# Patient Record
Sex: MALE | Race: WHITE | NOT HISPANIC OR LATINO
[De-identification: names, ages, dates, MRNs, and addresses within clinical notes are randomized per-mention and may not be internally consistent; named-entity substitution may affect disease eponyms.]

---

## 2017-02-15 ENCOUNTER — RX RENEWAL (OUTPATIENT)
Age: 77
End: 2017-02-15

## 2017-03-08 ENCOUNTER — RESULT REVIEW (OUTPATIENT)
Age: 77
End: 2017-03-08

## 2017-03-09 ENCOUNTER — TRANSCRIPTION ENCOUNTER (OUTPATIENT)
Age: 77
End: 2017-03-09

## 2017-03-09 ENCOUNTER — OUTPATIENT (OUTPATIENT)
Dept: OUTPATIENT SERVICES | Facility: HOSPITAL | Age: 77
LOS: 1 days | Discharge: HOME | End: 2017-03-09
Payer: MEDICARE

## 2017-03-09 DIAGNOSIS — E11.9 TYPE 2 DIABETES MELLITUS WITHOUT COMPLICATIONS: ICD-10-CM

## 2017-03-09 DIAGNOSIS — I10 ESSENTIAL (PRIMARY) HYPERTENSION: ICD-10-CM

## 2017-03-09 DIAGNOSIS — K92.1 MELENA: ICD-10-CM

## 2017-03-09 DIAGNOSIS — B96.81 HELICOBACTER PYLORI [H. PYLORI] AS THE CAUSE OF DISEASES CLASSIFIED ELSEWHERE: ICD-10-CM

## 2017-03-09 DIAGNOSIS — K29.50 UNSPECIFIED CHRONIC GASTRITIS WITHOUT BLEEDING: ICD-10-CM

## 2017-03-09 DIAGNOSIS — R19.5 OTHER FECAL ABNORMALITIES: ICD-10-CM

## 2017-03-09 DIAGNOSIS — Z88.0 ALLERGY STATUS TO PENICILLIN: ICD-10-CM

## 2017-03-09 DIAGNOSIS — I25.2 OLD MYOCARDIAL INFARCTION: ICD-10-CM

## 2017-03-09 PROCEDURE — 88305 TISSUE EXAM BY PATHOLOGIST: CPT | Mod: 26

## 2017-03-09 PROCEDURE — 88312 SPECIAL STAINS GROUP 1: CPT | Mod: 26

## 2017-03-10 ENCOUNTER — APPOINTMENT (OUTPATIENT)
Dept: PHYSICAL MEDICINE AND REHAB | Facility: CLINIC | Age: 77
End: 2017-03-10

## 2017-03-10 PROCEDURE — 88305 TISSUE EXAM BY PATHOLOGIST: CPT

## 2017-03-10 PROCEDURE — 43239 EGD BIOPSY SINGLE/MULTIPLE: CPT

## 2017-03-10 PROCEDURE — 88312 SPECIAL STAINS GROUP 1: CPT

## 2017-06-01 ENCOUNTER — APPOINTMENT (OUTPATIENT)
Dept: INTERNAL MEDICINE | Facility: CLINIC | Age: 77
End: 2017-06-01

## 2017-06-01 VITALS — SYSTOLIC BLOOD PRESSURE: 120 MMHG | RESPIRATION RATE: 16 BRPM | DIASTOLIC BLOOD PRESSURE: 70 MMHG | HEART RATE: 76 BPM

## 2017-06-01 DIAGNOSIS — E88.81 METABOLIC SYNDROME: ICD-10-CM

## 2017-06-01 DIAGNOSIS — J30.2 OTHER SEASONAL ALLERGIC RHINITIS: ICD-10-CM

## 2017-06-01 RX ORDER — LEVOTHYROXINE SODIUM 0.03 MG/1
25 TABLET ORAL DAILY
Qty: 90 | Refills: 3 | Status: DISCONTINUED | COMMUNITY
Start: 2017-05-16 | End: 2017-06-01

## 2017-06-05 ENCOUNTER — EMERGENCY (EMERGENCY)
Facility: HOSPITAL | Age: 77
LOS: 1 days | Discharge: HOME | End: 2017-06-05
Admitting: EMERGENCY MEDICINE
Payer: MEDICARE

## 2017-06-05 DIAGNOSIS — M62.838 OTHER MUSCLE SPASM: ICD-10-CM

## 2017-06-05 DIAGNOSIS — Z88.1 ALLERGY STATUS TO OTHER ANTIBIOTIC AGENTS STATUS: ICD-10-CM

## 2017-06-05 DIAGNOSIS — E11.9 TYPE 2 DIABETES MELLITUS WITHOUT COMPLICATIONS: ICD-10-CM

## 2017-06-05 DIAGNOSIS — Z79.899 OTHER LONG TERM (CURRENT) DRUG THERAPY: ICD-10-CM

## 2017-06-05 DIAGNOSIS — Z88.0 ALLERGY STATUS TO PENICILLIN: ICD-10-CM

## 2017-06-05 DIAGNOSIS — Z88.2 ALLERGY STATUS TO SULFONAMIDES: ICD-10-CM

## 2017-06-05 PROCEDURE — 87086 URINE CULTURE/COLONY COUNT: CPT

## 2017-06-05 PROCEDURE — 36415 COLL VENOUS BLD VENIPUNCTURE: CPT

## 2017-06-05 PROCEDURE — 80048 BASIC METABOLIC PNL TOTAL CA: CPT

## 2017-06-05 PROCEDURE — 99284 EMERGENCY DEPT VISIT MOD MDM: CPT

## 2017-06-05 PROCEDURE — 99283 EMERGENCY DEPT VISIT LOW MDM: CPT | Mod: 25

## 2017-06-05 PROCEDURE — 85027 COMPLETE CBC AUTOMATED: CPT

## 2017-06-05 PROCEDURE — 81003 URINALYSIS AUTO W/O SCOPE: CPT

## 2017-06-05 PROCEDURE — 51702 INSERT TEMP BLADDER CATH: CPT

## 2017-06-05 PROCEDURE — 73522 X-RAY EXAM HIPS BI 3-4 VIEWS: CPT | Mod: 26

## 2017-06-05 PROCEDURE — 73522 X-RAY EXAM HIPS BI 3-4 VIEWS: CPT

## 2017-06-08 LAB
ALBUMIN SERPL ELPH-MCNC: 3.7 G/DL
ALP BLD-CCNC: 87 U/L
ALT SERPL-CCNC: 30 U/L
ANION GAP SERPL CALC-SCNC: 14 MMOL/L
AST SERPL-CCNC: 32 U/L
BASOPHILS # BLD AUTO: 0.02 K/UL
BASOPHILS NFR BLD AUTO: 0.2 %
BILIRUB SERPL-MCNC: 0.3 MG/DL
BUN SERPL-MCNC: 16 MG/DL
CALCIUM SERPL-MCNC: 9.8 MG/DL
CHLORIDE SERPL-SCNC: 103 MMOL/L
CHOLEST SERPL-MCNC: 138 MG/DL
CHOLEST/HDLC SERPL: 2 RATIO
CO2 SERPL-SCNC: 23 MMOL/L
CREAT SERPL-MCNC: 0.67 MG/DL
EOSINOPHIL # BLD AUTO: 0.38 K/UL
EOSINOPHIL NFR BLD AUTO: 3.5 %
FERRITIN SERPL-MCNC: 204 NG/ML
FOLATE SERPL-MCNC: 17 NG/ML
GLUCOSE SERPL-MCNC: 113 MG/DL
HBA1C MFR BLD HPLC: 6 %
HCT VFR BLD CALC: 37.4 %
HDLC SERPL-MCNC: 69 MG/DL
HGB BLD-MCNC: 12.1 G/DL
IMM GRANULOCYTES NFR BLD AUTO: 0.3 %
IRON SATN MFR SERPL: 29 %
IRON SERPL-MCNC: 61 UG/DL
LDLC SERPL CALC-MCNC: 59 MG/DL
LYMPHOCYTES # BLD AUTO: 0.53 K/UL
LYMPHOCYTES NFR BLD AUTO: 4.9 %
MAN DIFF?: NORMAL
MCHC RBC-ENTMCNC: 29.6 PG
MCHC RBC-ENTMCNC: 32.4 GM/DL
MCV RBC AUTO: 91.4 FL
MONOCYTES # BLD AUTO: 0.85 K/UL
MONOCYTES NFR BLD AUTO: 7.9 %
NEUTROPHILS # BLD AUTO: 8.97 K/UL
NEUTROPHILS NFR BLD AUTO: 83.2 %
PLATELET # BLD AUTO: 321 K/UL
POTASSIUM SERPL-SCNC: 4.7 MMOL/L
PROT SERPL-MCNC: 6.9 G/DL
RBC # BLD: 4.09 M/UL
RBC # FLD: 14.2 %
SODIUM SERPL-SCNC: 140 MMOL/L
T4 FREE SERPL-MCNC: 1.2 NG/DL
T4 SERPL-MCNC: 5.9 UG/DL
TIBC SERPL-MCNC: 213 UG/DL
TRIGL SERPL-MCNC: 48 MG/DL
TSH SERPL-ACNC: 3.24 UIU/ML
UIBC SERPL-MCNC: 152 UG/DL
VIT B12 SERPL-MCNC: 1698 PG/ML
WBC # FLD AUTO: 10.78 K/UL

## 2017-06-09 ENCOUNTER — MEDICATION RENEWAL (OUTPATIENT)
Age: 77
End: 2017-06-09

## 2017-06-14 ENCOUNTER — APPOINTMENT (OUTPATIENT)
Dept: PHYSICAL MEDICINE AND REHAB | Facility: CLINIC | Age: 77
End: 2017-06-14

## 2017-06-14 VITALS
DIASTOLIC BLOOD PRESSURE: 61 MMHG | HEIGHT: 72 IN | HEART RATE: 64 BPM | BODY MASS INDEX: 18.42 KG/M2 | OXYGEN SATURATION: 100 % | SYSTOLIC BLOOD PRESSURE: 99 MMHG | TEMPERATURE: 98.5 F | WEIGHT: 136 LBS

## 2017-08-18 ENCOUNTER — APPOINTMENT (OUTPATIENT)
Dept: INTERNAL MEDICINE | Facility: CLINIC | Age: 77
End: 2017-08-18
Payer: MEDICARE

## 2017-08-18 VITALS
HEIGHT: 72 IN | HEART RATE: 64 BPM | DIASTOLIC BLOOD PRESSURE: 62 MMHG | OXYGEN SATURATION: 98 % | RESPIRATION RATE: 16 BRPM | WEIGHT: 136 LBS | TEMPERATURE: 98.2 F | SYSTOLIC BLOOD PRESSURE: 118 MMHG | BODY MASS INDEX: 18.42 KG/M2

## 2017-08-18 DIAGNOSIS — Z83.3 FAMILY HISTORY OF DIABETES MELLITUS: ICD-10-CM

## 2017-08-18 DIAGNOSIS — Z83.1 FAMILY HISTORY OF OTHER INFECTIOUS AND PARASITIC DISEASES: ICD-10-CM

## 2017-08-18 DIAGNOSIS — Z78.9 OTHER SPECIFIED HEALTH STATUS: ICD-10-CM

## 2017-08-18 PROCEDURE — 99214 OFFICE O/P EST MOD 30 MIN: CPT

## 2017-08-18 RX ORDER — CLOTRIMAZOLE AND BETAMETHASONE DIPROPIONATE 10; .5 MG/G; MG/G
1-0.05 CREAM TOPICAL
Qty: 45 | Refills: 0 | Status: DISCONTINUED | COMMUNITY
Start: 2017-02-07 | End: 2017-08-18

## 2017-08-18 RX ORDER — LEVOTHYROXINE SODIUM 0.05 MG/1
50 TABLET ORAL
Qty: 90 | Refills: 0 | Status: DISCONTINUED | COMMUNITY
Start: 2017-04-12

## 2017-08-18 RX ORDER — LEVOFLOXACIN 250 MG/1
250 TABLET, FILM COATED ORAL
Qty: 7 | Refills: 0 | Status: DISCONTINUED | COMMUNITY
Start: 2017-08-10

## 2017-08-18 RX ORDER — FLUTICASONE PROPIONATE 50 UG/1
50 SPRAY, METERED NASAL TWICE DAILY
Qty: 3 | Refills: 3 | Status: DISCONTINUED | COMMUNITY
Start: 2017-06-09 | End: 2017-08-18

## 2017-08-18 RX ORDER — MUPIROCIN 20 MG/G
2 OINTMENT TOPICAL
Qty: 22 | Refills: 0 | Status: DISCONTINUED | COMMUNITY
Start: 2017-02-23

## 2017-08-18 RX ORDER — MUPIROCIN 2 G/100G
2 CREAM TOPICAL
Qty: 30 | Refills: 0 | Status: DISCONTINUED | COMMUNITY
Start: 2017-03-31 | End: 2017-08-18

## 2017-10-12 ENCOUNTER — APPOINTMENT (OUTPATIENT)
Dept: INTERNAL MEDICINE | Facility: CLINIC | Age: 77
End: 2017-10-12
Payer: MEDICARE

## 2017-10-12 DIAGNOSIS — Z23 ENCOUNTER FOR IMMUNIZATION: ICD-10-CM

## 2017-10-12 PROCEDURE — 90686 IIV4 VACC NO PRSV 0.5 ML IM: CPT

## 2017-10-12 PROCEDURE — G0009: CPT

## 2017-10-12 PROCEDURE — 90670 PCV13 VACCINE IM: CPT

## 2017-10-12 PROCEDURE — G0008: CPT

## 2017-10-26 LAB
25(OH)D3 SERPL-MCNC: 47.5 NG/ML
ALBUMIN SERPL ELPH-MCNC: 3.9 G/DL
ALP BLD-CCNC: 77 U/L
ALT SERPL-CCNC: 16 U/L
ANION GAP SERPL CALC-SCNC: 12 MMOL/L
AST SERPL-CCNC: 35 U/L
BASOPHILS # BLD AUTO: 0.01 K/UL
BASOPHILS NFR BLD AUTO: 0.2 %
BILIRUB SERPL-MCNC: 0.4 MG/DL
BUN SERPL-MCNC: 18 MG/DL
CALCIUM SERPL-MCNC: 9.3 MG/DL
CHLORIDE SERPL-SCNC: 100 MMOL/L
CHOLEST SERPL-MCNC: 157 MG/DL
CHOLEST/HDLC SERPL: 2 RATIO
CO2 SERPL-SCNC: 27 MMOL/L
CREAT SERPL-MCNC: 0.55 MG/DL
EOSINOPHIL # BLD AUTO: 0.26 K/UL
EOSINOPHIL NFR BLD AUTO: 3.9 %
GLUCOSE SERPL-MCNC: 109 MG/DL
HBA1C MFR BLD HPLC: 5.9 %
HCT VFR BLD CALC: 38.1 %
HDLC SERPL-MCNC: 78 MG/DL
HGB BLD-MCNC: 12.6 G/DL
IMM GRANULOCYTES NFR BLD AUTO: 0.3 %
LDLC SERPL CALC-MCNC: 70 MG/DL
LYMPHOCYTES # BLD AUTO: 0.64 K/UL
LYMPHOCYTES NFR BLD AUTO: 9.6 %
MAN DIFF?: NORMAL
MCHC RBC-ENTMCNC: 30.7 PG
MCHC RBC-ENTMCNC: 33.1 GM/DL
MCV RBC AUTO: 92.9 FL
MONOCYTES # BLD AUTO: 0.59 K/UL
MONOCYTES NFR BLD AUTO: 8.9 %
NEUTROPHILS # BLD AUTO: 5.13 K/UL
NEUTROPHILS NFR BLD AUTO: 77.1 %
PLATELET # BLD AUTO: 239 K/UL
POTASSIUM SERPL-SCNC: 5 MMOL/L
PROT SERPL-MCNC: 7.3 G/DL
RBC # BLD: 4.1 M/UL
RBC # FLD: 14.5 %
SODIUM SERPL-SCNC: 139 MMOL/L
TRIGL SERPL-MCNC: 44 MG/DL
TSH SERPL-ACNC: 2.77 UIU/ML
WBC # FLD AUTO: 6.65 K/UL

## 2017-11-08 LAB
PSA FREE FLD-MCNC: 26
PSA FREE SERPL-MCNC: 0.47 NG/ML
PSA SERPL-MCNC: 1.81 NG/ML

## 2017-11-27 ENCOUNTER — OTHER (OUTPATIENT)
Age: 77
End: 2017-11-27

## 2017-11-27 ENCOUNTER — MEDICATION RENEWAL (OUTPATIENT)
Age: 77
End: 2017-11-27

## 2017-11-27 DIAGNOSIS — E55.9 VITAMIN D DEFICIENCY, UNSPECIFIED: ICD-10-CM

## 2017-11-28 LAB — 25(OH)D3 SERPL-MCNC: 56.1 NG/ML

## 2018-01-26 ENCOUNTER — EMERGENCY (EMERGENCY)
Facility: HOSPITAL | Age: 78
LOS: 1 days | Discharge: HOME | End: 2018-01-26
Admitting: EMERGENCY MEDICINE
Payer: MEDICARE

## 2018-01-26 DIAGNOSIS — Z88.0 ALLERGY STATUS TO PENICILLIN: ICD-10-CM

## 2018-01-26 DIAGNOSIS — R05 COUGH: ICD-10-CM

## 2018-01-26 DIAGNOSIS — Z86.69 PERSONAL HISTORY OF OTHER DISEASES OF THE NERVOUS SYSTEM AND SENSE ORGANS: ICD-10-CM

## 2018-01-26 DIAGNOSIS — L89.114 PRESSURE ULCER OF RIGHT UPPER BACK, STAGE 4: ICD-10-CM

## 2018-01-26 DIAGNOSIS — Z88.2 ALLERGY STATUS TO SULFONAMIDES: ICD-10-CM

## 2018-01-26 DIAGNOSIS — Z88.1 ALLERGY STATUS TO OTHER ANTIBIOTIC AGENTS STATUS: ICD-10-CM

## 2018-01-26 DIAGNOSIS — L08.9 LOCAL INFECTION OF THE SKIN AND SUBCUTANEOUS TISSUE, UNSPECIFIED: ICD-10-CM

## 2018-01-26 DIAGNOSIS — Z79.899 OTHER LONG TERM (CURRENT) DRUG THERAPY: ICD-10-CM

## 2018-01-26 DIAGNOSIS — E11.9 TYPE 2 DIABETES MELLITUS WITHOUT COMPLICATIONS: ICD-10-CM

## 2018-01-26 DIAGNOSIS — Z86.19 PERSONAL HISTORY OF OTHER INFECTIOUS AND PARASITIC DISEASES: ICD-10-CM

## 2018-01-26 DIAGNOSIS — I10 ESSENTIAL (PRIMARY) HYPERTENSION: ICD-10-CM

## 2018-01-26 PROCEDURE — 71045 X-RAY EXAM CHEST 1 VIEW: CPT

## 2018-01-26 PROCEDURE — 83605 ASSAY OF LACTIC ACID: CPT

## 2018-01-26 PROCEDURE — 99284 EMERGENCY DEPT VISIT MOD MDM: CPT

## 2018-01-26 PROCEDURE — 96365 THER/PROPH/DIAG IV INF INIT: CPT

## 2018-01-26 PROCEDURE — 85610 PROTHROMBIN TIME: CPT

## 2018-01-26 PROCEDURE — 93005 ELECTROCARDIOGRAM TRACING: CPT

## 2018-01-26 PROCEDURE — 87633 RESP VIRUS 12-25 TARGETS: CPT

## 2018-01-26 PROCEDURE — 85027 COMPLETE CBC AUTOMATED: CPT

## 2018-01-26 PROCEDURE — 96368 THER/DIAG CONCURRENT INF: CPT

## 2018-01-26 PROCEDURE — 87070 CULTURE OTHR SPECIMN AEROBIC: CPT

## 2018-01-26 PROCEDURE — 87400 INFLUENZA A/B EACH AG IA: CPT

## 2018-01-26 PROCEDURE — 71045 X-RAY EXAM CHEST 1 VIEW: CPT | Mod: 26

## 2018-01-26 PROCEDURE — 87040 BLOOD CULTURE FOR BACTERIA: CPT

## 2018-01-26 PROCEDURE — 87581 M.PNEUMON DNA AMP PROBE: CPT

## 2018-01-26 PROCEDURE — 80048 BASIC METABOLIC PNL TOTAL CA: CPT

## 2018-01-26 PROCEDURE — 87486 CHLMYD PNEUM DNA AMP PROBE: CPT

## 2018-01-26 PROCEDURE — 99285 EMERGENCY DEPT VISIT HI MDM: CPT | Mod: 25

## 2018-01-26 PROCEDURE — 85730 THROMBOPLASTIN TIME PARTIAL: CPT

## 2018-01-26 PROCEDURE — 93010 ELECTROCARDIOGRAM REPORT: CPT

## 2018-01-29 ENCOUNTER — APPOINTMENT (OUTPATIENT)
Dept: INTERNAL MEDICINE | Facility: CLINIC | Age: 78
End: 2018-01-29
Payer: MEDICARE

## 2018-01-29 VITALS
HEIGHT: 72 IN | TEMPERATURE: 97.9 F | OXYGEN SATURATION: 100 % | SYSTOLIC BLOOD PRESSURE: 96 MMHG | HEART RATE: 74 BPM | DIASTOLIC BLOOD PRESSURE: 50 MMHG | RESPIRATION RATE: 18 BRPM

## 2018-01-29 DIAGNOSIS — M46.24 OSTEOMYELITIS OF VERTEBRA, THORACIC REGION: ICD-10-CM

## 2018-01-29 PROCEDURE — 99214 OFFICE O/P EST MOD 30 MIN: CPT

## 2018-01-29 RX ORDER — MUPIROCIN CALCIUM 20 MG/G
2 CREAM TOPICAL TWICE DAILY
Refills: 0 | Status: DISCONTINUED | COMMUNITY
Start: 2017-08-18 | End: 2018-01-29

## 2018-01-29 RX ORDER — HYDROCORTISONE VALERATE 2 MG/G
0.2 CREAM TOPICAL
Qty: 60 | Refills: 0 | Status: DISCONTINUED | COMMUNITY
Start: 2017-10-26

## 2018-02-01 ENCOUNTER — APPOINTMENT (OUTPATIENT)
Dept: RADIOLOGY | Facility: CLINIC | Age: 78
End: 2018-02-01

## 2018-02-07 ENCOUNTER — APPOINTMENT (OUTPATIENT)
Dept: INTERNAL MEDICINE | Facility: CLINIC | Age: 78
End: 2018-02-07
Payer: MEDICARE

## 2018-02-07 VITALS
BODY MASS INDEX: 18.42 KG/M2 | DIASTOLIC BLOOD PRESSURE: 66 MMHG | HEIGHT: 72 IN | WEIGHT: 136 LBS | SYSTOLIC BLOOD PRESSURE: 98 MMHG

## 2018-02-07 PROCEDURE — 99214 OFFICE O/P EST MOD 30 MIN: CPT

## 2018-02-22 ENCOUNTER — MEDICATION RENEWAL (OUTPATIENT)
Age: 78
End: 2018-02-22

## 2018-02-22 DIAGNOSIS — K59.00 CONSTIPATION, UNSPECIFIED: ICD-10-CM

## 2018-03-27 ENCOUNTER — MEDICATION RENEWAL (OUTPATIENT)
Age: 78
End: 2018-03-27

## 2018-03-27 DIAGNOSIS — R19.7 DIARRHEA, UNSPECIFIED: ICD-10-CM

## 2018-04-02 PROBLEM — R19.7 DIARRHEA: Status: ACTIVE | Noted: 2018-04-02

## 2018-04-24 DIAGNOSIS — I25.10 ATHEROSCLEROTIC HEART DISEASE OF NATIVE CORONARY ARTERY W/OUT ANGINA PECTORIS: ICD-10-CM

## 2018-04-24 DIAGNOSIS — Z01.818 ENCOUNTER FOR OTHER PREPROCEDURAL EXAMINATION: ICD-10-CM

## 2018-05-02 VITALS
SYSTOLIC BLOOD PRESSURE: 100 MMHG | HEIGHT: 72 IN | TEMPERATURE: 98 F | HEART RATE: 76 BPM | RESPIRATION RATE: 16 BRPM | DIASTOLIC BLOOD PRESSURE: 60 MMHG

## 2018-05-02 PROBLEM — Z01.818 PREOPERATIVE EXAMINATION: Status: ACTIVE | Noted: 2018-02-07

## 2018-06-11 ENCOUNTER — APPOINTMENT (OUTPATIENT)
Dept: PHYSICAL MEDICINE AND REHAB | Facility: CLINIC | Age: 78
End: 2018-06-11
Payer: MEDICARE

## 2018-06-11 PROCEDURE — G0372 MD SERVICE REQUIRED FOR PMD: CPT

## 2018-06-11 PROCEDURE — 99213 OFFICE O/P EST LOW 20 MIN: CPT

## 2018-06-12 VITALS
TEMPERATURE: 97.4 F | OXYGEN SATURATION: 99 % | HEART RATE: 71 BPM | DIASTOLIC BLOOD PRESSURE: 69 MMHG | SYSTOLIC BLOOD PRESSURE: 106 MMHG

## 2018-06-12 RX ORDER — LACTULOSE 10 G/15ML
10 SOLUTION ORAL
Qty: 2 | Refills: 5 | Status: ACTIVE | COMMUNITY
Start: 2018-02-22

## 2018-06-12 RX ORDER — CHOLESTYRAMINE 4 G/9G
4 POWDER, FOR SUSPENSION ORAL DAILY
Qty: 30 | Refills: 5 | Status: DISCONTINUED | COMMUNITY
Start: 2018-04-02 | End: 2018-06-12

## 2018-06-25 ENCOUNTER — RX RENEWAL (OUTPATIENT)
Age: 78
End: 2018-06-25

## 2018-07-23 PROBLEM — E88.81 INSULIN RESISTANCE: Status: ACTIVE | Noted: 2017-06-01

## 2018-08-02 ENCOUNTER — APPOINTMENT (OUTPATIENT)
Dept: OTOLARYNGOLOGY | Facility: CLINIC | Age: 78
End: 2018-08-02

## 2018-10-04 ENCOUNTER — CHART COPY (OUTPATIENT)
Age: 78
End: 2018-10-04

## 2018-10-09 ENCOUNTER — APPOINTMENT (OUTPATIENT)
Dept: INTERNAL MEDICINE | Facility: CLINIC | Age: 78
End: 2018-10-09
Payer: MEDICARE

## 2018-10-09 VITALS
HEART RATE: 65 BPM | HEIGHT: 72 IN | OXYGEN SATURATION: 98 % | DIASTOLIC BLOOD PRESSURE: 60 MMHG | SYSTOLIC BLOOD PRESSURE: 90 MMHG

## 2018-10-09 DIAGNOSIS — Z23 ENCOUNTER FOR IMMUNIZATION: ICD-10-CM

## 2018-10-09 PROCEDURE — 99214 OFFICE O/P EST MOD 30 MIN: CPT | Mod: 25

## 2018-10-09 PROCEDURE — 90662 IIV NO PRSV INCREASED AG IM: CPT

## 2018-10-09 PROCEDURE — G0008: CPT

## 2018-10-09 NOTE — HISTORY OF PRESENT ILLNESS
[FreeTextEntry1] : Follow up wounds [de-identified] : Here for follow up \par -wounds healing slowly\par -appetite OK\par -taking supplements including 100 mg of protein, whey protein 40 grams, eggs ham

## 2018-10-18 LAB
25(OH)D3 SERPL-MCNC: 117 NG/ML
ALBUMIN SERPL ELPH-MCNC: 3.7 G/DL
ALP BLD-CCNC: 100 U/L
ALT SERPL-CCNC: 22 U/L
ANION GAP SERPL CALC-SCNC: 10 MMOL/L
AST SERPL-CCNC: 19 U/L
BASOPHILS # BLD AUTO: 0.01 K/UL
BASOPHILS NFR BLD AUTO: 0.1 %
BILIRUB SERPL-MCNC: 0.3 MG/DL
BUN SERPL-MCNC: 23 MG/DL
CALCIUM SERPL-MCNC: 9.1 MG/DL
CHLORIDE SERPL-SCNC: 104 MMOL/L
CHOLEST SERPL-MCNC: 126 MG/DL
CHOLEST/HDLC SERPL: 2.1 RATIO
CO2 SERPL-SCNC: 26 MMOL/L
CREAT SERPL-MCNC: 0.47 MG/DL
EOSINOPHIL # BLD AUTO: 0.28 K/UL
EOSINOPHIL NFR BLD AUTO: 4.1 %
GLUCOSE SERPL-MCNC: 130 MG/DL
HBA1C MFR BLD HPLC: 6.2 %
HCT VFR BLD CALC: 34 %
HDLC SERPL-MCNC: 60 MG/DL
HGB BLD-MCNC: 11.3 G/DL
IMM GRANULOCYTES NFR BLD AUTO: 0.1 %
LDLC SERPL CALC-MCNC: 58 MG/DL
LYMPHOCYTES # BLD AUTO: 0.79 K/UL
LYMPHOCYTES NFR BLD AUTO: 11.4 %
MAN DIFF?: NORMAL
MCHC RBC-ENTMCNC: 29.2 PG
MCHC RBC-ENTMCNC: 33.2 GM/DL
MCV RBC AUTO: 87.9 FL
MONOCYTES # BLD AUTO: 0.61 K/UL
MONOCYTES NFR BLD AUTO: 8.8 %
NEUTROPHILS # BLD AUTO: 5.2 K/UL
NEUTROPHILS NFR BLD AUTO: 75.5 %
PLATELET # BLD AUTO: 275 K/UL
POTASSIUM SERPL-SCNC: 5.4 MMOL/L
PROT SERPL-MCNC: 6.9 G/DL
RBC # BLD: 3.87 M/UL
RBC # FLD: 15.5 %
SODIUM SERPL-SCNC: 141 MMOL/L
TRIGL SERPL-MCNC: 38 MG/DL
TSH SERPL-ACNC: 3.54 UIU/ML
WBC # FLD AUTO: 6.9 K/UL

## 2018-11-01 ENCOUNTER — EMERGENCY (EMERGENCY)
Facility: HOSPITAL | Age: 78
LOS: 1 days | Discharge: HOME | End: 2018-11-01
Attending: EMERGENCY MEDICINE | Admitting: EMERGENCY MEDICINE
Payer: MEDICARE

## 2018-11-01 VITALS
HEIGHT: 72 IN | WEIGHT: 139.99 LBS | DIASTOLIC BLOOD PRESSURE: 82 MMHG | SYSTOLIC BLOOD PRESSURE: 136 MMHG | HEART RATE: 61 BPM | RESPIRATION RATE: 18 BRPM | TEMPERATURE: 98 F | OXYGEN SATURATION: 98 %

## 2018-11-01 DIAGNOSIS — R89.9 UNSPECIFIED ABNORMAL FINDING IN SPECIMENS FROM OTHER ORGANS, SYSTEMS AND TISSUES: ICD-10-CM

## 2018-11-01 PROCEDURE — 99283 EMERGENCY DEPT VISIT LOW MDM: CPT

## 2018-11-01 NOTE — ED PROVIDER NOTE - MEDICAL DECISION MAKING DETAILS
77 y/o M with h/o DM, paraplegia with chronic left foot ulceration sent in for "antibiotic script"- touch base with wound center to clarify reason for ED visit. Physical exam does not warrant IV ABX- consider PO ABX pending conversation. Will touch base with PMD Reyes as well.

## 2018-11-01 NOTE — ED PROVIDER NOTE - CARE PLAN
Assessment and plan of treatment:	Follow up with your PMD within 48-72 hrs.  Take all of your medications as previously prescribed. We will call you today. Worsening, continued or ANY new concerning symptoms return to the emergency department. Principal Discharge DX:	Skin ulcer of left foot, limited to breakdown of skin  Assessment and plan of treatment:	Follow up with your PMD within 48-72 hrs for a wound check.  Take all of your medications as previously prescribed. We will call you today for further medical management recommendations once we speak to the wound center. Worsening, continued or ANY new concerning symptoms return to the emergency department.

## 2018-11-01 NOTE — ED PROVIDER NOTE - ATTENDING CONTRIBUTION TO CARE
I personally evaluated the patient. I reviewed the Resident’s or Physician Assistant’s note (as assigned above), and agree with the findings and plan except as documented in my note.  Patient undergoing wound care at [facility]. WBC reported to be 13 and nurse sent patient for rx for antibiotics. Office called multiple times for more info . Did not call back and patient insisted on leaving.  PE: 2.5 cm circular ulcer with packing in place  5th metatarsal . No pus /streaking/swelling  Office called after patient left- ? admit based on white count  PO antibiotics given . Discussed with dr reyes who will follow up

## 2018-11-01 NOTE — ED PROVIDER NOTE - PLAN OF CARE
Follow up with your PMD within 48-72 hrs.  Take all of your medications as previously prescribed. We will call you today. Worsening, continued or ANY new concerning symptoms return to the emergency department. Follow up with your PMD within 48-72 hrs for a wound check.  Take all of your medications as previously prescribed. We will call you today for further medical management recommendations once we speak to the wound center. Worsening, continued or ANY new concerning symptoms return to the emergency department.

## 2018-11-01 NOTE — ED ADULT NURSE NOTE - NSIMPLEMENTINTERV_GEN_ALL_ED
Implemented All Fall with Harm Risk Interventions:  [place] to call system. Call bell, personal items and telephone within reach. Instruct patient to call for assistance. Room bathroom lighting operational. Non-slip footwear when patient is off stretcher. Physically safe environment: no spills, clutter or unnecessary equipment. Stretcher in lowest position, wheels locked, appropriate side rails in place. Provide visual cue, wrist band, yellow gown, etc. Monitor gait and stability. Monitor for mental status changes and reorient to person, place, and time. Review medications for side effects contributing to fall risk. Reinforce activity limits and safety measures with patient and family. Provide visual clues: red socks.

## 2018-11-01 NOTE — ED PROVIDER NOTE - OBJECTIVE STATEMENT
77 y/o M followed by [facility] wound center Dr. Clifton received a call from nurse today stating as per pt "go to the ER for a rx for oral antibiotic because your WBC count in 13". States Dr. Clifton is not in the office. Refusing to answer questions.   PMD: Reyes 77 y/o M with h/o DM, paraplegia followed by [facility] wound center Dr. Clifton received a call from nurse today stating as per pt "go to the ER for a rx for oral antibiotic because your WBC count in 13". States Dr. Clifton is not in the office. Refusing to answer questions or undress the wound . States he had an xray foot done yesterday- was not told the results. "just give me the abx so I can get out.   PMD: Reyes 77 y/o M with h/o DM, paraplegia followed by [facility] wound center Dr. Clifton received a call from nurse today stating as per pt "go to the ER for a rx for oral antibiotic because your WBC count in 13". States Dr. Clifton is not in the office. Refusing to answer questions or undress the wound . Denies fever, chills, increased pain. States he had an xray foot done yesterday- was not told the results. "just give me the abx so I can get out.   PMD: Reyes 77 y/o M with h/o DM, paraplegia, with chronic left foot ulceration followed by [facility] wound center Dr. Clifton received a call from nurse today stating as per pt "go to the ER for a rx for oral antibiotic because your WBC count in 13". States Dr. Clifton is not in the office. Refusing to undress the wound. Denies fever, chills, increased pain. States he had an xray foot done yesterday- was not told the results. "just give me the abx so I can get out".   PMD: Reyes

## 2018-11-01 NOTE — ED ADULT NURSE NOTE - OBJECTIVE STATEMENT
Pt stated he was told to come by his wound care MD from [facility] because he needs oral antibiotic for his WBC

## 2018-11-01 NOTE — ED PROVIDER NOTE - PROGRESS NOTE DETAILS
Call put into [facility] wound care awaiting call back. Pt requestign to leave. Another call put into wound center. Will DC pt and send in ABX once we contact center for xray results, lab results, and speak to the nurse. We will call pt [phone] Pt requesting to leave. Another call put into wound center. Will DC pt and send in ABX once we contact center for xray results, lab results, and speak to the nurse. We will call pt [phone]. Call put into Reyes and case dsicussed. Another call put into wound care center Spoke with wound center. Wanted pt sent in for IV abx due to elevated WBC of 13 as per the nurse's decision- doctor was on vacation. Xray results revealed no evidence of osteo. Will send in PO ABX Doxy 100mg 1 tab 2x/day for 7 days to [facility] . Discussed with Dr. Reyes who will see him in his office this week for follow up. I spoke to pt and pt wife who agrees with the plan. Spoke with wound center. Wanted pt sent in for IV abx due to "elevated WBC of 13" as per the nurse's decision (pt was last examined 5 days ago)- doctor then went on vacation. Nurse had xray results in chart- no evidence of osteo. Will send in PO ABX Doxy 100mg 1 tab 2x/day for 7 days to [facility] . Discussed with Dr. Reyes who will see him in his office this week for follow up. I spoke to pt and pt wife who agrees with the plan. Strict return precautions discussed such as increased redness, streaking red lines, drainage, fever, chills or ANY NEW concerning symptoms.

## 2018-11-05 ENCOUNTER — APPOINTMENT (OUTPATIENT)
Dept: INTERNAL MEDICINE | Facility: CLINIC | Age: 78
End: 2018-11-05
Payer: MEDICARE

## 2018-11-05 VITALS
DIASTOLIC BLOOD PRESSURE: 60 MMHG | SYSTOLIC BLOOD PRESSURE: 85 MMHG | HEART RATE: 126 BPM | TEMPERATURE: 97.5 F | HEIGHT: 72 IN | OXYGEN SATURATION: 86 %

## 2018-11-05 DIAGNOSIS — D64.9 ANEMIA, UNSPECIFIED: ICD-10-CM

## 2018-11-05 LAB
BASOPHILS # BLD AUTO: 0.02 K/UL
BASOPHILS NFR BLD AUTO: 0.2 %
EOSINOPHIL # BLD AUTO: 0.28 K/UL
EOSINOPHIL NFR BLD AUTO: 2.5 %
HCT VFR BLD CALC: 32.3 %
HGB BLD-MCNC: 10.9 G/DL
IMM GRANULOCYTES NFR BLD AUTO: 0.5 %
LYMPHOCYTES # BLD AUTO: 0.99 K/UL
LYMPHOCYTES NFR BLD AUTO: 8.9 %
MAN DIFF?: NORMAL
MCHC RBC-ENTMCNC: 29.4 PG
MCHC RBC-ENTMCNC: 33.7 GM/DL
MCV RBC AUTO: 87.1 FL
MONOCYTES # BLD AUTO: 1 K/UL
MONOCYTES NFR BLD AUTO: 9 %
NEUTROPHILS # BLD AUTO: 8.75 K/UL
NEUTROPHILS NFR BLD AUTO: 78.9 %
PLATELET # BLD AUTO: 264 K/UL
RBC # BLD: 3.71 M/UL
RBC # FLD: 15.7 %
WBC # FLD AUTO: 11.09 K/UL

## 2018-11-05 PROCEDURE — 99496 TRANSJ CARE MGMT HIGH F2F 7D: CPT

## 2018-11-05 NOTE — PHYSICAL EXAM
[Chronically Ill] : chronically ill [Hoarse] : was hoarse [Normal Verbal Skills] : the patient had normal verbal communication skills [Normal Sclera/Conjunctiva] : normal sclera/conjunctiva [Normal Outer Ear/Nose] : the outer ears and nose were normal in appearance [No JVD] : no jugular venous distention [No Respiratory Distress] : no respiratory distress  [Normal Rate] : normal rate  [Regular Rhythm] : with a regular rhythm [No Edema] : there was no peripheral edema [Soft] : abdomen soft [Normal Supraclavicular Nodes] : no supraclavicular lymphadenopathy [Normal Axillary Nodes] : no axillary lymphadenopathy [No CVA Tenderness] : no CVA  tenderness [No Spinal Tenderness] : no spinal tenderness [No Joint Swelling] : no joint swelling [Grossly Normal Strength/Tone] : grossly normal strength/tone [No Rash] : no rash [Normal Affect] : the affect was normal [Normal Insight/Judgement] : insight and judgment were intact [de-identified] : ostomy intact [de-identified] : chronic geronimo catheter [de-identified] : wounds are dressed

## 2018-11-05 NOTE — HISTORY OF PRESENT ILLNESS
[Post-hospitalization from ___ Hospital] : Post-hospitalization from [unfilled] Hospital [Discharged on ___] : discharged on [unfilled] [FreeTextEntry2] : foot wound and sacral tunnelling that  [place] doctor said do nothing to.  Homecare RN kept things good; foot was infected with a abcess for 9 months that eventually opened up pus drained, blood as well\par \par Pt went to ED due to high WBC count 13K\par Taking doxycycline with no improvement\par \par Also had new wound on hip\par

## 2018-11-06 LAB
ALBUMIN SERPL ELPH-MCNC: 3.8 G/DL
ALP BLD-CCNC: 87 U/L
ALT SERPL-CCNC: 15 U/L
ANION GAP SERPL CALC-SCNC: 14 MMOL/L
AST SERPL-CCNC: 17 U/L
BILIRUB SERPL-MCNC: 0.2 MG/DL
BUN SERPL-MCNC: 28 MG/DL
CALCIUM SERPL-MCNC: 8.9 MG/DL
CHLORIDE SERPL-SCNC: 103 MMOL/L
CO2 SERPL-SCNC: 23 MMOL/L
CREAT SERPL-MCNC: 0.56 MG/DL
GLUCOSE SERPL-MCNC: 192 MG/DL
POTASSIUM SERPL-SCNC: 5.1 MMOL/L
PROT SERPL-MCNC: 6.3 G/DL
SODIUM SERPL-SCNC: 140 MMOL/L

## 2018-11-15 ENCOUNTER — APPOINTMENT (OUTPATIENT)
Dept: ORTHOPEDIC SURGERY | Facility: CLINIC | Age: 78
End: 2018-11-15
Payer: MEDICARE

## 2018-11-15 VITALS — HEIGHT: 72 IN | WEIGHT: 136 LBS | BODY MASS INDEX: 18.42 KG/M2

## 2018-11-15 DIAGNOSIS — S72.21XD DISPLACED SUBTROCHANTERIC FRACTURE OF RIGHT FEMUR, SUBSEQUENT ENCOUNTER FOR CLOSED FRACTURE WITH ROUTINE HEALING: ICD-10-CM

## 2018-11-15 PROCEDURE — 99214 OFFICE O/P EST MOD 30 MIN: CPT

## 2018-11-15 PROCEDURE — 73552 X-RAY EXAM OF FEMUR 2/>: CPT | Mod: RT

## 2018-12-18 ENCOUNTER — APPOINTMENT (OUTPATIENT)
Dept: INTERNAL MEDICINE | Facility: CLINIC | Age: 78
End: 2018-12-18
Payer: MEDICARE

## 2018-12-18 VITALS
SYSTOLIC BLOOD PRESSURE: 124 MMHG | DIASTOLIC BLOOD PRESSURE: 65 MMHG | HEIGHT: 72 IN | OXYGEN SATURATION: 99 % | BODY MASS INDEX: 18.96 KG/M2 | RESPIRATION RATE: 16 BRPM | HEART RATE: 55 BPM | TEMPERATURE: 97.4 F | WEIGHT: 140 LBS

## 2018-12-18 DIAGNOSIS — G82.50 QUADRIPLEGIA, UNSPECIFIED: ICD-10-CM

## 2018-12-18 DIAGNOSIS — Z88.0 ALLERGY STATUS TO PENICILLIN: ICD-10-CM

## 2018-12-18 DIAGNOSIS — S81.809A UNSPECIFIED OPEN WOUND, UNSPECIFIED LOWER LEG, INITIAL ENCOUNTER: ICD-10-CM

## 2018-12-18 DIAGNOSIS — N31.9 NEUROMUSCULAR DYSFUNCTION OF BLADDER, UNSPECIFIED: ICD-10-CM

## 2018-12-18 PROCEDURE — 99214 OFFICE O/P EST MOD 30 MIN: CPT

## 2018-12-18 NOTE — HISTORY OF PRESENT ILLNESS
[FreeTextEntry1] : Decubiti, paraplegia, hip pain [de-identified] : Has ongoing wound issues, since hospitalization in Oct.\par Hip pain saw Dr Stern, states not recommending surgery\par -cannot use his usual wheelchair due to wounds and cannot self ambulate\par \par Wants to be PCN allergy tested\par \par Went to Ortho as head of right femoral reynaldo causing a bedsore but went to Ortho as above and is on chronic suppressive minocycline\par -per pt right hip wound is healing, \par -due for wound care MD follow up January 2 \par -new wound care SEAN Herrera\par \par Takies a lot of supplements, probiotics and yogurt\par No diarrhea

## 2019-02-11 ENCOUNTER — RX RENEWAL (OUTPATIENT)
Age: 79
End: 2019-02-11

## 2019-04-01 ENCOUNTER — RX RENEWAL (OUTPATIENT)
Age: 79
End: 2019-04-01

## 2019-04-01 DIAGNOSIS — Z00.00 ENCOUNTER FOR GENERAL ADULT MEDICAL EXAMINATION W/OUT ABNORMAL FINDINGS: ICD-10-CM

## 2019-04-02 ENCOUNTER — LABORATORY RESULT (OUTPATIENT)
Age: 79
End: 2019-04-02

## 2019-04-04 ENCOUNTER — LABORATORY RESULT (OUTPATIENT)
Age: 79
End: 2019-04-04

## 2019-06-28 ENCOUNTER — APPOINTMENT (OUTPATIENT)
Dept: INTERNAL MEDICINE | Facility: CLINIC | Age: 79
End: 2019-06-28
Payer: MEDICARE

## 2019-06-28 VITALS
SYSTOLIC BLOOD PRESSURE: 120 MMHG | TEMPERATURE: 98.2 F | BODY MASS INDEX: 18.96 KG/M2 | HEART RATE: 69 BPM | OXYGEN SATURATION: 98 % | WEIGHT: 140 LBS | DIASTOLIC BLOOD PRESSURE: 80 MMHG | RESPIRATION RATE: 17 BRPM | HEIGHT: 72 IN

## 2019-06-28 DIAGNOSIS — T14.8XXA OTHER INJURY OF UNSPECIFIED BODY REGION, INITIAL ENCOUNTER: ICD-10-CM

## 2019-06-28 DIAGNOSIS — E03.9 HYPOTHYROIDISM, UNSPECIFIED: ICD-10-CM

## 2019-06-28 DIAGNOSIS — L08.9 OTHER INJURY OF UNSPECIFIED BODY REGION, INITIAL ENCOUNTER: ICD-10-CM

## 2019-06-28 PROCEDURE — 99214 OFFICE O/P EST MOD 30 MIN: CPT

## 2019-06-28 NOTE — HISTORY OF PRESENT ILLNESS
[FreeTextEntry1] : Folllow up paraplegia, pressure ulcers [de-identified] : Both hips, right thorax and sacrum wounds over one year old ; donor sites not healing only slowly\par >Eating a lot of protein\par \par \par Has new visiting nurse ; \par \par Using special wheelchair tilts back frquently \par \par Old right femur fracture where he has rods has pain; Ortho states that he would need amputation so on a chronic suppressive antibiotic and patient is on probiotics\par \par \par Has rx from Dr Cano at wound care center\par \par

## 2019-06-28 NOTE — REVIEW OF SYSTEMS
[Negative] : Heme/Lymph [Fever] : no fever [FreeTextEntry9] : per HPI; paraplegic and right femur chronic

## 2019-06-28 NOTE — PHYSICAL EXAM
[No Acute Distress] : no acute distress [Well Nourished] : well nourished [Well-Appearing] : well-appearing [Normal Sclera/Conjunctiva] : normal sclera/conjunctiva [Well Developed] : well developed [EOMI] : extraocular movements intact [PERRL] : pupils equal round and reactive to light [Normal Oropharynx] : the oropharynx was normal [Normal Outer Ear/Nose] : the outer ears and nose were normal in appearance [No Lymphadenopathy] : no lymphadenopathy [No JVD] : no jugular venous distention [Supple] : supple [No Respiratory Distress] : no respiratory distress  [Thyroid Normal, No Nodules] : the thyroid was normal and there were no nodules present [Clear to Auscultation] : lungs were clear to auscultation bilaterally [Normal Rate] : normal rate  [No Accessory Muscle Use] : no accessory muscle use [Regular Rhythm] : with a regular rhythm [No Murmur] : no murmur heard [Normal S1, S2] : normal S1 and S2 [No Carotid Bruits] : no carotid bruits [No Abdominal Bruit] : a ~M bruit was not heard ~T in the abdomen [No Varicosities] : no varicosities [Pedal Pulses Present] : the pedal pulses are present [No Edema] : there was no peripheral edema [No Palpable Aorta] : no palpable aorta [No Extremity Clubbing/Cyanosis] : no extremity clubbing/cyanosis [Non Tender] : non-tender [Soft] : abdomen soft [Non-distended] : non-distended [Normal Bowel Sounds] : normal bowel sounds [No HSM] : no HSM [No Masses] : no abdominal mass palpated [Normal Anterior Cervical Nodes] : no anterior cervical lymphadenopathy [Normal Posterior Cervical Nodes] : no posterior cervical lymphadenopathy [No CVA Tenderness] : no CVA  tenderness [No Spinal Tenderness] : no spinal tenderness [No Joint Swelling] : no joint swelling [No Rash] : no rash [Grossly Normal Strength/Tone] : grossly normal strength/tone [Normal Gait] : normal gait [Coordination Grossly Intact] : coordination grossly intact [No Focal Deficits] : no focal deficits [Deep Tendon Reflexes (DTR)] : deep tendon reflexes were 2+ and symmetric [Normal Affect] : the affect was normal [Normal Insight/Judgement] : insight and judgment were intact

## 2019-08-16 ENCOUNTER — OUTPATIENT (OUTPATIENT)
Dept: OUTPATIENT SERVICES | Facility: HOSPITAL | Age: 79
LOS: 1 days | End: 2019-08-16
Payer: MEDICARE

## 2019-08-16 DIAGNOSIS — Z01.818 ENCOUNTER FOR OTHER PREPROCEDURAL EXAMINATION: ICD-10-CM

## 2019-08-16 PROCEDURE — 71045 X-RAY EXAM CHEST 1 VIEW: CPT

## 2019-08-16 PROCEDURE — 71045 X-RAY EXAM CHEST 1 VIEW: CPT | Mod: 26

## 2019-08-20 ENCOUNTER — RX RENEWAL (OUTPATIENT)
Age: 79
End: 2019-08-20

## 2019-08-20 DIAGNOSIS — D72.829 ELEVATED WHITE BLOOD CELL COUNT, UNSPECIFIED: ICD-10-CM

## 2019-08-26 PROBLEM — D72.829 LEUKOCYTOSIS: Status: ACTIVE | Noted: 2018-01-29

## 2019-08-27 LAB
ANION GAP SERPL CALC-SCNC: 12 MMOL/L
BASOPHILS # BLD AUTO: 0.02 K/UL
BASOPHILS NFR BLD AUTO: 0.2 %
BUN SERPL-MCNC: 19 MG/DL
CALCIUM SERPL-MCNC: 8.9 MG/DL
CHLORIDE SERPL-SCNC: 98 MMOL/L
CO2 SERPL-SCNC: 27 MMOL/L
CREAT SERPL-MCNC: 0.37 MG/DL
EOSINOPHIL # BLD AUTO: 0.23 K/UL
EOSINOPHIL NFR BLD AUTO: 1.9 %
GLUCOSE SERPL-MCNC: 155 MG/DL
HCT VFR BLD CALC: 34.4 %
HGB BLD-MCNC: 11.2 G/DL
IMM GRANULOCYTES NFR BLD AUTO: 0.6 %
LYMPHOCYTES # BLD AUTO: 0.58 K/UL
LYMPHOCYTES NFR BLD AUTO: 4.9 %
MAN DIFF?: NORMAL
MCHC RBC-ENTMCNC: 28.2 PG
MCHC RBC-ENTMCNC: 32.6 GM/DL
MCV RBC AUTO: 86.6 FL
MONOCYTES # BLD AUTO: 1.1 K/UL
MONOCYTES NFR BLD AUTO: 9.2 %
NEUTROPHILS # BLD AUTO: 9.91 K/UL
NEUTROPHILS NFR BLD AUTO: 83.2 %
PLATELET # BLD AUTO: 383 K/UL
POTASSIUM SERPL-SCNC: 3.9 MMOL/L
RBC # BLD: 3.97 M/UL
RBC # FLD: 14.9 %
SODIUM SERPL-SCNC: 137 MMOL/L
WBC # FLD AUTO: 11.91 K/UL

## 2019-08-29 ENCOUNTER — INPATIENT (INPATIENT)
Facility: HOSPITAL | Age: 79
LOS: 2 days | Discharge: HOME | DRG: 291 | End: 2019-09-01
Attending: INTERNAL MEDICINE | Admitting: INTERNAL MEDICINE
Payer: MEDICARE

## 2019-08-29 VITALS
WEIGHT: 139.99 LBS | SYSTOLIC BLOOD PRESSURE: 139 MMHG | TEMPERATURE: 97 F | HEIGHT: 72 IN | DIASTOLIC BLOOD PRESSURE: 104 MMHG | OXYGEN SATURATION: 97 % | HEART RATE: 79 BPM | RESPIRATION RATE: 24 BRPM

## 2019-08-29 DIAGNOSIS — R06.02 SHORTNESS OF BREATH: ICD-10-CM

## 2019-08-29 LAB
ALBUMIN SERPL ELPH-MCNC: 2.6 G/DL — LOW (ref 3.3–5)
ALP SERPL-CCNC: 89 U/L — SIGNIFICANT CHANGE UP (ref 40–120)
ALT FLD-CCNC: 15 U/L DA — SIGNIFICANT CHANGE UP (ref 10–45)
ANION GAP SERPL CALC-SCNC: 8 MMOL/L — SIGNIFICANT CHANGE UP (ref 5–17)
APPEARANCE UR: CLEAR — SIGNIFICANT CHANGE UP
APTT BLD: 31.6 SEC — SIGNIFICANT CHANGE UP (ref 27.5–36.3)
AST SERPL-CCNC: 32 U/L — SIGNIFICANT CHANGE UP (ref 10–40)
BASOPHILS # BLD AUTO: 0.04 K/UL — SIGNIFICANT CHANGE UP (ref 0–0.2)
BASOPHILS NFR BLD AUTO: 0.3 % — SIGNIFICANT CHANGE UP (ref 0–2)
BILIRUB SERPL-MCNC: 0.4 MG/DL — SIGNIFICANT CHANGE UP (ref 0.2–1.2)
BILIRUB UR-MCNC: NEGATIVE — SIGNIFICANT CHANGE UP
BUN SERPL-MCNC: 27 MG/DL — HIGH (ref 7–23)
CALCIUM SERPL-MCNC: 8.8 MG/DL — SIGNIFICANT CHANGE UP (ref 8.4–10.5)
CHLORIDE SERPL-SCNC: 97 MMOL/L — SIGNIFICANT CHANGE UP (ref 96–108)
CO2 SERPL-SCNC: 26 MMOL/L — SIGNIFICANT CHANGE UP (ref 22–31)
COLOR SPEC: YELLOW — SIGNIFICANT CHANGE UP
COMMENT - URINE: SIGNIFICANT CHANGE UP
COMMENT - URINE: SIGNIFICANT CHANGE UP
CREAT SERPL-MCNC: 0.51 MG/DL — SIGNIFICANT CHANGE UP (ref 0.5–1.3)
DIFF PNL FLD: ABNORMAL
EOSINOPHIL # BLD AUTO: 0.19 K/UL — SIGNIFICANT CHANGE UP (ref 0–0.5)
EOSINOPHIL NFR BLD AUTO: 1.4 % — SIGNIFICANT CHANGE UP (ref 0–6)
EPI CELLS # UR: SIGNIFICANT CHANGE UP
GLUCOSE SERPL-MCNC: 170 MG/DL — HIGH (ref 70–99)
GLUCOSE UR QL: NEGATIVE — SIGNIFICANT CHANGE UP
HCT VFR BLD CALC: 32.4 % — LOW (ref 39–50)
HGB BLD-MCNC: 10.8 G/DL — LOW (ref 13–17)
IMM GRANULOCYTES NFR BLD AUTO: 0.6 % — SIGNIFICANT CHANGE UP (ref 0–1.5)
INR BLD: 1.17 RATIO — HIGH (ref 0.88–1.16)
KETONES UR-MCNC: NEGATIVE — SIGNIFICANT CHANGE UP
LACTATE SERPL-SCNC: 1.8 MMOL/L — SIGNIFICANT CHANGE UP (ref 0.7–2)
LEUKOCYTE ESTERASE UR-ACNC: NEGATIVE — SIGNIFICANT CHANGE UP
LYMPHOCYTES # BLD AUTO: 0.66 K/UL — LOW (ref 1–3.3)
LYMPHOCYTES # BLD AUTO: 5 % — LOW (ref 13–44)
MCHC RBC-ENTMCNC: 28.9 PG — SIGNIFICANT CHANGE UP (ref 27–34)
MCHC RBC-ENTMCNC: 33.3 GM/DL — SIGNIFICANT CHANGE UP (ref 32–36)
MCV RBC AUTO: 86.6 FL — SIGNIFICANT CHANGE UP (ref 80–100)
MONOCYTES # BLD AUTO: 0.88 K/UL — SIGNIFICANT CHANGE UP (ref 0–0.9)
MONOCYTES NFR BLD AUTO: 6.6 % — SIGNIFICANT CHANGE UP (ref 2–14)
NEUTROPHILS # BLD AUTO: 11.43 K/UL — HIGH (ref 1.8–7.4)
NEUTROPHILS NFR BLD AUTO: 86.1 % — HIGH (ref 43–77)
NITRITE UR-MCNC: NEGATIVE — SIGNIFICANT CHANGE UP
NRBC # BLD: 0 /100 WBCS — SIGNIFICANT CHANGE UP (ref 0–0)
NT-PROBNP SERPL-SCNC: 3524 PG/ML — HIGH (ref 0–300)
PH UR: 5 — SIGNIFICANT CHANGE UP (ref 5–8)
PLATELET # BLD AUTO: 404 K/UL — HIGH (ref 150–400)
POTASSIUM SERPL-MCNC: 4.4 MMOL/L — SIGNIFICANT CHANGE UP (ref 3.5–5.3)
POTASSIUM SERPL-SCNC: 4.4 MMOL/L — SIGNIFICANT CHANGE UP (ref 3.5–5.3)
PROCALCITONIN SERPL-MCNC: 0.18 NG/ML — HIGH
PROT SERPL-MCNC: 6.8 G/DL — SIGNIFICANT CHANGE UP (ref 6–8.3)
PROT UR-MCNC: 30 MG/DL
PROTHROM AB SERPL-ACNC: 13.2 SEC — HIGH (ref 10–12.9)
RBC # BLD: 3.74 M/UL — LOW (ref 4.2–5.8)
RBC # FLD: 14.8 % — HIGH (ref 10.3–14.5)
RBC CASTS # UR COMP ASSIST: SIGNIFICANT CHANGE UP /HPF (ref 0–4)
SODIUM SERPL-SCNC: 131 MMOL/L — LOW (ref 135–145)
SP GR SPEC: 1.01 — SIGNIFICANT CHANGE UP (ref 1.01–1.02)
TROPONIN I SERPL-MCNC: 0.03 NG/ML — SIGNIFICANT CHANGE UP (ref 0.02–0.06)
UROBILINOGEN FLD QL: NEGATIVE — SIGNIFICANT CHANGE UP
WBC # BLD: 13.28 K/UL — HIGH (ref 3.8–10.5)
WBC # FLD AUTO: 13.28 K/UL — HIGH (ref 3.8–10.5)
WBC UR QL: SIGNIFICANT CHANGE UP /HPF (ref 0–5)

## 2019-08-29 PROCEDURE — 99223 1ST HOSP IP/OBS HIGH 75: CPT

## 2019-08-29 PROCEDURE — 74176 CT ABD & PELVIS W/O CONTRAST: CPT | Mod: 26

## 2019-08-29 PROCEDURE — 93010 ELECTROCARDIOGRAM REPORT: CPT

## 2019-08-29 PROCEDURE — 71045 X-RAY EXAM CHEST 1 VIEW: CPT | Mod: 26

## 2019-08-29 PROCEDURE — 99285 EMERGENCY DEPT VISIT HI MDM: CPT

## 2019-08-29 RX ORDER — MINOCYCLINE HYDROCHLORIDE 45 MG/1
100 TABLET, EXTENDED RELEASE ORAL DAILY
Refills: 0 | Status: DISCONTINUED | OUTPATIENT
Start: 2019-08-29 | End: 2019-09-01

## 2019-08-29 RX ORDER — SODIUM CHLORIDE 9 MG/ML
1950 INJECTION INTRAMUSCULAR; INTRAVENOUS; SUBCUTANEOUS ONCE
Refills: 0 | Status: COMPLETED | OUTPATIENT
Start: 2019-08-29 | End: 2019-08-29

## 2019-08-29 RX ORDER — BACLOFEN 100 %
20 POWDER (GRAM) MISCELLANEOUS THREE TIMES A DAY
Refills: 0 | Status: DISCONTINUED | OUTPATIENT
Start: 2019-08-29 | End: 2019-08-30

## 2019-08-29 RX ORDER — CEFTRIAXONE 500 MG/1
1000 INJECTION, POWDER, FOR SOLUTION INTRAMUSCULAR; INTRAVENOUS ONCE
Refills: 0 | Status: COMPLETED | OUTPATIENT
Start: 2019-08-29 | End: 2019-08-29

## 2019-08-29 RX ORDER — ACETAMINOPHEN 500 MG
650 TABLET ORAL EVERY 6 HOURS
Refills: 0 | Status: DISCONTINUED | OUTPATIENT
Start: 2019-08-29 | End: 2019-09-01

## 2019-08-29 RX ORDER — DIAZEPAM 5 MG
2 TABLET ORAL
Refills: 0 | Status: DISCONTINUED | OUTPATIENT
Start: 2019-08-29 | End: 2019-09-01

## 2019-08-29 RX ADMIN — SODIUM CHLORIDE 1950 MILLILITER(S): 9 INJECTION INTRAMUSCULAR; INTRAVENOUS; SUBCUTANEOUS at 23:35

## 2019-08-29 RX ADMIN — CEFTRIAXONE 1000 MILLIGRAM(S): 500 INJECTION, POWDER, FOR SOLUTION INTRAMUSCULAR; INTRAVENOUS at 23:50

## 2019-08-29 RX ADMIN — SODIUM CHLORIDE 1950 MILLILITER(S): 9 INJECTION INTRAMUSCULAR; INTRAVENOUS; SUBCUTANEOUS at 23:17

## 2019-08-29 RX ADMIN — CEFTRIAXONE 100 MILLIGRAM(S): 500 INJECTION, POWDER, FOR SOLUTION INTRAMUSCULAR; INTRAVENOUS at 23:17

## 2019-08-29 NOTE — H&P ADULT - NSHPPHYSICALEXAM_GEN_ALL_CORE
Vital Signs (24 Hrs):  T(C): 36.7 (08-29-19 @ 21:30), Max: 36.7 (08-29-19 @ 21:30)  HR: 92 (08-29-19 @ 21:45) (79 - 92)  BP: 129/85 (08-29-19 @ 21:45) (129/75 - 139/104)  RR: 21 (08-29-19 @ 21:45) (21 - 27)  SpO2: 97% (08-29-19 @ 21:45) (96% - 99%)  Daily Height in cm: 182.88 (29 Aug 2019 21:13)
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
## 2019-08-29 NOTE — ED PROVIDER NOTE - SKIN, MLM
Skin normal color for race, warm, dry and intact. No evidence of rash. multiple stage 4 decubitus ,B/L sacrum, left thorax

## 2019-08-29 NOTE — ED ADULT TRIAGE NOTE - CHIEF COMPLAINT QUOTE
Pt BIB EMS with tachypnea, shortness of breath and rhonchi on 100% NRB with duoneb nebulizer. Pt BIB EMS with tachypnea, shortness of breath and rhonchi on 100% NRB with duoneb nebulizer.  Pt c/o abd pain, abd is distended with colostomy bag in place.
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
## 2019-08-29 NOTE — H&P ADULT - NSICDXPASTSURGICALHX_GEN_ALL_CORE_FT
PAST SURGICAL HISTORY:  H/O colostomy     H/O rectal sphincterotomy     History of bilateral cataract extraction

## 2019-08-29 NOTE — ED ADULT NURSE NOTE - OBJECTIVE STATEMENT
Pt presents to ED w/ c/o SOB & productive coughing x 2days along with right lower quadrant abdominal pain for 2-3 weeks. Pt is unable to rate pain due to quadriplegia from chest down. Pt has right colostomy bag with adequate bowel movements, no diarrhea. Pt denies fevers, nausea and vomiting. Pt has ulcers on right buttock, right back/shoulder, left flank, buttock & foot, & in between 3rd & 4th toes on right foot, currently being treated & seen by wound care doctor & visiting nurse.
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
## 2019-08-29 NOTE — ED PROVIDER NOTE - CONSTITUTIONAL, MLM
normal... Ill appearing, well nourished, awake, alert, oriented to person, place, time/situation and in mild distress.

## 2019-08-29 NOTE — H&P ADULT - NSICDXFAMILYHX_GEN_ALL_CORE_FT
FAMILY HISTORY:  Father  Still living? Unknown  Family history of tuberculosis, Age at diagnosis: Age Unknown    Mother  Still living? Unknown  Family history of leukemia, Age at diagnosis: Age Unknown

## 2019-08-29 NOTE — ED ADULT NURSE NOTE - CHIEF COMPLAINT QUOTE
Pt BIB EMS with tachypnea, shortness of breath and rhonchi on 100% NRB with duoneb nebulizer.  Pt c/o abd pain, abd is distended with colostomy bag in place.

## 2019-08-29 NOTE — H&P ADULT - RS GEN PE MLT RESP DETAILS PC
respirations non-labored/decreased breath sounds at bases/good air movement/airway patent/breath sounds equal
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
## 2019-08-30 DIAGNOSIS — I50.21 ACUTE SYSTOLIC (CONGESTIVE) HEART FAILURE: ICD-10-CM

## 2019-08-30 LAB
ANION GAP SERPL CALC-SCNC: 6 MMOL/L — SIGNIFICANT CHANGE UP (ref 5–17)
BASOPHILS # BLD AUTO: 0.02 K/UL — SIGNIFICANT CHANGE UP (ref 0–0.2)
BASOPHILS NFR BLD AUTO: 0.2 % — SIGNIFICANT CHANGE UP (ref 0–2)
BUN SERPL-MCNC: 18 MG/DL — SIGNIFICANT CHANGE UP (ref 7–23)
CALCIUM SERPL-MCNC: 9 MG/DL — SIGNIFICANT CHANGE UP (ref 8.4–10.5)
CHLORIDE SERPL-SCNC: 104 MMOL/L — SIGNIFICANT CHANGE UP (ref 96–108)
CO2 SERPL-SCNC: 33 MMOL/L — HIGH (ref 22–31)
CREAT SERPL-MCNC: 0.5 MG/DL — SIGNIFICANT CHANGE UP (ref 0.5–1.3)
EOSINOPHIL # BLD AUTO: 0.33 K/UL — SIGNIFICANT CHANGE UP (ref 0–0.5)
EOSINOPHIL NFR BLD AUTO: 3.6 % — SIGNIFICANT CHANGE UP (ref 0–6)
GLUCOSE SERPL-MCNC: 144 MG/DL — HIGH (ref 70–99)
HCT VFR BLD CALC: 32.8 % — LOW (ref 39–50)
HGB BLD-MCNC: 10.7 G/DL — LOW (ref 13–17)
IMM GRANULOCYTES NFR BLD AUTO: 0.5 % — SIGNIFICANT CHANGE UP (ref 0–1.5)
LYMPHOCYTES # BLD AUTO: 0.51 K/UL — LOW (ref 1–3.3)
LYMPHOCYTES # BLD AUTO: 5.5 % — LOW (ref 13–44)
MAGNESIUM SERPL-MCNC: 2.1 MG/DL — SIGNIFICANT CHANGE UP (ref 1.6–2.6)
MCHC RBC-ENTMCNC: 28.5 PG — SIGNIFICANT CHANGE UP (ref 27–34)
MCHC RBC-ENTMCNC: 32.6 GM/DL — SIGNIFICANT CHANGE UP (ref 32–36)
MCV RBC AUTO: 87.5 FL — SIGNIFICANT CHANGE UP (ref 80–100)
MONOCYTES # BLD AUTO: 0.69 K/UL — SIGNIFICANT CHANGE UP (ref 0–0.9)
MONOCYTES NFR BLD AUTO: 7.4 % — SIGNIFICANT CHANGE UP (ref 2–14)
NEUTROPHILS # BLD AUTO: 7.67 K/UL — HIGH (ref 1.8–7.4)
NEUTROPHILS NFR BLD AUTO: 82.8 % — HIGH (ref 43–77)
NRBC # BLD: 0 /100 WBCS — SIGNIFICANT CHANGE UP (ref 0–0)
PLATELET # BLD AUTO: 374 K/UL — SIGNIFICANT CHANGE UP (ref 150–400)
POTASSIUM SERPL-MCNC: 3.9 MMOL/L — SIGNIFICANT CHANGE UP (ref 3.5–5.3)
POTASSIUM SERPL-SCNC: 3.9 MMOL/L — SIGNIFICANT CHANGE UP (ref 3.5–5.3)
RBC # BLD: 3.75 M/UL — LOW (ref 4.2–5.8)
RBC # FLD: 14.9 % — HIGH (ref 10.3–14.5)
SODIUM SERPL-SCNC: 143 MMOL/L — SIGNIFICANT CHANGE UP (ref 135–145)
TROPONIN I SERPL-MCNC: 0.04 NG/ML — SIGNIFICANT CHANGE UP (ref 0.02–0.06)
TROPONIN I SERPL-MCNC: 0.04 NG/ML — SIGNIFICANT CHANGE UP (ref 0.02–0.06)
TSH SERPL-MCNC: 5.32 UIU/ML — HIGH (ref 0.27–4.2)
WBC # BLD: 9.27 K/UL — SIGNIFICANT CHANGE UP (ref 3.8–10.5)
WBC # FLD AUTO: 9.27 K/UL — SIGNIFICANT CHANGE UP (ref 3.8–10.5)

## 2019-08-30 PROCEDURE — 99223 1ST HOSP IP/OBS HIGH 75: CPT

## 2019-08-30 PROCEDURE — 93308 TTE F-UP OR LMTD: CPT | Mod: 26,59

## 2019-08-30 PROCEDURE — 93306 TTE W/DOPPLER COMPLETE: CPT | Mod: 26

## 2019-08-30 PROCEDURE — 99233 SBSQ HOSP IP/OBS HIGH 50: CPT

## 2019-08-30 PROCEDURE — 71275 CT ANGIOGRAPHY CHEST: CPT | Mod: 26

## 2019-08-30 RX ORDER — FUROSEMIDE 40 MG
40 TABLET ORAL ONCE
Refills: 0 | Status: COMPLETED | OUTPATIENT
Start: 2019-08-30 | End: 2019-08-30

## 2019-08-30 RX ORDER — BACLOFEN 100 %
20 POWDER (GRAM) MISCELLANEOUS
Refills: 0 | Status: DISCONTINUED | OUTPATIENT
Start: 2019-08-30 | End: 2019-09-01

## 2019-08-30 RX ORDER — ENOXAPARIN SODIUM 100 MG/ML
40 INJECTION SUBCUTANEOUS DAILY
Refills: 0 | Status: DISCONTINUED | OUTPATIENT
Start: 2019-08-30 | End: 2019-09-01

## 2019-08-30 RX ORDER — FUROSEMIDE 40 MG
20 TABLET ORAL ONCE
Refills: 0 | Status: COMPLETED | OUTPATIENT
Start: 2019-08-30 | End: 2019-08-30

## 2019-08-30 RX ADMIN — Medication 40 MILLIGRAM(S): at 23:45

## 2019-08-30 RX ADMIN — Medication 2 MILLIGRAM(S): at 05:53

## 2019-08-30 RX ADMIN — Medication 20 MILLIGRAM(S): at 05:53

## 2019-08-30 RX ADMIN — ENOXAPARIN SODIUM 40 MILLIGRAM(S): 100 INJECTION SUBCUTANEOUS at 11:41

## 2019-08-30 RX ADMIN — MINOCYCLINE HYDROCHLORIDE 100 MILLIGRAM(S): 45 TABLET, EXTENDED RELEASE ORAL at 12:48

## 2019-08-30 RX ADMIN — Medication 2 MILLIGRAM(S): at 17:50

## 2019-08-30 RX ADMIN — Medication 20 MILLIGRAM(S): at 18:11

## 2019-08-30 RX ADMIN — Medication 20 MILLIGRAM(S): at 13:16

## 2019-08-30 RX ADMIN — Medication 20 MILLIGRAM(S): at 02:13

## 2019-08-30 NOTE — DIETITIAN INITIAL EVALUATION ADULT. - SIGNS/SYMPTOMS
multiple noted pressures injuries (stage 4 & stage 2 noted) as evidence by noted muscle wasting & loss of body fat
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
## 2019-08-30 NOTE — CONSULT NOTE ADULT - SUBJECTIVE AND OBJECTIVE BOX
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 Structurally normal tricuspid valve, with normal leaflet   excursion. Moderate-severe tricuspid regurgitation is visualized.   Estimated pulmonary artery systolic pressure is 95.0 mmHg assuming a   right atrial pressure of 15 mmHg, which is consistent with severe   pulmonary hypertension.  Aortic Valve: Peak transaortic gradient equals 4.1 mmHg, mean transaortic   gradient equals 2.3 mmHg, the calculated aortic valve area equals 2.63   cm² by the continuity equation consistent with normally opening aortic   valve. Mild aortic valve regurgitation is seen.  Pulmonic Valve: Structurally normal pulmonic valve, with normal leaflet   excursion. Moderate pulmonic valve regurgitation.  Aorta: There is dilatation of the aortic root.  Pulmonary Artery: The main pulmonary artery is normal in size. Pulmonary   hypertension is present.       Summary:   1. Left ventricular ejection fraction, by visual estimation, is 48%.   2. Mildly decreased global left ventricular systolic function.   3. Spectral Doppler shows restrictive pattern of left ventricular   myocardial filling (Grade III diastolic dysfunction).   4.Mild thickening and calcification of the anterior and posterior   mitral valve leaflets.   5. Moderate-severe tricuspid regurgitation.   6. Mild aortic regurgitation.   7. Moderate pulmonic valve regurgitation.   8. Dilatation of the aortic root.   9. Estimated pulmonary artery systolic pressure is 95.0 mmHg assuming a   right atrial pressure of 15 mmHg, which is consistent with severe   pulmonary hypertension.  10. Pulmonary hypertension is present.  11. LA volume Index is 51.5 ml/m² ml/m2.    [id number] Sherif Narayanan MD,FACC , Electronically signed on 2019 at   12:11:36 PM       < end of copied text >

## 2019-08-30 NOTE — CHART NOTE - NSCHARTNOTEFT_GEN_A_CORE
79M admitted with CHR exacerbation now with c/o of "having difficulty breathing."  -on review of orders he did NOT receive his lasix today.    -BP currently with   -on exam crackles noted bilaterally  -currently O2 sat maintaned >90%    -renal function normal    -will give dose of lasix now, monitor urine output, and f/u BMP in AM to ensure K+ WNL

## 2019-08-30 NOTE — PATIENT PROFILE ADULT - NSPROIMPLANTSMEDDEV_GEN_A_NUR
as per pt he has Rods in left arm, 2 rods on right femur as per pt he has Rods in left arm, 2 rods on right femur, colostomy

## 2019-08-30 NOTE — PROGRESS NOTE ADULT - SUBJECTIVE AND OBJECTIVE BOX
Patient is a 79y old  Male who presents with a chief complaint of dyspnea (30 Aug 2019 13:59)      Patient seen and examined at bedside. No overnight events reported. Pt reports SOB improved. No CP. No fever, chills    ALLERGIES:  Bactrim (Rash)  Cipro (Unknown)  Levaquin (Unknown)  penicillin (Rash)  sulfa drugs (Unknown)    MEDICATIONS  (STANDING):  baclofen 20 milliGRAM(s) Oral three times a day  diazepam    Tablet 2 milliGRAM(s) Oral two times a day  enoxaparin Injectable 40 milliGRAM(s) SubCutaneous daily  minocycline 100 milliGRAM(s) Oral daily    MEDICATIONS  (PRN):  acetaminophen   Tablet .. 650 milliGRAM(s) Oral every 6 hours PRN Temp greater or equal to 38C (100.4F), Mild Pain (1 - 3)    Vital Signs Last 24 Hrs  T(F): 96 (30 Aug 2019 11:00), Max: 98.2 (29 Aug 2019 23:10)  HR: 72 (30 Aug 2019 11:00) (62 - 92)  BP: 160/72 (30 Aug 2019 11:00) (120/60 - 165/77)  RR: 18 (30 Aug 2019 11:00) (18 - 28)  SpO2: 100% (30 Aug 2019 11:00) (96% - 100%)  I&O's Summary    29 Aug 2019 07:  -  30 Aug 2019 07:00  --------------------------------------------------------  IN: 0 mL / OUT: 2550 mL / NET: -2550 mL    30 Aug 2019 07:  -  30 Aug 2019 15:30  --------------------------------------------------------  IN: 480 mL / OUT: 700 mL / NET: -220 mL          GENERAL: NAD, well-developed  HEAD:  Atraumatic, Normocephalic  EYES: EOMI, PERRLA, conjunctiva and sclera clear  NECK: Supple, No JVD  CHEST/LUNG: Clear to auscultation bilaterally; No wheeze  HEART: Regular rate and rhythm; No murmurs, rubs, or gallops  ABDOMEN: Soft, Nontender, Nondistended; Bowel sounds present  EXTREMITIES:  2+ Peripheral Pulses, No clubbing, cyanosis, or edema  PSYCH: AAOx3  NEUROLOGY: non-focal  SKIN: No rashes or lesions    LABS:                        10.7   9.27  )-----------( 374      ( 30 Aug 2019 08:38 )             32.8         143  |  104  |  18  ----------------------------<  144  3.9   |  33  |  0.50    Ca    9.0      30 Aug 2019 08:38  Mg     2.1         TPro  6.8  /  Alb  2.6  /  TBili  0.4  /  DBili  x   /  AST  32  /  ALT  15  /  AlkPhos  89        eGFR if Non African American: 101 mL/min/1.73M2 (19 @ 08:38)  eGFR if : 117 mL/min/1.73M2 (19 @ 08:38)    PT/INR - ( 29 Aug 2019 21:20 )   PT: 13.2 sec;   INR: 1.17 ratio         PTT - ( 29 Aug 2019 21:20 )  PTT:31.6 sec  Lactate, Blood: 1.8 mmol/L ( @ 21:20)    Procalcitonin, Serum: 0.18 ng/mL (19 @ 21:20)    CARDIAC MARKERS ( 30 Aug 2019 08:38 )  .035 ng/mL / x     / x     / x     / x      CARDIAC MARKERS ( 30 Aug 2019 01:20 )  .035 ng/mL / x     / x     / x     / x      CARDIAC MARKERS ( 29 Aug 2019 21:20 )  .027 ng/mL / x     / x     / x     / x          TSH 5.32   TSH with FT4 reflex --  Total T3 --      Urinalysis Basic - ( 29 Aug 2019 23:00 )    Color: Yellow / Appearance: Clear / S.015 / pH: x  Gluc: x / Ketone: Negative  / Bili: Negative / Urobili: Negative   Blood: x / Protein: 30 mg/dL / Nitrite: Negative   Leuk Esterase: Negative / RBC: 0-4 /HPF / WBC 0-2 /HPF   Sq Epi: x / Non Sq Epi: Neg.-Few / Bacteria: x Patient is a 79y old  Male who presents with a chief complaint of dyspnea (30 Aug 2019 13:59)      Patient seen and examined at bedside. No overnight events reported. Pt reports SOB improved. No CP. No fever, chills    ALLERGIES:  Bactrim (Rash)  Cipro (Unknown)  Levaquin (Unknown)  penicillin (Rash)  sulfa drugs (Unknown)    MEDICATIONS  (STANDING):  baclofen 20 milliGRAM(s) Oral three times a day  diazepam    Tablet 2 milliGRAM(s) Oral two times a day  enoxaparin Injectable 40 milliGRAM(s) SubCutaneous daily  minocycline 100 milliGRAM(s) Oral daily    MEDICATIONS  (PRN):  acetaminophen   Tablet .. 650 milliGRAM(s) Oral every 6 hours PRN Temp greater or equal to 38C (100.4F), Mild Pain (1 - 3)    Vital Signs Last 24 Hrs  T(F): 96 (30 Aug 2019 11:00), Max: 98.2 (29 Aug 2019 23:10)  HR: 72 (30 Aug 2019 11:00) (62 - 92)  BP: 160/72 (30 Aug 2019 11:00) (120/60 - 165/77)  RR: 18 (30 Aug 2019 11:00) (18 - 28)  SpO2: 100% (30 Aug 2019 11:00) (96% - 100%)  I&O's Summary    29 Aug 2019 07:  -  30 Aug 2019 07:00  --------------------------------------------------------  IN: 0 mL / OUT: 2550 mL / NET: -2550 mL    30 Aug 2019 07:  -  30 Aug 2019 15:30  --------------------------------------------------------  IN: 480 mL / OUT: 700 mL / NET: -220 mL          GENERAL: NAD  HEAD:  Atraumatic, Normocephalic  EYES: EOMI, PERRLA, conjunctiva and sclera clear  NECK: Supple  CHEST/LUNG: Clear to auscultation bilaterally; No wheeze  HEART: Regular rate and rhythm; No murmurs, rubs, or gallops  ABDOMEN: Soft, Nontender, Nondistended; Bowel sounds present  EXTREMITIES:  no edema  NEUROLOGY: non-focal  SKIN: No rashes or lesions    LABS:                        10.7   9.27  )-----------( 374      ( 30 Aug 2019 08:38 )             32.8         143  |  104  |  18  ----------------------------<  144  3.9   |  33  |  0.50    Ca    9.0      30 Aug 2019 08:38  Mg     2.1         TPro  6.8  /  Alb  2.6  /  TBili  0.4  /  DBili  x   /  AST  32  /  ALT  15  /  AlkPhos  89        eGFR if Non African American: 101 mL/min/1.73M2 (19 @ 08:38)  eGFR if : 117 mL/min/1.73M2 (19 @ 08:38)    PT/INR - ( 29 Aug 2019 21:20 )   PT: 13.2 sec;   INR: 1.17 ratio        PTT - ( 29 Aug 2019 21:20 )  PTT:31.6 sec  Lactate, Blood: 1.8 mmol/L ( @ 21:20)    Procalcitonin, Serum: 0.18 ng/mL (19 @ 21:20)    CARDIAC MARKERS ( 30 Aug 2019 08:38 )  .035 ng/mL / x     / x     / x     / x      CARDIAC MARKERS ( 30 Aug 2019 01:20 )  .035 ng/mL / x     / x     / x     / x      CARDIAC MARKERS ( 29 Aug 2019 21:20 )  .027 ng/mL / x     / x     / x     / x          TSH 5.32   TSH with FT4 reflex --  Total T3 --      Urinalysis Basic - ( 29 Aug 2019 23:00 )    Color: Yellow / Appearance: Clear / S.015 / pH: x  Gluc: x / Ketone: Negative  / Bili: Negative / Urobili: Negative   Blood: x / Protein: 30 mg/dL / Nitrite: Negative   Leuk Esterase: Negative / RBC: 0-4 /HPF / WBC 0-2 /HPF   Sq Epi: x / Non Sq Epi: Neg.-Few / Bacteria: x
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
## 2019-08-30 NOTE — CHART NOTE - NSCHARTNOTEFT_GEN_A_CORE
Upon Nutritional Assessment by the Registered Dietitian your patient was determined to meet criteria / has evidence of the following diagnosis/diagnoses:          [X]  Moderate Protein Calorie Malnutrition         Findings as based on:  [X] Comprehensive Nutrition Assessment   [X] Nutrition Focused Physical Exam - Muscle wasting  &loss of body fat noted ( Temporal, Orbital, Clavicle, Scapular, Wasting Observed)  [X] Other: Poor PO Intake 7+ Days & Significant Weight Decrease Over Last Months     Nutrition Plan/Recommendations:    1) Prosource BID & Segun BID  2) Increase protein needs (add extra protein to breakfast meal/eggs)    PROVIDER Section:   By signing this assessment you are acknowledging and agree with the diagnosis/diagnoses assigned by the Registered Dietitian    Sada De La Torre RDN

## 2019-08-31 LAB
-  COAGULASE NEGATIVE STAPHYLOCOCCUS: SIGNIFICANT CHANGE UP
ANION GAP SERPL CALC-SCNC: 7 MMOL/L — SIGNIFICANT CHANGE UP (ref 5–17)
BASOPHILS # BLD AUTO: 0.04 K/UL — SIGNIFICANT CHANGE UP (ref 0–0.2)
BASOPHILS NFR BLD AUTO: 0.4 % — SIGNIFICANT CHANGE UP (ref 0–2)
BUN SERPL-MCNC: 17 MG/DL — SIGNIFICANT CHANGE UP (ref 7–23)
CALCIUM SERPL-MCNC: 8.8 MG/DL — SIGNIFICANT CHANGE UP (ref 8.4–10.5)
CHLORIDE SERPL-SCNC: 102 MMOL/L — SIGNIFICANT CHANGE UP (ref 96–108)
CO2 BLDA-SCNC: 35 MMOL/L — HIGH (ref 22–30)
CO2 SERPL-SCNC: 34 MMOL/L — HIGH (ref 22–31)
CREAT SERPL-MCNC: 0.59 MG/DL — SIGNIFICANT CHANGE UP (ref 0.5–1.3)
CULTURE RESULTS: SIGNIFICANT CHANGE UP
EOSINOPHIL # BLD AUTO: 0.16 K/UL — SIGNIFICANT CHANGE UP (ref 0–0.5)
EOSINOPHIL NFR BLD AUTO: 1.5 % — SIGNIFICANT CHANGE UP (ref 0–6)
GLUCOSE BLDC GLUCOMTR-MCNC: 141 MG/DL — HIGH (ref 70–99)
GLUCOSE BLDC GLUCOMTR-MCNC: 154 MG/DL — HIGH (ref 70–99)
GLUCOSE BLDC GLUCOMTR-MCNC: 158 MG/DL — HIGH (ref 70–99)
GLUCOSE BLDC GLUCOMTR-MCNC: 199 MG/DL — HIGH (ref 70–99)
GLUCOSE SERPL-MCNC: 169 MG/DL — HIGH (ref 70–99)
GRAM STN FLD: SIGNIFICANT CHANGE UP
HBA1C BLD-MCNC: 6.2 % — HIGH (ref 4–5.6)
HCT VFR BLD CALC: 31.6 % — LOW (ref 39–50)
HGB BLD-MCNC: 10.4 G/DL — LOW (ref 13–17)
HOROWITZ INDEX BLDA+IHG-RTO: SIGNIFICANT CHANGE UP
IMM GRANULOCYTES NFR BLD AUTO: 0.5 % — SIGNIFICANT CHANGE UP (ref 0–1.5)
LYMPHOCYTES # BLD AUTO: 0.43 K/UL — LOW (ref 1–3.3)
LYMPHOCYTES # BLD AUTO: 4.1 % — LOW (ref 13–44)
MAGNESIUM SERPL-MCNC: 2 MG/DL — SIGNIFICANT CHANGE UP (ref 1.6–2.6)
MCHC RBC-ENTMCNC: 27.8 PG — SIGNIFICANT CHANGE UP (ref 27–34)
MCHC RBC-ENTMCNC: 32.9 GM/DL — SIGNIFICANT CHANGE UP (ref 32–36)
MCV RBC AUTO: 84.5 FL — SIGNIFICANT CHANGE UP (ref 80–100)
METHOD TYPE: SIGNIFICANT CHANGE UP
MONOCYTES # BLD AUTO: 0.77 K/UL — SIGNIFICANT CHANGE UP (ref 0–0.9)
MONOCYTES NFR BLD AUTO: 7.3 % — SIGNIFICANT CHANGE UP (ref 2–14)
NEUTROPHILS # BLD AUTO: 9.09 K/UL — HIGH (ref 1.8–7.4)
NEUTROPHILS NFR BLD AUTO: 86.2 % — HIGH (ref 43–77)
NRBC # BLD: 0 /100 WBCS — SIGNIFICANT CHANGE UP (ref 0–0)
PCO2 BLDA: 49 MMHG — HIGH (ref 32–46)
PH BLDA: 7.45 — SIGNIFICANT CHANGE UP (ref 7.35–7.45)
PLATELET # BLD AUTO: 426 K/UL — HIGH (ref 150–400)
PO2 BLDA: 76 MMHG — SIGNIFICANT CHANGE UP (ref 74–108)
POTASSIUM SERPL-MCNC: 3.7 MMOL/L — SIGNIFICANT CHANGE UP (ref 3.5–5.3)
POTASSIUM SERPL-SCNC: 3.7 MMOL/L — SIGNIFICANT CHANGE UP (ref 3.5–5.3)
RBC # BLD: 3.74 M/UL — LOW (ref 4.2–5.8)
RBC # FLD: 14.8 % — HIGH (ref 10.3–14.5)
SAO2 % BLDA: 95 % — SIGNIFICANT CHANGE UP (ref 92–96)
SODIUM SERPL-SCNC: 143 MMOL/L — SIGNIFICANT CHANGE UP (ref 135–145)
SPECIMEN SOURCE: SIGNIFICANT CHANGE UP
T4 AB SER-ACNC: 5.8 UG/DL — SIGNIFICANT CHANGE UP (ref 4.6–12)
TROPONIN I SERPL-MCNC: 0.03 NG/ML — SIGNIFICANT CHANGE UP (ref 0.02–0.06)
WBC # BLD: 10.54 K/UL — HIGH (ref 3.8–10.5)
WBC # FLD AUTO: 10.54 K/UL — HIGH (ref 3.8–10.5)

## 2019-08-31 PROCEDURE — 99233 SBSQ HOSP IP/OBS HIGH 50: CPT

## 2019-08-31 PROCEDURE — 70450 CT HEAD/BRAIN W/O DYE: CPT | Mod: 26

## 2019-08-31 RX ORDER — LISINOPRIL 2.5 MG/1
5 TABLET ORAL DAILY
Refills: 0 | Status: DISCONTINUED | OUTPATIENT
Start: 2019-08-31 | End: 2019-09-01

## 2019-08-31 RX ORDER — VANCOMYCIN HCL 1 G
VIAL (EA) INTRAVENOUS
Refills: 0 | Status: DISCONTINUED | OUTPATIENT
Start: 2019-08-31 | End: 2019-08-31

## 2019-08-31 RX ORDER — VANCOMYCIN HCL 1 G
1000 VIAL (EA) INTRAVENOUS ONCE
Refills: 0 | Status: COMPLETED | OUTPATIENT
Start: 2019-08-31 | End: 2019-08-31

## 2019-08-31 RX ORDER — COLLAGENASE CLOSTRIDIUM HIST. 250 UNIT/G
1 OINTMENT (GRAM) TOPICAL DAILY
Refills: 0 | Status: DISCONTINUED | OUTPATIENT
Start: 2019-08-31 | End: 2019-09-01

## 2019-08-31 RX ORDER — METOPROLOL TARTRATE 50 MG
25 TABLET ORAL
Refills: 0 | Status: DISCONTINUED | OUTPATIENT
Start: 2019-08-31 | End: 2019-08-31

## 2019-08-31 RX ORDER — METOPROLOL TARTRATE 50 MG
25 TABLET ORAL DAILY
Refills: 0 | Status: DISCONTINUED | OUTPATIENT
Start: 2019-08-31 | End: 2019-09-01

## 2019-08-31 RX ORDER — LISINOPRIL 2.5 MG/1
2.5 TABLET ORAL DAILY
Refills: 0 | Status: DISCONTINUED | OUTPATIENT
Start: 2019-08-31 | End: 2019-08-31

## 2019-08-31 RX ADMIN — Medication 1 APPLICATION(S): at 17:28

## 2019-08-31 RX ADMIN — Medication 20 MILLIGRAM(S): at 08:45

## 2019-08-31 RX ADMIN — ENOXAPARIN SODIUM 40 MILLIGRAM(S): 100 INJECTION SUBCUTANEOUS at 13:53

## 2019-08-31 RX ADMIN — MINOCYCLINE HYDROCHLORIDE 100 MILLIGRAM(S): 45 TABLET, EXTENDED RELEASE ORAL at 13:54

## 2019-08-31 RX ADMIN — Medication 250 MILLIGRAM(S): at 14:52

## 2019-08-31 RX ADMIN — Medication 20 MILLIGRAM(S): at 17:28
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
## 2019-08-31 NOTE — CONSULT NOTE ADULT - SUBJECTIVE AND OBJECTIVE BOX
HPI:   Patient is a 79y male with remote spinal cord injury (), paraplegia, bedbound past 3 yrs, full assist, fulltime aides, decub's, wound infections, followed at [facility] wound care, suppressive minocycline (?infected R thigh hardware- prior ORIF with sinus tract), DM, colostomy, here since  b/c of dyspnea, concern for CHF.  Since arrival, progressive lethargy, now nonverbal, not eating.   Afebrile, but mild leukocytosis on admission prompting Bld Cxs- now reported as GPC clusters single bottle- Vanco started.  Pt provides no info- d/w wife and aide at bedside.     REVIEW OF SYSTEMS:  All other review of systems negative (Comprehensive ROS)    PAST MEDICAL & SURGICAL HISTORY:  Macular degeneration  Hard of hearing  Diabetes  Paraplegia  H/O rectal sphincterotomy  History of bilateral cataract extraction  H/O colostomy      Allergies  Bactrim (Rash)  Cipro (Unknown)  Levaquin (Unknown)  penicillin (Rash)  sulfa drugs (Unknown)    Antimicrobials Day # 1  minocycline 100 milliGRAM(s) Oral daily  vancomycin  IVPB        Other Medications:  acetaminophen   Tablet .. 650 milliGRAM(s) Oral every 6 hours PRN  baclofen 20 milliGRAM(s) Oral <User Schedule>  collagenase Ointment 1 Application(s) Topical daily  diazepam    Tablet 2 milliGRAM(s) Oral two times a day  enoxaparin Injectable 40 milliGRAM(s) SubCutaneous daily  lisinopril 5 milliGRAM(s) Oral daily  metoprolol succinate ER 25 milliGRAM(s) Oral daily      FAMILY HISTORY:  Family history of leukemia  Family history of tuberculosis      SOCIAL HISTORY:  Smoking: no    ETOH: no       T(F): 97.7 (19 @ 16:34), Max: 99.2 (19 @ 05:35)  HR: 68 (19 @ 16:34)  BP: 133/58 (19 @ 16:34)  RR: 16 (19 @ 16:34)  SpO2: 96% (19 @ 16:34)  Wt(kg): --    PHYSICAL EXAM:  General: no acute distress  Eyes: anicteric, no conjunctival injection, no discharge  Oropharynx: no lesions or injection 	  Neck: without adenopathy  Lungs: clear to auscultation  Heart: regular rate and rhythm; no murmur, rubs or gallops  Abdomen: soft, nondistended, nontender, colostomy functional  Condom cath, urine output  Skin: R lat thigh sinus tract, localized faint erythema only; R ischial deep, open wound, no odor; L buttock wound  Extremities: no edema  Neurologic: lethargic, poorly interactive    LAB RESULTS:                        10.4   10.54 )-----------( 426      ( 31 Aug 2019 05:40 )             31.6         143  |  102  |  17  ----------------------------<  169<H>  3.7   |  34<H>  |  0.59    Ca    8.8      31 Aug 2019 05:40  Mg     2.0         TPro  6.8  /  Alb  2.6<L>  /  TBili  0.4  /  DBili  x   /  AST  32  /  ALT  15  /  AlkPhos  89      Urinalysis Basic - ( 29 Aug 2019 23:00 )    Color: Yellow / Appearance: Clear / S.015 / pH: x  Gluc: x / Ketone: Negative  / Bili: Negative / Urobili: Negative   Blood: x / Protein: 30 mg/dL / Nitrite: Negative   Leuk Esterase: Negative / RBC: 0-4 /HPF / WBC 0-2 /HPF   Sq Epi: x / Non Sq Epi: Neg.-Few / Bacteria: x    MICROBIOLOGY:  RECENT CULTURES:   @ 23:00 .Urine Clean Catch (Midstream)     >=3 organisms. Probable collection contamination.     @ 21:20 .Blood Blood-Peripheral Blood Culture PCR    Growth in aerobic bottle: Gram Positive Cocci in Clusters      RADIOLOGY REVIEWED:  CT Angio Chest w/ IV Cont (19 @ 01:03) >  No CT evidence of pulmonary embolism.    Cardiomegaly. Small to moderate pericardial effusion, unchanged.    Small bilateral pleural effusions with minimal underlying compressive   atelectasis of lower lobes..    Moderate L3 compression fracture, new since .

## 2019-08-31 NOTE — PROGRESS NOTE ADULT - SUBJECTIVE AND OBJECTIVE BOX
SUBJ:  Patient is a 79y old  Male who presents with a chief complaint of dyspnea (31 Aug 2019 10:08)  chart reviewed and patient examined  case discussed with Dr. Narayanan   patient lethargic, sleeping, arousable       PAST MEDICAL & SURGICAL HISTORY:  Macular degeneration  Hard of hearing  Diabetes  Paraplegia  H/O rectal sphincterotomy  History of bilateral cataract extraction  H/O colostomy      MEDICATIONS  (STANDING):  baclofen 20 milliGRAM(s) Oral <User Schedule>  collagenase Ointment 1 Application(s) Topical daily  diazepam    Tablet 2 milliGRAM(s) Oral two times a day  enoxaparin Injectable 40 milliGRAM(s) SubCutaneous daily  minocycline 100 milliGRAM(s) Oral daily    MEDICATIONS  (PRN):  acetaminophen   Tablet .. 650 milliGRAM(s) Oral every 6 hours PRN Temp greater or equal to 38C (100.4F), Mild Pain (1 - 3)          Vital Signs Last 24 Hrs  T(C): 36.6 (31 Aug 2019 10:40), Max: 37.3 (31 Aug 2019 05:35)  T(F): 97.9 (31 Aug 2019 10:40), Max: 99.2 (31 Aug 2019 05:35)  HR: 70 (31 Aug 2019 10:40) (67 - 84)  BP: 151/85 (31 Aug 2019 10:40) (151/85 - 170/80)  BP(mean): --  RR: 16 (31 Aug 2019 10:40) (15 - 18)  SpO2: 98% (31 Aug 2019 10:40) (94% - 99%)    REVIEW OF SYSTEMS:  CONSTITUTIONAL: fatigue, weak  RESPIRATORY: cough, short of breath   CARDIOVASCULAR: No chest pain or chest pressure.  No palpitations, dizziness, light headedness, syncope or near syncope.  No edema, no orthopnea.         PHYSICAL EXAM  Constitutional:  elderly man, lethargic   HEENT: normocephalic, atraumatic.  PERRLA. EOMI  Neck : No JVD. no carotid bruits  Lungs:  decreased breath sounds bilateral   Heart:  S1 and S2. No S3, S4. II/VI systolic murmur.  Abdomen:  soft, non tender.  Extremities: No clubbing, cyanoisis or edema  Nuerologic:  A+O x 3. No focal deficits  Skin:  no rashes        LABS:                        10.4   10.54 )-----------( 426      ( 31 Aug 2019 05:40 )             31.6     08-31    143  |  102  |  17  ----------------------------<  169<H>  3.7   |  34<H>  |  0.59    Ca    8.8      31 Aug 2019 05:40  Mg     2.0     08-31    TPro  6.8  /  Alb  2.6<L>  /  TBili  0.4  /  DBili  x   /  AST  32  /  ALT  15  /  AlkPhos  89  08-29    CARDIAC MARKERS ( 30 Aug 2019 08:38 )  .035 ng/mL / x     / x     / x     / x      CARDIAC MARKERS ( 30 Aug 2019 01:20 )  .035 ng/mL / x     / x     / x     / x      CARDIAC MARKERS ( 29 Aug 2019 21:20 )  .027 ng/mL / x     / x     / x     / x          CARDIAC MARKERS ( 30 Aug 2019 08:38 )  .035 ng/mL / x     / x     / x     / x      CARDIAC MARKERS ( 30 Aug 2019 01:20 )  .035 ng/mL / x     / x     / x     / x      CARDIAC MARKERS ( 29 Aug 2019 21:20 )  .027 ng/mL / x     / x     / x     / x          acetaminophen   Tablet .. 650 milliGRAM(s) Oral every 6 hours PRN  baclofen 20 milliGRAM(s) Oral <User Schedule>  collagenase Ointment 1 Application(s) Topical daily  diazepam    Tablet 2 milliGRAM(s) Oral two times a day  enoxaparin Injectable 40 milliGRAM(s) SubCutaneous daily  minocycline 100 milliGRAM(s) Oral daily    I&O's Summary    30 Aug 2019 07:01  -  31 Aug 2019 07:00  --------------------------------------------------------  IN: 680 mL / OUT: 1200 mL / NET: -520 mL    31 Aug 2019 07:01  -  31 Aug 2019 12:18  --------------------------------------------------------  IN: 240 mL / OUT: 0 mL / NET: 240 mL    < from: 12 Lead ECG (08.29.19 @ 21:24) >  Sinus rhythm with frequent premature ventricular complexes  Left anterior fascicular block    Abnormal ECG  When compared with ECG of 26-JAN-2018 10:38,  premature ventricular complexes are now present  Left anterior fascicular block is now present    < end of copied text >    < from: TTE Echo Complete w/Doppler (08.30.19 @ 07:53) >   1. Left ventricular ejection fraction, by visual estimation, is 48%.   2. Mildly decreased global left ventricular systolic function.   3. Spectral Doppler shows restrictive pattern of left ventricular   myocardial filling (Grade III diastolic dysfunction).   4.Mild thickening and calcification of the anterior and posterior   mitral valve leaflets.   5. Moderate-severe tricuspid regurgitation.   6. Mild aortic regurgitation.   7. Moderate pulmonic valve regurgitation.   8. Dilatation of the aortic root.   9. Estimated pulmonary artery systolic pressure is 95.0 mmHg assuming a   right atrial pressure of 15 mmHg, which is consistent with severe   pulmonary hypertension.  10. Pulmonary hypertension is present.  11. LA volume Index is 51.5 ml/m² ml/m2.    < end of copied text >

## 2019-08-31 NOTE — PROGRESS NOTE ADULT - ASSESSMENT
79 year old man with paraplegia admitted with dyspnea.   Initially hypoxic.  CT chest does not demonstrate PEs... there are small pleural effusions, mild pulmonary edema and no parenchymal disease.  Echo demonstrates mildly decreased LV function, diastolic dysfunction, valvular heart disease with moderate-severe TR and severe pulmonary HTN.  Troponin not elevated.   Lethargic.. possibly due to valium   Multiple sacral decubitus ulcers    Plan  - diuresis  - consider ACE-I and long acting beta blocker  - antibiotics  - minimize anxiolytics  - overall guarded prognosis    discussed with patient's wife at bedside and with Medicine team

## 2019-08-31 NOTE — PROGRESS NOTE ADULT - SUBJECTIVE AND OBJECTIVE BOX
Patient is a 79y old  Male who presents with a chief complaint of dyspnea (31 Aug 2019 12:18)      Patient seen and examined at bedside.     Interval event: Pt c/o SOB last night and IV lasix 40mg was given. Pt was found to have AMS and lethargy today. Pt was awake, alert and able to have a conversation yesterday and today, pt is very lethargic and requires sternal rub to wake him up. Pt only nod and whisper to simple questions today.     ALLERGIES:  Bactrim (Rash)  Cipro (Unknown)  Levaquin (Unknown)  penicillin (Rash)  sulfa drugs (Unknown)    MEDICATIONS  (STANDING):  baclofen 20 milliGRAM(s) Oral <User Schedule>  collagenase Ointment 1 Application(s) Topical daily  diazepam    Tablet 2 milliGRAM(s) Oral two times a day  enoxaparin Injectable 40 milliGRAM(s) SubCutaneous daily  lisinopril 5 milliGRAM(s) Oral daily  metoprolol succinate ER 25 milliGRAM(s) Oral daily  minocycline 100 milliGRAM(s) Oral daily  vancomycin  IVPB 1000 milliGRAM(s) IV Intermittent once  vancomycin  IVPB        MEDICATIONS  (PRN):  acetaminophen   Tablet .. 650 milliGRAM(s) Oral every 6 hours PRN Temp greater or equal to 38C (100.4F), Mild Pain (1 - 3)    Vital Signs Last 24 Hrs  T(F): 97.9 (31 Aug 2019 10:40), Max: 99.2 (31 Aug 2019 05:35)  HR: 70 (31 Aug 2019 10:40) (67 - 84)  BP: 151/85 (31 Aug 2019 10:40) (151/85 - 170/80)  RR: 16 (31 Aug 2019 10:40) (15 - 18)  SpO2: 98% (31 Aug 2019 10:40) (94% - 99%)  I&O's Summary    30 Aug 2019 07:  -  31 Aug 2019 07:00  --------------------------------------------------------  IN: 680 mL / OUT: 1200 mL / NET: -520 mL    31 Aug 2019 07:01  -  31 Aug 2019 13:56  --------------------------------------------------------  IN: 240 mL / OUT: 300 mL / NET: -60 mL      GENERAL: frail, lethargic. opens eyes to sternal rubs. able to whisper yes or no to simple questions.   Oral: mucous membrane dry  HEAD:  Atraumatic, Normocephalic  NECK: Supple  CHEST/LUNG: Clear to auscultation bilaterally; No wheeze  HEART: Regular rate and rhythm; No murmurs  ABDOMEN: Soft, Nontender, Nondistended; Bowel sounds present  EXTREMITIES: no edema  NEUROLOGY: pupil equal and reactive to light. moving b/l upper extremities. chronic paraplegic  SKIN: No rashes or lesions    LABS:                        10.4   10.54 )-----------( 426      ( 31 Aug 2019 05:40 )             31.6         143  |  102  |  17  ----------------------------<  169  3.7   |  34  |  0.59    Ca    8.8      31 Aug 2019 05:40  Mg     2.0         TPro  6.8  /  Alb  2.6  /  TBili  0.4  /  DBili  x   /  AST  32  /  ALT  15  /  AlkPhos  89        eGFR if Non African American: 95 mL/min/1.73M2 (19 @ 05:40)  eGFR if African American: 110 mL/min/1.73M2 (19 @ 05:40)    PT/INR - ( 29 Aug 2019 21:20 )   PT: 13.2 sec;   INR: 1.17 ratio        PTT - ( 29 Aug 2019 21:20 )  PTT:31.6 sec  Lactate, Blood: 1.8 mmol/L ( @ 21:20)    Procalcitonin, Serum: 0.18 ng/mL (19 @ 21:20)    CARDIAC MARKERS ( 30 Aug 2019 08:38 )  .035 ng/mL / x     / x     / x     / x      CARDIAC MARKERS ( 30 Aug 2019 01:20 )  .035 ng/mL / x     / x     / x     / x      CARDIAC MARKERS ( 29 Aug 2019 21:20 )  .027 ng/mL / x     / x     / x     / x          TSH 5.32   TSH with FT4 reflex --  Total T3 --    ABG - ( 31 Aug 2019 10:35 )  pH, Arterial: 7.45  pH, Blood: x     /  pCO2: 49    /  pO2: 76    / HCO3: x     / Base Excess: x     /  SaO2: 95        POCT Blood Glucose.: 141 mg/dL (31 Aug 2019 12:15)  POCT Blood Glucose.: 154 mg/dL (31 Aug 2019 05:19)    Urinalysis Basic - ( 29 Aug 2019 23:00 )    Color: Yellow / Appearance: Clear / S.015 / pH: x  Gluc: x / Ketone: Negative  / Bili: Negative / Urobili: Negative   Blood: x / Protein: 30 mg/dL / Nitrite: Negative   Leuk Esterase: Negative / RBC: 0-4 /HPF / WBC 0-2 /HPF   Sq Epi: x / Non Sq Epi: Neg.-Few / Bacteria: x      Culture - Urine (collected 29 Aug 2019 23:00)  Source: .Urine Clean Catch (Midstream)  Final Report (31 Aug 2019 09:11):    >=3 organisms. Probable collection contamination.    Culture - Blood (collected 29 Aug 2019 21:20)  Source: .Blood Blood-Peripheral  Gram Stain (31 Aug 2019 12:41):    Growth in aerobic bottle: Gram Positive Cocci in Clusters  Preliminary Report (31 Aug 2019 12:41):    Growth in aerobic bottle: Gram Positive Cocci in Clusters    "Due to technical problems, Proteus sp. will Not be reported as part of    the BCID panel until further notice"    ***Blood Panel PCR results on this specimen are available    approximately 3 hours after the Gram stain result.***    Gram stain, PCR, and/or culture results may not always    correspond due to difference in methodologies.    ************************************************************    This PCR assay was performed using [employer].    The following targets are tested for: Enterococcus,    vancomycin resistant enterococci, Listeria monocytogenes,    coagulase negative staphylococci, S. aureus,    methicillin resistant S. aureus, Streptococcus agalactiae    (Group B), S. pneumoniae, S.pyogenes (Group A),    Acinetobacter baumannii, Enterobacter cloacae, E. coli,    Klebsiella oxytoca, K. pneumoniae, Proteus sp.,    Serratia marcescens, Haemophilus influenzae,    Neisseria meningitidis, Pseudomonas aeruginosa, Candida    albicans, C. glabrata, C krusei, C parapsilosis,    C. tropicalis and the KPC resistance gene.    Culture - Blood (collected 29 Aug 2019 21:20)  Source: .Blood Blood-Peripheral  Preliminary Report (31 Aug 2019 09:00):    No growth to date.      RADIOLOGY & ADDITIONAL TESTS:    Care Discussed with Consultants/Other Providers: Dr. Proctor and Dr. Barlow
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
## 2019-08-31 NOTE — CONSULT NOTE ADULT - ASSESSMENT
78 yo M, paraplegic, bedbound, colostomy, decubiti, R thigh sinus tract (?to hardware, on suppressive Jhoan), here with dyspnea, progressive lethargy.  Afebrile, WBC minimally elevated, hemody stable.  Mult pressure wounds, but no cellulitis; Plastics input noted.  No pyuria, no pneumonia.  Difficult to implicate sepsis, despite pt's high risk.  +Bld Cx, single bottle reflects contamination- lab now confirms CoNS via PCR.  No support for S aureus.    Plan:  Continue Minocycline  D/c Vanco  Wound care as outlined  Prognosis guarded, pt quite debilitated as carmelita HENRY/w wife
78 y/o female with history of paraplegia due to spinal cord injury, + colostomy, multiple sacral/hip/back pressure wounds, Grade 3 diastolic heart dysfunction admitted to the hospital for dyspnea. Noted to be hypoxic and is now on oxygen. CT scan of the chest showing bilateral small pleural effusions, some pulmonary edema. No Pulmonary emboli reported on CT scan. Noted on have elevated serum bicarbonate level, unclear etiology ?chornic CO2 retention with renal compensation. Echocardiogram showing diastolic heart dysfunction, with valvular heart disease with aortic/mitral regurgitation. Also with elevated right sided pressures. Likely pulmonary hypertension related to left heart disease as no underlying parenchymal lung disease noted, no pulmonary emboli noted.     Assessment:  1. Acute hypoxia   2. Pulmonary edema  3. 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 Grade 3 diastolic heart dysfunction   5. Paraplegia   6. Sacral decubiti     - Cont. diuresis to aim for negative balance   - Check ABG   - Oxygen support to maintain saturation >90%  - ?Hypoventilation given elevated serum bicarb   - May need PFTs and possible NIV support  - Should get sleep study as outpatient   - Cont. antibiotics for pressure injury, plastic surgery consult  - DVT prophylaxis
Multiple pressure ulcers as outlined on admission Ulcer Form.  All can be managed with appropriate size hydrocellular dressing (mepilex or allyvin) adding packing to right trochanter wound and santyl to right back wound change every other day and as needed.
Pulmonary hypertension. cough  Small effusions.  Possible diastolic chf      Suggest diuretic prn  pulmonary evaluation.  Consider w/u for restrictive cardiomyopathy ( amyloid)

## 2019-08-31 NOTE — PROVIDER CONTACT NOTE (CRITICAL VALUE NOTIFICATION) - SITUATION
Patient lethargic, received lab report that is growth in aerobic bottle: gram positive cocci in clusters
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
## 2019-09-01 ENCOUNTER — TRANSCRIPTION ENCOUNTER (OUTPATIENT)
Age: 79
End: 2019-09-01

## 2019-09-01 VITALS
DIASTOLIC BLOOD PRESSURE: 57 MMHG | HEART RATE: 59 BPM | OXYGEN SATURATION: 97 % | SYSTOLIC BLOOD PRESSURE: 141 MMHG | RESPIRATION RATE: 16 BRPM | TEMPERATURE: 98 F

## 2019-09-01 LAB
ANION GAP SERPL CALC-SCNC: 2 MMOL/L — LOW (ref 5–17)
BASOPHILS # BLD AUTO: 0.02 K/UL — SIGNIFICANT CHANGE UP (ref 0–0.2)
BASOPHILS NFR BLD AUTO: 0.3 % — SIGNIFICANT CHANGE UP (ref 0–2)
BUN SERPL-MCNC: 24 MG/DL — HIGH (ref 7–23)
CALCIUM SERPL-MCNC: 9 MG/DL — SIGNIFICANT CHANGE UP (ref 8.4–10.5)
CHLORIDE SERPL-SCNC: 101 MMOL/L — SIGNIFICANT CHANGE UP (ref 96–108)
CO2 SERPL-SCNC: 38 MMOL/L — HIGH (ref 22–31)
CREAT SERPL-MCNC: 0.48 MG/DL — LOW (ref 0.5–1.3)
CULTURE RESULTS: SIGNIFICANT CHANGE UP
EOSINOPHIL # BLD AUTO: 0.49 K/UL — SIGNIFICANT CHANGE UP (ref 0–0.5)
EOSINOPHIL NFR BLD AUTO: 6.6 % — HIGH (ref 0–6)
GLUCOSE BLDC GLUCOMTR-MCNC: 123 MG/DL — HIGH (ref 70–99)
GLUCOSE BLDC GLUCOMTR-MCNC: 133 MG/DL — HIGH (ref 70–99)
GLUCOSE SERPL-MCNC: 140 MG/DL — HIGH (ref 70–99)
HBA1C BLD-MCNC: 6.4 % — HIGH (ref 4–5.6)
HCT VFR BLD CALC: 33 % — LOW (ref 39–50)
HGB BLD-MCNC: 10.4 G/DL — LOW (ref 13–17)
IMM GRANULOCYTES NFR BLD AUTO: 0.5 % — SIGNIFICANT CHANGE UP (ref 0–1.5)
LYMPHOCYTES # BLD AUTO: 0.58 K/UL — LOW (ref 1–3.3)
LYMPHOCYTES # BLD AUTO: 7.8 % — LOW (ref 13–44)
MAGNESIUM SERPL-MCNC: 2 MG/DL — SIGNIFICANT CHANGE UP (ref 1.6–2.6)
MCHC RBC-ENTMCNC: 27.7 PG — SIGNIFICANT CHANGE UP (ref 27–34)
MCHC RBC-ENTMCNC: 31.5 GM/DL — LOW (ref 32–36)
MCV RBC AUTO: 88 FL — SIGNIFICANT CHANGE UP (ref 80–100)
MONOCYTES # BLD AUTO: 0.66 K/UL — SIGNIFICANT CHANGE UP (ref 0–0.9)
MONOCYTES NFR BLD AUTO: 8.9 % — SIGNIFICANT CHANGE UP (ref 2–14)
NEUTROPHILS # BLD AUTO: 5.62 K/UL — SIGNIFICANT CHANGE UP (ref 1.8–7.4)
NEUTROPHILS NFR BLD AUTO: 75.9 % — SIGNIFICANT CHANGE UP (ref 43–77)
NRBC # BLD: 0 /100 WBCS — SIGNIFICANT CHANGE UP (ref 0–0)
ORGANISM # SPEC MICROSCOPIC CNT: SIGNIFICANT CHANGE UP
ORGANISM # SPEC MICROSCOPIC CNT: SIGNIFICANT CHANGE UP
PLATELET # BLD AUTO: 399 K/UL — SIGNIFICANT CHANGE UP (ref 150–400)
POTASSIUM SERPL-MCNC: 3.3 MMOL/L — LOW (ref 3.5–5.3)
POTASSIUM SERPL-SCNC: 3.3 MMOL/L — LOW (ref 3.5–5.3)
RBC # BLD: 3.75 M/UL — LOW (ref 4.2–5.8)
RBC # FLD: 14.7 % — HIGH (ref 10.3–14.5)
SODIUM SERPL-SCNC: 141 MMOL/L — SIGNIFICANT CHANGE UP (ref 135–145)
SPECIMEN SOURCE: SIGNIFICANT CHANGE UP
WBC # BLD: 7.41 K/UL — SIGNIFICANT CHANGE UP (ref 3.8–10.5)
WBC # FLD AUTO: 7.41 K/UL — SIGNIFICANT CHANGE UP (ref 3.8–10.5)

## 2019-09-01 PROCEDURE — 85610 PROTHROMBIN TIME: CPT

## 2019-09-01 PROCEDURE — 83880 ASSAY OF NATRIURETIC PEPTIDE: CPT

## 2019-09-01 PROCEDURE — 99232 SBSQ HOSP IP/OBS MODERATE 35: CPT

## 2019-09-01 PROCEDURE — 71275 CT ANGIOGRAPHY CHEST: CPT

## 2019-09-01 PROCEDURE — 36415 COLL VENOUS BLD VENIPUNCTURE: CPT

## 2019-09-01 PROCEDURE — 99285 EMERGENCY DEPT VISIT HI MDM: CPT | Mod: 25

## 2019-09-01 PROCEDURE — 83036 HEMOGLOBIN GLYCOSYLATED A1C: CPT

## 2019-09-01 PROCEDURE — 87040 BLOOD CULTURE FOR BACTERIA: CPT

## 2019-09-01 PROCEDURE — 81001 URINALYSIS AUTO W/SCOPE: CPT

## 2019-09-01 PROCEDURE — 70450 CT HEAD/BRAIN W/O DYE: CPT

## 2019-09-01 PROCEDURE — 87086 URINE CULTURE/COLONY COUNT: CPT

## 2019-09-01 PROCEDURE — 99239 HOSP IP/OBS DSCHRG MGMT >30: CPT

## 2019-09-01 PROCEDURE — 84436 ASSAY OF TOTAL THYROXINE: CPT

## 2019-09-01 PROCEDURE — 71045 X-RAY EXAM CHEST 1 VIEW: CPT | Mod: 26

## 2019-09-01 PROCEDURE — 82962 GLUCOSE BLOOD TEST: CPT

## 2019-09-01 PROCEDURE — 96365 THER/PROPH/DIAG IV INF INIT: CPT

## 2019-09-01 PROCEDURE — 93306 TTE W/DOPPLER COMPLETE: CPT

## 2019-09-01 PROCEDURE — 84484 ASSAY OF TROPONIN QUANT: CPT

## 2019-09-01 PROCEDURE — 84145 PROCALCITONIN (PCT): CPT

## 2019-09-01 PROCEDURE — 84443 ASSAY THYROID STIM HORMONE: CPT

## 2019-09-01 PROCEDURE — 80048 BASIC METABOLIC PNL TOTAL CA: CPT

## 2019-09-01 PROCEDURE — 83605 ASSAY OF LACTIC ACID: CPT

## 2019-09-01 PROCEDURE — 71045 X-RAY EXAM CHEST 1 VIEW: CPT

## 2019-09-01 PROCEDURE — 80053 COMPREHEN METABOLIC PANEL: CPT

## 2019-09-01 PROCEDURE — 85027 COMPLETE CBC AUTOMATED: CPT

## 2019-09-01 PROCEDURE — 83735 ASSAY OF MAGNESIUM: CPT

## 2019-09-01 PROCEDURE — 74176 CT ABD & PELVIS W/O CONTRAST: CPT

## 2019-09-01 PROCEDURE — 93005 ELECTROCARDIOGRAM TRACING: CPT

## 2019-09-01 PROCEDURE — 93308 TTE F-UP OR LMTD: CPT

## 2019-09-01 PROCEDURE — 87150 DNA/RNA AMPLIFIED PROBE: CPT

## 2019-09-01 PROCEDURE — 85730 THROMBOPLASTIN TIME PARTIAL: CPT

## 2019-09-01 PROCEDURE — 82803 BLOOD GASES ANY COMBINATION: CPT

## 2019-09-01 RX ORDER — POTASSIUM CHLORIDE 20 MEQ
40 PACKET (EA) ORAL ONCE
Refills: 0 | Status: COMPLETED | OUTPATIENT
Start: 2019-09-01 | End: 2019-09-01

## 2019-09-01 RX ORDER — FUROSEMIDE 40 MG
1 TABLET ORAL
Qty: 30 | Refills: 0
Start: 2019-09-01 | End: 2019-09-30

## 2019-09-01 RX ORDER — METOPROLOL TARTRATE 50 MG
1 TABLET ORAL
Qty: 30 | Refills: 0
Start: 2019-09-01 | End: 2019-09-30

## 2019-09-01 RX ORDER — LISINOPRIL 2.5 MG/1
1 TABLET ORAL
Qty: 30 | Refills: 0
Start: 2019-09-01 | End: 2019-09-30

## 2019-09-01 RX ADMIN — Medication 2 MILLIGRAM(S): at 08:51

## 2019-09-01 RX ADMIN — LISINOPRIL 5 MILLIGRAM(S): 2.5 TABLET ORAL at 05:51

## 2019-09-01 RX ADMIN — Medication 40 MILLIEQUIVALENT(S): at 14:32

## 2019-09-01 RX ADMIN — MINOCYCLINE HYDROCHLORIDE 100 MILLIGRAM(S): 45 TABLET, EXTENDED RELEASE ORAL at 11:47

## 2019-09-01 RX ADMIN — Medication 1 APPLICATION(S): at 11:48

## 2019-09-01 RX ADMIN — ENOXAPARIN SODIUM 40 MILLIGRAM(S): 100 INJECTION SUBCUTANEOUS at 11:47

## 2019-09-01 RX ADMIN — Medication 20 MILLIGRAM(S): at 05:56

## 2019-09-01 NOTE — DISCHARGE NOTE PROVIDER - REASON FOR ADMISSION
dyspnea regular rate and rhythm regular rate and rhythm regular rate and rhythm regular rate and rhythm regular rate and rhythm regular rate and rhythm regular rate and rhythm regular rate and rhythm regular rate and rhythm regular rate and rhythm regular rate and rhythm - - -

## 2019-09-01 NOTE — PROGRESS NOTE ADULT - SUBJECTIVE AND OBJECTIVE BOX
Patient is a 79y old  Male who presents with a chief complaint of dyspnea (01 Sep 2019 11:32)      Patient seen and examined at bedside. No overnight events reported. Lethargy resolved. Pt is back to his chronic debilitated state and pt's mental status is back to baseline. Pt's wife and aide agrees that mental state is back to baseline. Pt denies any SOB and CP.    ALLERGIES:  Bactrim (Rash)  Cipro (Unknown)  Levaquin (Unknown)  penicillin (Rash)  sulfa drugs (Unknown)    MEDICATIONS  (STANDING):  baclofen 20 milliGRAM(s) Oral <User Schedule>  collagenase Ointment 1 Application(s) Topical daily  diazepam    Tablet 2 milliGRAM(s) Oral two times a day  enoxaparin Injectable 40 milliGRAM(s) SubCutaneous daily  lisinopril 5 milliGRAM(s) Oral daily  metoprolol succinate ER 25 milliGRAM(s) Oral daily  minocycline 100 milliGRAM(s) Oral daily    MEDICATIONS  (PRN):  acetaminophen   Tablet .. 650 milliGRAM(s) Oral every 6 hours PRN Temp greater or equal to 38C (100.4F), Mild Pain (1 - 3)    Vital Signs Last 24 Hrs  T(F): 97.7 (01 Sep 2019 08:27), Max: 98.9 (31 Aug 2019 14:00)  HR: 59 (01 Sep 2019 08:27) (57 - 72)  BP: 141/57 (01 Sep 2019 08:27) (104/60 - 158/84)  RR: 16 (01 Sep 2019 08:27) (15 - 20)  SpO2: 97% (01 Sep 2019 08:27) (95% - 97%)  I&O's Summary    31 Aug 2019 07:01  -  01 Sep 2019 07:00  --------------------------------------------------------  IN: 240 mL / OUT: 400 mL / NET: -160 mL          GENERAL: NAD, frail. awake alert and answering questions appropriately  HEAD:  Atraumatic, Normocephalic  EYES: EOMI, PERRLA, conjunctiva and sclera clear  NECK: Supple  CHEST/LUNG: Clear to auscultation bilaterally; No wheeze  HEART: Regular rate and rhythm; No murmurs, rubs, or gallops  ABDOMEN: Soft, Nontender, Nondistended; Bowel sounds present  EXTREMITIES:  No edema  NEUROLOGY: non-focal      LABS:                        10.4   7.41  )-----------( 399      ( 01 Sep 2019 05:45 )             33.0         141  |  101  |  24  ----------------------------<  140  3.3   |  38  |  0.48    Ca    9.0      01 Sep 2019 05:45  Mg     2.0         TPro  6.8  /  Alb  2.6  /  TBili  0.4  /  DBili  x   /  AST  32  /  ALT  15  /  AlkPhos  89          eGFR if Non African American: 103 mL/min/1.73M2 (19 @ 05:45)  eGFR if African American: 120 mL/min/1.73M2 (19 @ 05:45)    PT/INR - ( 29 Aug 2019 21:20 )   PT: 13.2 sec;   INR: 1.17 ratio         PTT - ( 29 Aug 2019 21:20 )  PTT:31.6 sec  Lactate, Blood: 1.8 mmol/L ( @ 21:20)    Procalcitonin, Serum: 0.18 ng/mL (19 @ 21:20)    CARDIAC MARKERS ( 31 Aug 2019 05:40 )  .034 ng/mL / x     / x     / x     / x      CARDIAC MARKERS ( 30 Aug 2019 08:38 )  .035 ng/mL / x     / x     / x     / x      CARDIAC MARKERS ( 30 Aug 2019 01:20 )  .035 ng/mL / x     / x     / x     / x      CARDIAC MARKERS ( 29 Aug 2019 21:20 )  .027 ng/mL / x     / x     / x     / x            TSH 5.32   TSH with FT4 reflex --  Total T3 --      ABG - ( 31 Aug 2019 10:35 )  pH, Arterial: 7.45  pH, Blood: x     /  pCO2: 49    /  pO2: 76    / HCO3: x     / Base Excess: x     /  SaO2: 95        POCT Blood Glucose.: 133 mg/dL (01 Sep 2019 11:42)  POCT Blood Glucose.: 123 mg/dL (01 Sep 2019 07:59)  POCT Blood Glucose.: 158 mg/dL (31 Aug 2019 22:02)  POCT Blood Glucose.: 199 mg/dL (31 Aug 2019 17:08)     ZwfeznpneeR3N 6.4   TknucrreytL4B 6.2    Urinalysis Basic - ( 29 Aug 2019 23:00 )    Color: Yellow / Appearance: Clear / S.015 / pH: x  Gluc: x / Ketone: Negative  / Bili: Negative / Urobili: Negative   Blood: x / Protein: 30 mg/dL / Nitrite: Negative   Leuk Esterase: Negative / RBC: 0-4 /HPF / WBC 0-2 /HPF   Sq Epi: x / Non Sq Epi: Neg.-Few / Bacteria: x        Culture - Urine (collected 29 Aug 2019 23:00)  Source: .Urine Clean Catch (Midstream)  Final Report (31 Aug 2019 09:11):    >=3 organisms. Probable collection contamination.    Culture - Blood (collected 29 Aug 2019 21:20)  Source: .Blood Blood-Peripheral  Gram Stain (31 Aug 2019 12:41):    Growth in aerobic bottle: Gram Positive Cocci in Clusters  Final Report (01 Sep 2019 12:39):    Growth in aerobic bottle: Coag Negative Staphylococcus    Single set isolate, possible contaminant. Contact    Microbiology if susceptibility testing clinically    indicated.    "Due to technical problems, Proteus sp. will Not be reported as part of    the BCID panel until further notice"    ***Blood Panel PCR results on this specimen are available    approximately 3 hours after the Gram stain result.***    Gram stain, PCR, and/or culture results may not always    correspond due to difference in methodologies.    ************************************************************    This PCR assay was performed using [employer].    The following targets are tested for: Enterococcus,    vancomycin resistant enterococci, Listeria monocytogenes,    coagulase negative staphylococci, S. aureus,    methicillin resistant S. aureus, Streptococcus agalactiae    (Group B), S. pneumoniae, S. pyogenes (Group A),    Acinetobacter baumannii, Enterobacter cloacae, E. coli,    Klebsiella oxytoca, K. pneumoniae, Proteus sp.,    Serratia marcescens, Haemophilus influenzae,    Neisseria meningitidis, Pseudomonas aeruginosa, Candida    albicans, C. glabrata, C krusei, C parapsilosis,    C. tropicalis and the KPC resistance gene.  Organism: Blood Culture PCR (01 Sep 2019 12:39)  Organism: Blood Culture PCR (01 Sep 2019 12:39)      -  Coagulase negative Staphylococcus: Detec      Method Type: PCR    Culture - Blood (collected 29 Aug 2019 21:20)  Source: .Blood Blood-Peripheral  Preliminary Report (31 Aug 2019 09:00):    No growth to date.      RADIOLOGY & ADDITIONAL TESTS:    Care Discussed with Consultants/Other Providers: Patient is a 79y old  Male who presents with a chief complaint of dyspnea (01 Sep 2019 11:32)      Patient seen and examined at bedside. No overnight events reported. Lethargy resolved. Pt is back to his chronic debilitated state and pt's mental status is back to baseline. Pt's wife and aide agrees that mental state is back to baseline. Pt denies any SOB and CP.    ALLERGIES:  Bactrim (Rash)  Cipro (Unknown)  Levaquin (Unknown)  penicillin (Rash)  sulfa drugs (Unknown)    MEDICATIONS  (STANDING):  baclofen 20 milliGRAM(s) Oral <User Schedule>  collagenase Ointment 1 Application(s) Topical daily  diazepam    Tablet 2 milliGRAM(s) Oral two times a day  enoxaparin Injectable 40 milliGRAM(s) SubCutaneous daily  lisinopril 5 milliGRAM(s) Oral daily  metoprolol succinate ER 25 milliGRAM(s) Oral daily  minocycline 100 milliGRAM(s) Oral daily    MEDICATIONS  (PRN):  acetaminophen   Tablet .. 650 milliGRAM(s) Oral every 6 hours PRN Temp greater or equal to 38C (100.4F), Mild Pain (1 - 3)    Vital Signs Last 24 Hrs  T(F): 97.7 (01 Sep 2019 08:27), Max: 98.9 (31 Aug 2019 14:00)  HR: 59 (01 Sep 2019 08:27) (57 - 72)  BP: 141/57 (01 Sep 2019 08:27) (104/60 - 158/84)  RR: 16 (01 Sep 2019 08:27) (15 - 20)  SpO2: 97% (01 Sep 2019 08:27) (95% - 97%)  I&O's Summary    31 Aug 2019 07:01  -  01 Sep 2019 07:00  --------------------------------------------------------  IN: 240 mL / OUT: 400 mL / NET: -160 mL          GENERAL: NAD, frail. awake alert and answering questions appropriately  HEAD:  Atraumatic, Normocephalic  EYES: EOMI, PERRLA, conjunctiva and sclera clear  NECK: Supple  CHEST/LUNG: Clear to auscultation bilaterally; No wheeze  HEART: Regular rate and rhythm; No murmurs, rubs, or gallops  ABDOMEN: Soft, Nontender, Nondistended; Bowel sounds present  EXTREMITIES:  No edema  NEUROLOGY: non-focal  SKIN: mild facial erythema (however, pt reports that his face always gets red in the hospital and there is no other rash)      LABS:                        10.4   7.41  )-----------( 399      ( 01 Sep 2019 05:45 )             33.0         141  |  101  |  24  ----------------------------<  140  3.3   |  38  |  0.48    Ca    9.0      01 Sep 2019 05:45  Mg     2.0         TPro  6.8  /  Alb  2.6  /  TBili  0.4  /  DBili  x   /  AST  32  /  ALT  15  /  AlkPhos  89          eGFR if Non African American: 103 mL/min/1.73M2 (19 @ 05:45)  eGFR if African American: 120 mL/min/1.73M2 (19 @ 05:45)    PT/INR - ( 29 Aug 2019 21:20 )   PT: 13.2 sec;   INR: 1.17 ratio         PTT - ( 29 Aug 2019 21:20 )  PTT:31.6 sec  Lactate, Blood: 1.8 mmol/L ( @ 21:20)    Procalcitonin, Serum: 0.18 ng/mL (19 @ 21:20)    CARDIAC MARKERS ( 31 Aug 2019 05:40 )  .034 ng/mL / x     / x     / x     / x      CARDIAC MARKERS ( 30 Aug 2019 08:38 )  .035 ng/mL / x     / x     / x     / x      CARDIAC MARKERS ( 30 Aug 2019 01:20 )  .035 ng/mL / x     / x     / x     / x      CARDIAC MARKERS ( 29 Aug 2019 21:20 )  .027 ng/mL / x     / x     / x     / x            TSH 5.32   TSH with FT4 reflex --  Total T3 --      ABG - ( 31 Aug 2019 10:35 )  pH, Arterial: 7.45  pH, Blood: x     /  pCO2: 49    /  pO2: 76    / HCO3: x     / Base Excess: x     /  SaO2: 95        POCT Blood Glucose.: 133 mg/dL (01 Sep 2019 11:42)  POCT Blood Glucose.: 123 mg/dL (01 Sep 2019 07:59)  POCT Blood Glucose.: 158 mg/dL (31 Aug 2019 22:02)  POCT Blood Glucose.: 199 mg/dL (31 Aug 2019 17:08)     DyegmruuudN7L 6.4   ThvbmfoebtW1W 6.2    Urinalysis Basic - ( 29 Aug 2019 23:00 )    Color: Yellow / Appearance: Clear / S.015 / pH: x  Gluc: x / Ketone: Negative  / Bili: Negative / Urobili: Negative   Blood: x / Protein: 30 mg/dL / Nitrite: Negative   Leuk Esterase: Negative / RBC: 0-4 /HPF / WBC 0-2 /HPF   Sq Epi: x / Non Sq Epi: Neg.-Few / Bacteria: x        Culture - Urine (collected 29 Aug 2019 23:00)  Source: .Urine Clean Catch (Midstream)  Final Report (31 Aug 2019 09:11):    >=3 organisms. Probable collection contamination.    Culture - Blood (collected 29 Aug 2019 21:20)  Source: .Blood Blood-Peripheral  Gram Stain (31 Aug 2019 12:41):    Growth in aerobic bottle: Gram Positive Cocci in Clusters  Final Report (01 Sep 2019 12:39):    Growth in aerobic bottle: Coag Negative Staphylococcus    Single set isolate, possible contaminant. Contact    Microbiology if susceptibility testing clinically    indicated.    "Due to technical problems, Proteus sp. will Not be reported as part of    the BCID panel until further notice"    ***Blood Panel PCR results on this specimen are available    approximately 3 hours after the Gram stain result.***    Gram stain, PCR, and/or culture results may not always    correspond due to difference in methodologies.    ************************************************************    This PCR assay was performed using [employer].    The following targets are tested for: Enterococcus,    vancomycin resistant enterococci, Listeria monocytogenes,    coagulase negative staphylococci, S. aureus,    methicillin resistant S. aureus, Streptococcus agalactiae    (Group B), S. pneumoniae, S. pyogenes (Group A),    Acinetobacter baumannii, Enterobacter cloacae, E. coli,    Klebsiella oxytoca, K. pneumoniae, Proteus sp.,    Serratia marcescens, Haemophilus influenzae,    Neisseria meningitidis, Pseudomonas aeruginosa, Candida    albicans, C. glabrata, C krusei, C parapsilosis,    C. tropicalis and the KPC resistance gene.  Organism: Blood Culture PCR (01 Sep 2019 12:39)  Organism: Blood Culture PCR (01 Sep 2019 12:39)      -  Coagulase negative Staphylococcus: Detec      Method Type: PCR    Culture - Blood (collected 29 Aug 2019 21:20)  Source: .Blood Blood-Peripheral  Preliminary Report (31 Aug 2019 09:00):    No growth to date.      RADIOLOGY & ADDITIONAL TESTS:    Care Discussed with Consultants/Other Providers:

## 2019-09-01 NOTE — PROGRESS NOTE ADULT - ASSESSMENT
Paraplegia, multiple pressure ulcers various stages  -per wife patient has followup at [facility] this coming Wednesday

## 2019-09-01 NOTE — DISCHARGE NOTE NURSING/CASE MANAGEMENT/SOCIAL WORK - PATIENT PORTAL LINK FT
You can access the FollowMyHealth Patient Portal offered by [facility] [place] by registering at the following website: [email or web address]. By joining [employer]’s FollowMyHealth portal, you will also be able to view your health information using other applications (apps) compatible with our system.

## 2019-09-01 NOTE — PROGRESS NOTE ADULT - ASSESSMENT
79 year old man with paraplegia admitted with dyspnea.   Initially hypoxic.  CT chest does not demonstrate PEs... there are small pleural effusions, mild pulmonary edema and no parenchymal disease.  Echo demonstrates mildly decreased LV function, diastolic dysfunction, valvular heart disease with moderate-severe TR and severe pulmonary HTN.  Lethargy has resolved  Multiple sacral decubitus ulcers  He appears to have returned to his baseline debilitated state    Plan  - continue diuresis  - continue ACE-I and long acting beta blocker  - antibiotics  - minimize anxiolytics  - overall guarded prognosis  - discharge planning in progress     discussed with patient's wife at bedside and with Medicine team             Electronic Signatures:  Jose Bojorquez)  (Signed 31-Aug-2019 12:32)  	Authored: Progress Note, Reason for Admission, Subjective and Objective, Assessment and Plan      Last Updated: 31-Aug-2019 12:32 by Jose Bojorquez)

## 2019-09-01 NOTE — DISCHARGE NOTE PROVIDER - NSDCCPCAREPLAN_GEN_ALL_CORE_FT
PRINCIPAL DISCHARGE DIAGNOSIS  Diagnosis: Acute on chronic diastolic heart failure  Assessment and Plan of Treatment:       SECONDARY DISCHARGE DIAGNOSES  Diagnosis: Pulmonary hypertension  Assessment and Plan of Treatment:
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
## 2019-09-01 NOTE — DISCHARGE NOTE PROVIDER - CARE PROVIDERS DIRECT ADDRESSES
[email or web address]
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
## 2019-09-01 NOTE — PROGRESS NOTE ADULT - SUBJECTIVE AND OBJECTIVE BOX
SUBJ:  Patient is a 79y old  Male who presents with a chief complaint of dyspnea (31 Aug 2019 19:27)  patient seen and examined  chart reviewed  he is more awake and alert... wife reports he is back to his baseline  no complaints of shortness of breath or cough       PAST MEDICAL & SURGICAL HISTORY:  Macular degeneration  Hard of hearing  Diabetes  Paraplegia  H/O rectal sphincterotomy  History of bilateral cataract extraction  H/O colostomy    Home Medications:  baclofen 20 mg oral tablet: 1 tab(s) orally 3 times a day (29 Aug 2019 22:36)  minocycline 100 mg oral tablet: orally once a day (30 Aug 2019 00:20)  Valium 2 mg oral tablet: 1 tab(s) orally 2 times a day (29 Aug 2019 22:36)    MEDICATIONS  (STANDING):  baclofen 20 milliGRAM(s) Oral <User Schedule>  collagenase Ointment 1 Application(s) Topical daily  diazepam    Tablet 2 milliGRAM(s) Oral two times a day  enoxaparin Injectable 40 milliGRAM(s) SubCutaneous daily  lisinopril 5 milliGRAM(s) Oral daily  metoprolol succinate ER 25 milliGRAM(s) Oral daily  minocycline 100 milliGRAM(s) Oral daily    MEDICATIONS  (PRN):  acetaminophen   Tablet .. 650 milliGRAM(s) Oral every 6 hours PRN Temp greater or equal to 38C (100.4F), Mild Pain (1 - 3)          Vital Signs Last 24 Hrs  T(C): 36.5 (01 Sep 2019 08:27), Max: 37.2 (31 Aug 2019 14:00)  T(F): 97.7 (01 Sep 2019 08:27), Max: 98.9 (31 Aug 2019 14:00)  HR: 59 (01 Sep 2019 08:27) (57 - 72)  BP: 141/57 (01 Sep 2019 08:27) (104/60 - 158/84)  BP(mean): --  RR: 16 (01 Sep 2019 08:27) (15 - 20)  SpO2: 97% (01 Sep 2019 08:27) (95% - 97%)    REVIEW OF SYSTEMS:  CONSTITUTIONAL: fatigue   RESPIRATORY: cough and shortness of breath   CARDIOVASCULAR: No chest pain or chest pressure.    No palpitations, dizziness, light headedness, syncope or near syncope.  No edema, no orthopnea.       PHYSICAL EXAM  Constitutional:  frail elderly man in NAD   HEENT: normocephalic, atraumatic.  PERRLA. EOMI  Neck : No JVD. no carotid bruits  Lungs: decreased breath sounds bilateral   Heart:  S1 and S2. No S3, S4. II/VI systolic murmur.  Abdomen:  soft, non tender.  Extremities: No clubbing, cyanoisis or edema  Nuerologic:  A+O x 3. No focal deficits          LABS:                        10.4   7.41  )-----------( 399      ( 01 Sep 2019 05:45 )             33.0     09-01    141  |  101  |  24<H>  ----------------------------<  140<H>  3.3<L>   |  38<H>  |  0.48<L>    Ca    9.0      01 Sep 2019 05:45  Mg     2.0     09-01      CARDIAC MARKERS ( 31 Aug 2019 05:40 )  .034 ng/mL / x     / x     / x     / x          CARDIAC MARKERS ( 31 Aug 2019 05:40 )  .034 ng/mL / x     / x     / x     / x          acetaminophen   Tablet .. 650 milliGRAM(s) Oral every 6 hours PRN  baclofen 20 milliGRAM(s) Oral <User Schedule>  collagenase Ointment 1 Application(s) Topical daily  diazepam    Tablet 2 milliGRAM(s) Oral two times a day  enoxaparin Injectable 40 milliGRAM(s) SubCutaneous daily  lisinopril 5 milliGRAM(s) Oral daily  metoprolol succinate ER 25 milliGRAM(s) Oral daily  minocycline 100 milliGRAM(s) Oral daily    I&O's Summary    31 Aug 2019 07:01  -  01 Sep 2019 07:00  --------------------------------------------------------  IN: 240 mL / OUT: 400 mL / NET: -160 mL    < from: TTE Echo Complete w/Doppler (08.30.19 @ 07:53) >  Summary:   1. Left ventricular ejection fraction, by visual estimation, is 48%.   2. Mildly decreased global left ventricular systolic function.   3. Spectral Doppler shows restrictive pattern of left ventricular   myocardial filling (Grade III diastolic dysfunction).   4.Mild thickening and calcification of the anterior and posterior   mitral valve leaflets.   5. Moderate-severe tricuspid regurgitation.   6. Mild aortic regurgitation.   7. Moderate pulmonic valve regurgitation.   8. Dilatation of the aortic root.   9. Estimated pulmonary artery systolic pressure is 95.0 mmHg assuming a   right atrial pressure of 15 mmHg, which is consistent with severe   pulmonary hypertension.  10. Pulmonary hypertension is present.  11. LA volume Index is 51.5 ml/m² ml/m2.    < end of copied text >  < from: TTE Echo Complete w/Doppler (08.30.19 @ 07:53) >      < end of copied text >

## 2019-09-01 NOTE — PROGRESS NOTE ADULT - SUBJECTIVE AND OBJECTIVE BOX
(x  ) No complaints (  ) Complains of:   Wife and aide at bedside, patient about to be picked for discharge  T(F): 97.7 (09-01-19 @ 08:27), Max: 98.4 (08-31-19 @ 20:58)  HR: 59 (09-01-19 @ 08:27) (57 - 68)  BP: 141/57 (09-01-19 @ 08:27) (104/60 - 141/57)  RR: 16 (09-01-19 @ 08:27) (15 - 16)  SpO2: 97% (09-01-19 @ 08:27) (95% - 97%)        Wound Location 1:  multiple (see consult), all wound with clean dressings per aide                         10.4   7.41  )-----------( 399      ( 01 Sep 2019 05:45 )             33.0     CBC Full  -  ( 01 Sep 2019 05:45 )  WBC Count : 7.41 K/uL  RBC Count : 3.75 M/uL  Hemoglobin : 10.4 g/dL  Hematocrit : 33.0 %  Platelet Count - Automated : 399 K/uL  Mean Cell Volume : 88.0 fl  Mean Cell Hemoglobin : 27.7 pg  Mean Cell Hemoglobin Concentration : 31.5 gm/dL  Auto Neutrophil # : 5.62 K/uL  Auto Lymphocyte # : 0.58 K/uL  Auto Monocyte # : 0.66 K/uL  Auto Eosinophil # : 0.49 K/uL  Auto Basophil # : 0.02 K/uL  Auto Neutrophil % : 75.9 %  Auto Lymphocyte % : 7.8 %  Auto Monocyte % : 8.9 %  Auto Eosinophil % : 6.6 %  Auto Basophil % : 0.3 %    141|101|24<140  3.3|38|0.48  9.0,2.0,--  09-01 @ 05:45        09-01 IjeeuzmaycV5R 6.4  08-31 LgdwrlotbyN1T 6.2            Procedure Performed:  (  )Yes     (  )No  Name of Procedure:  (  )debridement    (  )I&D    (  )Other:  (  )partial thickness     (  )full thickness     (  )subcutaneous     (  )muscle/tendon     (  )bone  (  )sharp     (  )surgical

## 2019-09-01 NOTE — DISCHARGE NOTE PROVIDER - NSDCFUSCHEDAPPT_GEN_ALL_CORE_FT
JERICA MAC ; 09/03/2019 ; NPP [facility] [place] JERICA MAC ; 09/03/2019 ; NPP Intmed [facility] [place]

## 2019-09-01 NOTE — PROGRESS NOTE ADULT - ASSESSMENT
80 y/o male with history of paraplegia due to spinal cord injury, + colostomy, multiple sacral/hip/back pressure wounds, Grade 3 diastolic heart dysfunction admitted to the hospital for dyspnea. Noted to be hypoxic and is now on oxygen. CT scan of the chest showing bilateral small pleural effusions, some pulmonary edema. No Pulmonary emboli reported on CT scan. Noted on have elevated serum bicarbonate level, unclear etiology ?chornic CO2 retention with renal compensation. Echocardiogram showing diastolic heart dysfunction, with valvular heart disease with aortic/mitral regurgitation. Also with elevated right sided pressures. Likely pulmonary hypertension related to left heart disease as no underlying parenchymal lung disease noted, no pulmonary emboli noted.     Assessment:  1. Acute hypoxia Resolved  2. Pulmonary edema Resolving  3. 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 Grade 3 diastolic heart dysfunction   5. Chronic CO2 retention on ABG  5. Paraplegia   6. Sacral decubiti     Plan  - Cont. diuresis to aim for negative balance   - Oxygen support to maintain saturation >90%  - PFT/PSG as out patient, based on ABG patient will not qualify for NIV Support      -D/w Wife bedside      -D/w Dr. Reyes  - Cont. antibiotics for pressure injury, as per primary team  - DVT prophylaxis   - D/w Dr. Jackson

## 2019-09-01 NOTE — PROGRESS NOTE ADULT - SUBJECTIVE AND OBJECTIVE BOX
Follow-up Pulmonary Progress Note  Chief Complaint : Acute systolic heart failure    pt feels well  no cp, sob, palp, n/v  no complaints    d/w wife bedside state back to base line.         Allergies :Bactrim (Rash)  Cipro (Unknown)  Levaquin (Unknown)  penicillin (Rash)  sulfa drugs (Unknown)      PAST MEDICAL & SURGICAL HISTORY:  Macular degeneration  Hard of hearing  Diabetes  Paraplegia  H/O rectal sphincterotomy  History of bilateral cataract extraction  H/O colostomy      Medications:  MEDICATIONS  (STANDING):  baclofen 20 milliGRAM(s) Oral <User Schedule>  collagenase Ointment 1 Application(s) Topical daily  diazepam    Tablet 2 milliGRAM(s) Oral two times a day  enoxaparin Injectable 40 milliGRAM(s) SubCutaneous daily  lisinopril 5 milliGRAM(s) Oral daily  metoprolol succinate ER 25 milliGRAM(s) Oral daily  minocycline 100 milliGRAM(s) Oral daily    MEDICATIONS  (PRN):  acetaminophen   Tablet .. 650 milliGRAM(s) Oral every 6 hours PRN Temp greater or equal to 38C (100.4F), Mild Pain (1 - 3)      LABS:                        10.4   7.41  )-----------( 399      ( 01 Sep 2019 05:45 )             33.0     09-01    141  |  101  |  24<H>  ----------------------------<  140<H>  3.3<L>   |  38<H>  |  0.48<L>    Ca    9.0      01 Sep 2019 05:45  Mg     2.0     09-01        CARDIAC MARKERS ( 31 Aug 2019 05:40 )  .034 ng/mL / x     / x     / x     / x                Procalcitonin, Serum: 0.18 ng/mL (08-29-19 @ 21:20)    Serum Pro-Brain Natriuretic Peptide: 3524 pg/mL (08-29-19 @ 21:20)        CULTURES: (if applicable)    Culture - Urine (collected 08-29-19 @ 23:00)  Source: .Urine Clean Catch (Midstream)  Final Report (08-31-19 @ 09:11):    >=3 organisms. Probable collection contamination.    Culture - Blood (collected 08-29-19 @ 21:20)  Source: .Blood Blood-Peripheral  Gram Stain (08-31-19 @ 12:41):    Growth in aerobic bottle: Gram Positive Cocci in Clusters  Preliminary Report (08-31-19 @ 12:41):    Growth in aerobic bottle: Gram Positive Cocci in Clusters    "Due to technical problems, Proteus sp. will Not be reported as part of    the BCID panel until further notice"    ***Blood Panel PCR results on this specimen are available    approximately 3 hours after the Gram stain result.***    Gram stain, PCR, and/or culture results may not always    correspond due to difference in methodologies.    ************************************************************    This PCR assay was performed using [employer].    The following targets are tested for: Enterococcus,    vancomycin resistant enterococci, Listeria monocytogenes,    coagulase negative staphylococci, S. aureus,    methicillin resistant S. aureus, Streptococcus agalactiae    (Group B), S. pneumoniae, S.pyogenes (Group A),    Acinetobacter baumannii, Enterobacter cloacae, E. coli,    Klebsiella oxytoca, K. pneumoniae, Proteus sp.,    Serratia marcescens, Haemophilus influenzae,    Neisseria meningitidis, Pseudomonas aeruginosa, Candida    albicans, C. glabrata, C krusei, C parapsilosis,    C. tropicalis and the KPC resistance gene.  Organism: Blood Culture PCR (08-31-19 @ 14:34)  Organism: Blood Culture PCR (08-31-19 @ 14:34)      -  Coagulase negative Staphylococcus: Detec      Method Type: PCR    Culture - Blood (collected 08-29-19 @ 21:20)  Source: .Blood Blood-Peripheral  Preliminary Report (08-31-19 @ 09:00):    No growth to date.          ABG - ( 31 Aug 2019 10:35 )  pH, Arterial: 7.45  pH, Blood: x     /  pCO2: 49    /  pO2: 76    / HCO3: x     / Base Excess: x     /  SaO2: 95                CAPILLARY BLOOD GLUCOSE      POCT Blood Glucose.: 123 mg/dL (01 Sep 2019 07:59)      RADIOLOGY  CXR:  CXR improving pulm edema      CT:  < from: CT Angio Chest w/ IV Cont (08.30.19 @ 01:03) >  IMPRESSION:   1. Bilateral small pleural effusions.   2. Small pericardial effusion.    < end of copied text >    ECHO:  < from: TTE Echo Complete w/Doppler (08.30.19 @ 07:53) >    Summary:   1. Left ventricular ejection fraction, by visual estimation, is 48%.   2. Mildly decreased global left ventricular systolic function.   3. Spectral Doppler shows restrictive pattern of left ventricular myocardial filling (Grade III diastolic dysfunction).   4.Mild thickening and calcification of the anterior and posterior mitral valve leaflets.   5. Moderate-severe tricuspid regurgitation.   6. Mild aortic regurgitation.   7. Moderate pulmonic valve regurgitation.   8. Dilatation of the aortic root.   9. Estimated pulmonary artery systolic pressure is 95.0 mmHg assuming a right atrial pressure of 15 mmHg, which is consistent with severe pulmonary hypertension.  10. Pulmonary hypertension is present.  11. LA volume Index is 51.5 ml/m² ml/m2.    < end of copied text >  	    VITALS:  T(C): 36.5 (09-01-19 @ 08:27), Max: 37.2 (08-31-19 @ 14:00)  T(F): 97.7 (09-01-19 @ 08:27), Max: 98.9 (08-31-19 @ 14:00)  HR: 59 (09-01-19 @ 08:27) (57 - 72)  BP: 141/57 (09-01-19 @ 08:27) (104/60 - 158/84)  BP(mean): --  ABP: --  ABP(mean): --  RR: 16 (09-01-19 @ 08:27) (15 - 20)  SpO2: 97% (09-01-19 @ 08:27) (95% - 97%)  CVP(mm Hg): --  CVP(cm H2O): --    Ins and Outs     08-31-19 @ 07:01  -  09-01-19 @ 07:00  --------------------------------------------------------  IN: 240 mL / OUT: 400 mL / NET: -160 mL        Height (cm): 182.9 (08-30-19 @ 00:50)  Weight (kg): 76.4 (08-30-19 @ 00:50)  BMI (kg/m2): 22.8 (08-30-19 @ 00:50)        I&O's Detail    31 Aug 2019 07:01  -  01 Sep 2019 07:00  --------------------------------------------------------  IN:    Oral Fluid: 240 mL  Total IN: 240 mL    OUT:    Incontinent per Condom Catheter: 100 mL    Voided: 300 mL  Total OUT: 400 mL    Total NET: -160 mL          Physical Examination:  GENERAL:               Alert, Oriented, No acute distress.    PULM:                     Bilateral air entry, Clear to auscultation bilaterally, no significant sputum production, No Rales, No Rhonchi, No Wheezing  CVS:                         S1, S2,  + Murmur  ABD:                        Soft, distended, nontender, normoactive bowel sounds,   EXT:                         + edema, nontender  NEURO:                  Alert, oriented, interactive  PSYC:                      Calm, + Insight.

## 2019-09-01 NOTE — DISCHARGE NOTE PROVIDER - CARE PROVIDER_API CALL
Reyes, John A (MD)  Internal Medicine  [address], [address]  [place] [address]  Phone: [phone]  Fax: [phone]  Follow Up Time:     Jose Bojorquez)  Cardiology  [address], [address]  [place] [address]  Phone: [phone]  Fax: [phone]  Follow Up Time:

## 2019-09-01 NOTE — PROGRESS NOTE ADULT - PROVIDER SPECIALTY LIST ADULT
Cardiology
Internal Medicine
Internal Medicine
Plastic Surgery
Pulmonology
Cardiology
Internal Medicine

## 2019-09-03 ENCOUNTER — NON-APPOINTMENT (OUTPATIENT)
Age: 79
End: 2019-09-03

## 2019-09-03 ENCOUNTER — APPOINTMENT (OUTPATIENT)
Dept: INTERNAL MEDICINE | Facility: CLINIC | Age: 79
End: 2019-09-03
Payer: MEDICARE

## 2019-09-03 VITALS
OXYGEN SATURATION: 94 % | TEMPERATURE: 98.2 F | HEIGHT: 72 IN | RESPIRATION RATE: 16 BRPM | DIASTOLIC BLOOD PRESSURE: 58 MMHG | HEART RATE: 66 BPM | SYSTOLIC BLOOD PRESSURE: 110 MMHG

## 2019-09-03 DIAGNOSIS — Z09 ENCOUNTER FOR FOLLOW-UP EXAMINATION AFTER COMPLETED TREATMENT FOR CONDITIONS OTHER THAN MALIGNANT NEOPLASM: ICD-10-CM

## 2019-09-03 DIAGNOSIS — Z01.818 ENCOUNTER FOR OTHER PREPROCEDURAL EXAMINATION: ICD-10-CM

## 2019-09-03 PROCEDURE — 99496 TRANSJ CARE MGMT HIGH F2F 7D: CPT | Mod: 25

## 2019-09-03 PROCEDURE — 93000 ELECTROCARDIOGRAM COMPLETE: CPT | Mod: 59

## 2019-09-03 NOTE — PHYSICAL EXAM
[Ill-Appearing] : ill-appearing [Normal Sclera/Conjunctiva] : normal sclera/conjunctiva [Cachectic] : cachexia was observed [PERRL] : pupils equal round and reactive to light [EOMI] : extraocular movements intact [Normal Outer Ear/Nose] : the outer ears and nose were normal in appearance [Normal Oropharynx] : the oropharynx was normal [No Respiratory Distress] : no respiratory distress  [No JVD] : no jugular venous distention [Regular Rhythm] : with a regular rhythm [No Accessory Muscle Use] : no accessory muscle use [No Edema] : there was no peripheral edema [No Extremity Clubbing/Cyanosis] : no extremity clubbing/cyanosis [Soft] : abdomen soft [Non Tender] : non-tender [Non-distended] : non-distended [No Masses] : no abdominal mass palpated [Normal Bowel Sounds] : normal bowel sounds [Normal Anterior Cervical Nodes] : no anterior cervical lymphadenopathy [No CVA Tenderness] : no CVA  tenderness [No Spinal Tenderness] : no spinal tenderness [No Joint Swelling] : no joint swelling [No Rash] : no rash [Grossly Normal Strength/Tone] : grossly normal strength/tone [Normal Affect] : the affect was normal [Normal Insight/Judgement] : insight and judgment were intact [de-identified] : Chronically ill, wheelchair bound elderly paraplegic in no acute distress,  [de-identified] : chronic geronimo catheter [de-identified] : paraplegic

## 2019-09-03 NOTE — HISTORY OF PRESENT ILLNESS
[Coronary Artery Disease] : no coronary artery disease [Recent Myocardial Infarction] : no recent myocardial infarction [Implantable Device/Pacemaker] : no implantable device/pacemaker [COPD] : no COPD [Asthma] : no asthma [Smoker] : not a smoker [Sleep Apnea] : no sleep apnea [(Patient denies any chest pain, claudication, dyspnea on exertion, orthopnea, palpitations or syncope)] : Patient denies any chest pain, claudication, dyspnea on exertion, orthopnea, palpitations or syncope [FreeTextEntry1] : Debridement of wounds [FreeTextEntry2] : 9/10/2019 [FreeTextEntry4] : Pt scheduled for above but was recently hospitalized for acute diastolic CHF, diuresed with improvement. [FreeTextEntry6] : Pt did have shortness of breath was in hospital for a few days [FreeTextEntry3] : Dr Lopez wound care team at [facility] [FreeTextEntry7] : Patient had recent hospital dsicharge for acute diastolic CHF and severe pulmonary hypertension

## 2019-09-03 NOTE — ASSESSMENT
[High Risk Surgery - Intraperitoneal, Intrathoracic or Supringuinal Vascular Procedures] : High Risk Surgery - Intraperitoneal, Intrathoracic or Supringuinal Vascular Procedures - No (0) [Congestive Heart Failure] : Congestive Heart Failure - Yes (1) [Prior Cerebrovascular Accident or TIA] : Prior Cerebrovascular Accident or TIA - No (0) [Creatinine >= 2mg/dL (1 Point)] : Creatinine >= 2mg/dL - No (0) [1] : 1 , RCRI Class: II, Risk of Post-Op Cardiac Complications: 0.9%, Procedure Risk: Low-Risk [Patient Optimized for Surgery] : Patient optimized for surgery [Insulin-dependent Diabetic (1 Point)] : Insulin-dependent Diabetic - No (0) [No Further Testing Recommended] : no further testing recommended

## 2019-09-04 LAB
CULTURE RESULTS: SIGNIFICANT CHANGE UP
SPECIMEN SOURCE: SIGNIFICANT CHANGE UP

## 2019-09-06 ENCOUNTER — NON-APPOINTMENT (OUTPATIENT)
Age: 79
End: 2019-09-06

## 2019-09-06 ENCOUNTER — APPOINTMENT (OUTPATIENT)
Dept: CARDIOLOGY | Facility: CLINIC | Age: 79
End: 2019-09-06
Payer: MEDICARE

## 2019-09-06 VITALS
HEART RATE: 60 BPM | DIASTOLIC BLOOD PRESSURE: 76 MMHG | SYSTOLIC BLOOD PRESSURE: 146 MMHG | OXYGEN SATURATION: 93 % | HEIGHT: 72 IN | RESPIRATION RATE: 17 BRPM

## 2019-09-06 DIAGNOSIS — Z99.3 DEPENDENCE ON WHEELCHAIR: ICD-10-CM

## 2019-09-06 LAB
CULTURE RESULTS: SIGNIFICANT CHANGE UP
SPECIMEN SOURCE: SIGNIFICANT CHANGE UP

## 2019-09-06 PROCEDURE — 99215 OFFICE O/P EST HI 40 MIN: CPT

## 2019-09-06 PROCEDURE — 93000 ELECTROCARDIOGRAM COMPLETE: CPT

## 2019-09-25 ENCOUNTER — OUTPATIENT (OUTPATIENT)
Dept: OUTPATIENT SERVICES | Facility: HOSPITAL | Age: 79
LOS: 1 days | End: 2019-09-25
Payer: MEDICARE

## 2019-09-25 ENCOUNTER — APPOINTMENT (OUTPATIENT)
Dept: MRI IMAGING | Facility: HOSPITAL | Age: 79
End: 2019-09-25
Payer: MEDICARE

## 2019-09-25 DIAGNOSIS — L89.223 PRESSURE ULCER OF LEFT HIP, STAGE 3: ICD-10-CM

## 2019-09-25 DIAGNOSIS — L89.214 PRESSURE ULCER OF RIGHT HIP, STAGE 4: ICD-10-CM

## 2019-09-25 PROCEDURE — 72146 MRI CHEST SPINE W/O DYE: CPT

## 2019-09-25 PROCEDURE — 72148 MRI LUMBAR SPINE W/O DYE: CPT | Mod: 26

## 2019-09-25 PROCEDURE — 72146 MRI CHEST SPINE W/O DYE: CPT | Mod: 26

## 2019-09-25 PROCEDURE — 72148 MRI LUMBAR SPINE W/O DYE: CPT

## 2019-09-26 ENCOUNTER — MEDICATION RENEWAL (OUTPATIENT)
Age: 79
End: 2019-09-26

## 2019-09-30 ENCOUNTER — MEDICATION RENEWAL (OUTPATIENT)
Age: 79
End: 2019-09-30

## 2019-10-11 ENCOUNTER — APPOINTMENT (OUTPATIENT)
Dept: INTERNAL MEDICINE | Facility: CLINIC | Age: 79
End: 2019-10-11
Payer: MEDICARE

## 2019-10-11 VITALS
SYSTOLIC BLOOD PRESSURE: 126 MMHG | DIASTOLIC BLOOD PRESSURE: 60 MMHG | RESPIRATION RATE: 17 BRPM | OXYGEN SATURATION: 97 % | HEART RATE: 64 BPM | TEMPERATURE: 98.7 F

## 2019-10-11 DIAGNOSIS — L89.90 PRESSURE ULCER OF UNSPECIFIED SITE, UNSPECIFIED STAGE: ICD-10-CM

## 2019-10-11 DIAGNOSIS — Z23 ENCOUNTER FOR IMMUNIZATION: ICD-10-CM

## 2019-10-11 DIAGNOSIS — R25.2 CRAMP AND SPASM: ICD-10-CM

## 2019-10-11 DIAGNOSIS — I50.32 CHRONIC DIASTOLIC (CONGESTIVE) HEART FAILURE: ICD-10-CM

## 2019-10-11 PROCEDURE — 90732 PPSV23 VACC 2 YRS+ SUBQ/IM: CPT

## 2019-10-11 PROCEDURE — 99214 OFFICE O/P EST MOD 30 MIN: CPT | Mod: 25

## 2019-10-11 PROCEDURE — G0008: CPT

## 2019-10-11 PROCEDURE — 90686 IIV4 VACC NO PRSV 0.5 ML IM: CPT

## 2019-10-11 PROCEDURE — G0009: CPT

## 2019-10-11 RX ORDER — LISINOPRIL 5 MG/1
5 TABLET ORAL DAILY
Qty: 90 | Refills: 1 | Status: DISCONTINUED | COMMUNITY
Start: 1900-01-01 | End: 2019-10-11

## 2019-10-11 NOTE — PHYSICAL EXAM
[Ill-Appearing] : ill-appearing [Normal Sclera/Conjunctiva] : normal sclera/conjunctiva [Cachectic] : cachexia was observed [PERRL] : pupils equal round and reactive to light [EOMI] : extraocular movements intact [Normal Outer Ear/Nose] : the outer ears and nose were normal in appearance [Normal Oropharynx] : the oropharynx was normal [No JVD] : no jugular venous distention [No Respiratory Distress] : no respiratory distress  [No Accessory Muscle Use] : no accessory muscle use [Regular Rhythm] : with a regular rhythm [No Edema] : there was no peripheral edema [No Extremity Clubbing/Cyanosis] : no extremity clubbing/cyanosis [Soft] : abdomen soft [Non Tender] : non-tender [No Masses] : no abdominal mass palpated [Non-distended] : non-distended [Normal Bowel Sounds] : normal bowel sounds [Normal Anterior Cervical Nodes] : no anterior cervical lymphadenopathy [No Joint Swelling] : no joint swelling [No CVA Tenderness] : no CVA  tenderness [No Spinal Tenderness] : no spinal tenderness [Grossly Normal Strength/Tone] : grossly normal strength/tone [No Rash] : no rash [Normal Affect] : the affect was normal [Normal Insight/Judgement] : insight and judgment were intact [de-identified] : Chronically ill, wheelchair bound elderly paraplegic in no acute distress,  [de-identified] : chronic geronimo catheter [de-identified] : paraplegic

## 2019-10-11 NOTE — HISTORY OF PRESENT ILLNESS
[FreeTextEntry1] : here for follow up  [de-identified] : Wounds are not healing, but remains on wound care w/ doctors and on [facility] hearing is worse\par \par Chronic paraplegia has been his underlying problem that has caused wounds\par -spasms persists relieved\par \par MRI Thoracic spine done was negative for osteo

## 2019-10-11 NOTE — PHYSICAL EXAM
[Ill-Appearing] : ill-appearing [Normal Sclera/Conjunctiva] : normal sclera/conjunctiva [Cachectic] : cachexia was observed [PERRL] : pupils equal round and reactive to light [EOMI] : extraocular movements intact [Normal Outer Ear/Nose] : the outer ears and nose were normal in appearance [Normal Oropharynx] : the oropharynx was normal [No JVD] : no jugular venous distention [No Respiratory Distress] : no respiratory distress  [No Accessory Muscle Use] : no accessory muscle use [Regular Rhythm] : with a regular rhythm [No Edema] : there was no peripheral edema [No Extremity Clubbing/Cyanosis] : no extremity clubbing/cyanosis [Soft] : abdomen soft [Non Tender] : non-tender [No Masses] : no abdominal mass palpated [Non-distended] : non-distended [Normal Bowel Sounds] : normal bowel sounds [Normal Anterior Cervical Nodes] : no anterior cervical lymphadenopathy [No Joint Swelling] : no joint swelling [No Spinal Tenderness] : no spinal tenderness [No CVA Tenderness] : no CVA  tenderness [Grossly Normal Strength/Tone] : grossly normal strength/tone [No Rash] : no rash [Normal Affect] : the affect was normal [Normal Insight/Judgement] : insight and judgment were intact [de-identified] : Chronically ill, wheelchair bound elderly paraplegic in no acute distress,  [de-identified] : chronic geronimo catheter [de-identified] : paraplegic

## 2019-10-11 NOTE — HISTORY OF PRESENT ILLNESS
[FreeTextEntry1] : here for follow up  [de-identified] : Wounds are not healing, but remains on wound care w/ doctors and on [facility] hearing is worse\par \par Chronic paraplegia has been his underlying problem that has caused wounds\par -spasms persists relieved\par \par MRI Thoracic spine done was negative for osteo

## 2019-12-13 RX ORDER — MINOCYCLINE HYDROCHLORIDE 100 MG/1
100 TABLET ORAL
Qty: 90 | Refills: 3 | Status: ACTIVE | COMMUNITY
Start: 2018-12-18 | End: 1900-01-01

## 2019-12-13 RX ORDER — BUMETANIDE 1 MG/1
1 TABLET ORAL
Qty: 90 | Refills: 3 | Status: ACTIVE | COMMUNITY
Start: 2019-09-03 | End: 1900-01-01

## 2019-12-16 ENCOUNTER — RX RENEWAL (OUTPATIENT)
Age: 79
End: 2019-12-16

## 2019-12-16 RX ORDER — DIAZEPAM 2 MG/1
2 TABLET ORAL EVERY 4 HOURS
Qty: 180 | Refills: 0 | Status: ACTIVE | COMMUNITY
Start: 2018-06-25 | End: 1900-01-01

## 2020-01-03 ENCOUNTER — APPOINTMENT (OUTPATIENT)
Dept: CARDIOLOGY | Facility: CLINIC | Age: 80
End: 2020-01-03

## 2020-02-18 ENCOUNTER — APPOINTMENT (OUTPATIENT)
Dept: CARDIOLOGY | Facility: CLINIC | Age: 80
End: 2020-02-18
Payer: MEDICARE

## 2020-02-18 ENCOUNTER — NON-APPOINTMENT (OUTPATIENT)
Age: 80
End: 2020-02-18

## 2020-02-18 VITALS
OXYGEN SATURATION: 96 % | HEIGHT: 72 IN | SYSTOLIC BLOOD PRESSURE: 148 MMHG | RESPIRATION RATE: 17 BRPM | DIASTOLIC BLOOD PRESSURE: 75 MMHG | HEART RATE: 60 BPM

## 2020-02-18 DIAGNOSIS — R94.31 ABNORMAL ELECTROCARDIOGRAM [ECG] [EKG]: ICD-10-CM

## 2020-02-18 DIAGNOSIS — I07.1 RHEUMATIC TRICUSPID INSUFFICIENCY: ICD-10-CM

## 2020-02-18 DIAGNOSIS — I50.9 HEART FAILURE, UNSPECIFIED: ICD-10-CM

## 2020-02-18 PROCEDURE — 99215 OFFICE O/P EST HI 40 MIN: CPT

## 2020-02-18 PROCEDURE — 93000 ELECTROCARDIOGRAM COMPLETE: CPT

## 2020-02-18 RX ORDER — ISOSORBIDE MONONITRATE 30 MG/1
30 TABLET, EXTENDED RELEASE ORAL DAILY
Qty: 1 | Refills: 1 | Status: ACTIVE | COMMUNITY
Start: 2020-02-18 | End: 1900-01-01

## 2020-02-18 RX ORDER — HYDRALAZINE HYDROCHLORIDE 10 MG/1
10 TABLET ORAL
Qty: 60 | Refills: 1 | Status: ACTIVE | COMMUNITY
Start: 2020-02-18 | End: 1900-01-01

## 2020-02-18 RX ORDER — METOPROLOL SUCCINATE 25 MG/1
25 TABLET, EXTENDED RELEASE ORAL DAILY
Qty: 90 | Refills: 1 | Status: ACTIVE | COMMUNITY
Start: 1900-01-01 | End: 1900-01-01

## 2020-03-26 ENCOUNTER — INPATIENT (INPATIENT)
Facility: HOSPITAL | Age: 80
LOS: 0 days | Discharge: HOME | DRG: 292 | End: 2020-03-27
Attending: HOSPITALIST | Admitting: HOSPITALIST
Payer: COMMERCIAL

## 2020-03-26 VITALS
RESPIRATION RATE: 18 BRPM | HEIGHT: 72 IN | DIASTOLIC BLOOD PRESSURE: 97 MMHG | WEIGHT: 154.1 LBS | HEART RATE: 70 BPM | SYSTOLIC BLOOD PRESSURE: 194 MMHG | TEMPERATURE: 98 F | OXYGEN SATURATION: 91 %

## 2020-03-26 DIAGNOSIS — R06.02 SHORTNESS OF BREATH: ICD-10-CM

## 2020-03-26 LAB
ALBUMIN SERPL ELPH-MCNC: 3 G/DL — LOW (ref 3.3–5)
ALP SERPL-CCNC: 137 U/L — HIGH (ref 40–120)
ALT FLD-CCNC: 36 U/L — SIGNIFICANT CHANGE UP (ref 10–45)
ANION GAP SERPL CALC-SCNC: 7 MMOL/L — SIGNIFICANT CHANGE UP (ref 5–17)
APPEARANCE UR: CLEAR — SIGNIFICANT CHANGE UP
APTT BLD: 34.4 SEC — SIGNIFICANT CHANGE UP (ref 27.5–36.3)
AST SERPL-CCNC: 51 U/L — HIGH (ref 10–40)
BACTERIA # UR AUTO: ABNORMAL /HPF
BASOPHILS # BLD AUTO: 0.01 K/UL — SIGNIFICANT CHANGE UP (ref 0–0.2)
BASOPHILS NFR BLD AUTO: 0.1 % — SIGNIFICANT CHANGE UP (ref 0–2)
BILIRUB SERPL-MCNC: 0.3 MG/DL — SIGNIFICANT CHANGE UP (ref 0.2–1.2)
BILIRUB UR-MCNC: NEGATIVE — SIGNIFICANT CHANGE UP
BUN SERPL-MCNC: 21 MG/DL — SIGNIFICANT CHANGE UP (ref 7–23)
CALCIUM SERPL-MCNC: 8.5 MG/DL — SIGNIFICANT CHANGE UP (ref 8.4–10.5)
CHLORIDE SERPL-SCNC: 98 MMOL/L — SIGNIFICANT CHANGE UP (ref 96–108)
CO2 SERPL-SCNC: 31 MMOL/L — SIGNIFICANT CHANGE UP (ref 22–31)
COLOR SPEC: YELLOW — SIGNIFICANT CHANGE UP
CREAT SERPL-MCNC: 0.59 MG/DL — SIGNIFICANT CHANGE UP (ref 0.5–1.3)
DIFF PNL FLD: ABNORMAL
EOSINOPHIL # BLD AUTO: 0.29 K/UL — SIGNIFICANT CHANGE UP (ref 0–0.5)
EOSINOPHIL NFR BLD AUTO: 3.1 % — SIGNIFICANT CHANGE UP (ref 0–6)
EPI CELLS # UR: SIGNIFICANT CHANGE UP
FLU A RESULT: SIGNIFICANT CHANGE UP
FLU A RESULT: SIGNIFICANT CHANGE UP
FLUAV AG NPH QL: SIGNIFICANT CHANGE UP
FLUBV AG NPH QL: SIGNIFICANT CHANGE UP
GLUCOSE SERPL-MCNC: 144 MG/DL — HIGH (ref 70–99)
GLUCOSE UR QL: NEGATIVE — SIGNIFICANT CHANGE UP
HCT VFR BLD CALC: 37.3 % — LOW (ref 39–50)
HGB BLD-MCNC: 12.1 G/DL — LOW (ref 13–17)
IMM GRANULOCYTES NFR BLD AUTO: 0.6 % — SIGNIFICANT CHANGE UP (ref 0–1.5)
INR BLD: 1.09 RATIO — SIGNIFICANT CHANGE UP (ref 0.88–1.16)
KETONES UR-MCNC: NEGATIVE — SIGNIFICANT CHANGE UP
LACTATE SERPL-SCNC: 1.7 MMOL/L — SIGNIFICANT CHANGE UP (ref 0.7–2)
LEUKOCYTE ESTERASE UR-ACNC: NEGATIVE — SIGNIFICANT CHANGE UP
LIDOCAIN IGE QN: 222 U/L — SIGNIFICANT CHANGE UP (ref 73–393)
LYMPHOCYTES # BLD AUTO: 0.64 K/UL — LOW (ref 1–3.3)
LYMPHOCYTES # BLD AUTO: 6.8 % — LOW (ref 13–44)
MCHC RBC-ENTMCNC: 27.8 PG — SIGNIFICANT CHANGE UP (ref 27–34)
MCHC RBC-ENTMCNC: 32.4 GM/DL — SIGNIFICANT CHANGE UP (ref 32–36)
MCV RBC AUTO: 85.7 FL — SIGNIFICANT CHANGE UP (ref 80–100)
MONOCYTES # BLD AUTO: 0.83 K/UL — SIGNIFICANT CHANGE UP (ref 0–0.9)
MONOCYTES NFR BLD AUTO: 8.8 % — SIGNIFICANT CHANGE UP (ref 2–14)
NEUTROPHILS # BLD AUTO: 7.64 K/UL — HIGH (ref 1.8–7.4)
NEUTROPHILS NFR BLD AUTO: 80.6 % — HIGH (ref 43–77)
NITRITE UR-MCNC: NEGATIVE — SIGNIFICANT CHANGE UP
NRBC # BLD: 0 /100 WBCS — SIGNIFICANT CHANGE UP (ref 0–0)
NT-PROBNP SERPL-SCNC: 948 PG/ML — HIGH (ref 0–300)
PH UR: 5 — SIGNIFICANT CHANGE UP (ref 5–8)
PLATELET # BLD AUTO: 257 K/UL — SIGNIFICANT CHANGE UP (ref 150–400)
POTASSIUM SERPL-MCNC: 3.7 MMOL/L — SIGNIFICANT CHANGE UP (ref 3.5–5.3)
POTASSIUM SERPL-SCNC: 3.7 MMOL/L — SIGNIFICANT CHANGE UP (ref 3.5–5.3)
PROT SERPL-MCNC: 7 G/DL — SIGNIFICANT CHANGE UP (ref 6–8.3)
PROT UR-MCNC: 30 MG/DL
PROTHROM AB SERPL-ACNC: 12.2 SEC — SIGNIFICANT CHANGE UP (ref 10–12.9)
RAPID RVP RESULT: SIGNIFICANT CHANGE UP
RBC # BLD: 4.35 M/UL — SIGNIFICANT CHANGE UP (ref 4.2–5.8)
RBC # FLD: 16.4 % — HIGH (ref 10.3–14.5)
RBC CASTS # UR COMP ASSIST: ABNORMAL /HPF (ref 0–4)
RSV RESULT: SIGNIFICANT CHANGE UP
RSV RNA RESP QL NAA+PROBE: SIGNIFICANT CHANGE UP
SODIUM SERPL-SCNC: 136 MMOL/L — SIGNIFICANT CHANGE UP (ref 135–145)
SP GR SPEC: 1.01 — SIGNIFICANT CHANGE UP (ref 1.01–1.02)
TROPONIN I SERPL-MCNC: 0.03 NG/ML — SIGNIFICANT CHANGE UP (ref 0.02–0.06)
TROPONIN I SERPL-MCNC: <.017 NG/ML — LOW (ref 0.02–0.06)
UROBILINOGEN FLD QL: NEGATIVE — SIGNIFICANT CHANGE UP
WBC # BLD: 9.47 K/UL — SIGNIFICANT CHANGE UP (ref 3.8–10.5)
WBC # FLD AUTO: 9.47 K/UL — SIGNIFICANT CHANGE UP (ref 3.8–10.5)
WBC UR QL: SIGNIFICANT CHANGE UP /HPF (ref 0–5)

## 2020-03-26 PROCEDURE — 93010 ELECTROCARDIOGRAM REPORT: CPT

## 2020-03-26 PROCEDURE — 99223 1ST HOSP IP/OBS HIGH 75: CPT | Mod: GC,AI

## 2020-03-26 PROCEDURE — 71045 X-RAY EXAM CHEST 1 VIEW: CPT | Mod: 26

## 2020-03-26 PROCEDURE — 71250 CT THORAX DX C-: CPT | Mod: 26

## 2020-03-26 PROCEDURE — 99285 EMERGENCY DEPT VISIT HI MDM: CPT

## 2020-03-26 RX ORDER — METOPROLOL TARTRATE 50 MG
25 TABLET ORAL DAILY
Refills: 0 | Status: DISCONTINUED | OUTPATIENT
Start: 2020-03-26 | End: 2020-03-26

## 2020-03-26 RX ORDER — HYDRALAZINE HCL 50 MG
10 TABLET ORAL
Refills: 0 | Status: DISCONTINUED | OUTPATIENT
Start: 2020-03-26 | End: 2020-03-26

## 2020-03-26 RX ORDER — HYDRALAZINE HCL 50 MG
10 TABLET ORAL
Refills: 0 | Status: DISCONTINUED | OUTPATIENT
Start: 2020-03-26 | End: 2020-03-27

## 2020-03-26 RX ORDER — BACLOFEN 100 %
20 POWDER (GRAM) MISCELLANEOUS ONCE
Refills: 0 | Status: COMPLETED | OUTPATIENT
Start: 2020-03-26 | End: 2020-03-26

## 2020-03-26 RX ORDER — BUMETANIDE 0.25 MG/ML
1 INJECTION INTRAMUSCULAR; INTRAVENOUS DAILY
Refills: 0 | Status: DISCONTINUED | OUTPATIENT
Start: 2020-03-26 | End: 2020-03-27

## 2020-03-26 RX ORDER — METOPROLOL TARTRATE 50 MG
25 TABLET ORAL DAILY
Refills: 0 | Status: DISCONTINUED | OUTPATIENT
Start: 2020-03-26 | End: 2020-03-27

## 2020-03-26 RX ORDER — BACLOFEN 100 %
20 POWDER (GRAM) MISCELLANEOUS THREE TIMES A DAY
Refills: 0 | Status: DISCONTINUED | OUTPATIENT
Start: 2020-03-26 | End: 2020-03-27

## 2020-03-26 RX ORDER — MINOCYCLINE HYDROCHLORIDE 45 MG/1
100 TABLET, EXTENDED RELEASE ORAL DAILY
Refills: 0 | Status: DISCONTINUED | OUTPATIENT
Start: 2020-03-26 | End: 2020-03-27

## 2020-03-26 RX ORDER — MORPHINE SULFATE 50 MG/1
2 CAPSULE, EXTENDED RELEASE ORAL ONCE
Refills: 0 | Status: DISCONTINUED | OUTPATIENT
Start: 2020-03-26 | End: 2020-03-26

## 2020-03-26 RX ORDER — DIAZEPAM 5 MG
2 TABLET ORAL
Refills: 0 | Status: DISCONTINUED | OUTPATIENT
Start: 2020-03-26 | End: 2020-03-27

## 2020-03-26 RX ORDER — ENOXAPARIN SODIUM 100 MG/ML
40 INJECTION SUBCUTANEOUS DAILY
Refills: 0 | Status: DISCONTINUED | OUTPATIENT
Start: 2020-03-26 | End: 2020-03-27

## 2020-03-26 RX ADMIN — Medication 20 MILLIGRAM(S): at 13:07

## 2020-03-26 RX ADMIN — MORPHINE SULFATE 2 MILLIGRAM(S): 50 CAPSULE, EXTENDED RELEASE ORAL at 13:22

## 2020-03-26 RX ADMIN — Medication 2 MILLIGRAM(S): at 22:24

## 2020-03-26 RX ADMIN — MORPHINE SULFATE 2 MILLIGRAM(S): 50 CAPSULE, EXTENDED RELEASE ORAL at 13:07

## 2020-03-26 RX ADMIN — Medication 20 MILLIGRAM(S): at 22:18

## 2020-03-26 NOTE — ED PROVIDER NOTE - CARE PLAN
Principal Discharge DX:	SOB (shortness of breath) Principal Discharge DX:	SOB (shortness of breath)  Secondary Diagnosis:	Rib pain  Secondary Diagnosis:	Chest pain, unspecified type

## 2020-03-26 NOTE — ED ADULT NURSE NOTE - CHPI ED NUR SYMPTOMS NEG
no tingling/no weakness/no chills/no decreased eating/drinking/no fever/no dizziness/no nausea/no pain/no vomiting

## 2020-03-26 NOTE — H&P ADULT - NSHPREVIEWOFSYSTEMS_GEN_ALL_CORE
REVIEW OF SYSTEMS:  CONSTITUTIONAL: No fever, weight loss, or fatigue  EYES: No eye pain, visual disturbances, or discharge  ENMT:  No difficulty hearing, tinnitus, vertigo; No sinus or throat pain  NECK: No neck pain or neck stiffness  BREASTS: No pain, masses, or nipple discharge  RESPIRATORY: No cough, wheezing, chills or hemoptysis; No shortness of breath  CARDIOVASCULAR: No chest pain, palpitations, dizziness, or leg swelling  GASTROINTESTINAL: Abdominal pain   GENITOURINARY: No dysuria, frequency, hematuria, or incontinence  NEUROLOGICAL: No headaches, memory loss, loss of strength, numbness, or tremors  SKIN: No itching, burning, rashes, or lesions   LYMPH NODES: No enlarged glands  ENDOCRINE: No heat or cold intolerance; No hair loss  MUSCULOSKELETAL: No joint pain or swelling; No muscle, back, or extremity pain  PSYCHIATRIC: No depression, anxiety, mood swings, or difficulty sleeping  HEME/LYMPH: No easy bruising or bleeding  ALLERGY AND IMMUNOLOGIC: No hives or eczema    All other ROS reviewed and negative except as otherwise stated REVIEW OF SYSTEMS:  CONSTITUTIONAL: Progresive weakness, no fevers or chills  EYES: No eye pain, visual disturbances, or discharge  ENMT:  Hard of hearing; No sinus or throat pain  NECK: No neck pain or neck stiffness  BREASTS: No pain, masses, or nipple discharge  RESPIRATORY: SOB, no cough, no hemoptysis  CARDIOVASCULAR: No chest pain, palpitations, dizziness, or leg swelling  GASTROINTESTINAL: Abdominal pain   GENITOURINARY: No dysuria, frequency, hematuria, or incontinence  NEUROLOGICAL: No headaches, memory loss, loss of strength, numbness, or tremors  SKIN: No itching, burning, rashes, or lesions   LYMPH NODES: No enlarged glands  ENDOCRINE: No heat or cold intolerance; No hair loss  MUSCULOSKELETAL: No joint pain or swelling; No muscle, back, or extremity pain      All other ROS reviewed and negative except as otherwise stated
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
## 2020-03-26 NOTE — H&P ADULT - NSHPLABSRESULTS_GEN_ALL_CORE
.  LABS:                         12.1   9.47  )-----------( 257      ( 26 Mar 2020 10:25 )             37.3         136  |  98  |  21  ----------------------------<  144<H>  3.7   |  31  |  0.59    Ca    8.5      26 Mar 2020 10:25    TPro  7.0  /  Alb  3.0<L>  /  TBili  0.3  /  DBili  x   /  AST  51<H>  /  ALT  36  /  AlkPhos  137<H>      PT/INR - ( 26 Mar 2020 10:25 )   PT: 12.2 sec;   INR: 1.09 ratio         PTT - ( 26 Mar 2020 10:25 )  PTT:34.4 sec  Urinalysis Basic - ( 26 Mar 2020 10:25 )    Color: Yellow / Appearance: Clear / S.010 / pH: x  Gluc: x / Ketone: Negative  / Bili: Negative / Urobili: Negative   Blood: x / Protein: 30 mg/dL / Nitrite: Negative   Leuk Esterase: Negative / RBC: 5-10 /HPF / WBC 0-2 /HPF   Sq Epi: x / Non Sq Epi: Neg.-Few / Bacteria: Trace /HPF      CARDIAC MARKERS ( 26 Mar 2020 10:25 )  <.017 ng/mL / x     / x     / x     / x          Serum Pro-Brain Natriuretic Peptide: 948 pg/mL ( @ 10:25)    Lactate, Blood: 1.7 mmol/L ( @ 10:25)      RADIOLOGY, EKG & ADDITIONAL TESTS: Reviewed.     < from: CT Chest No Cont (20 @ 12:01) >    FINDINGS:    LUNGS AND AIRWAYS: Patent central airways.  Streaky bibasilar fibrotic/atelectatic changes. Biapical scarring. No evidence of pneumonia    PLEURA: Trace bilateral pleural effusions    MEDIASTINUM AND DEVAN: No lymphadenopathy by CT size criteria. Lack of IV contrast precludes evaluation for hilar adenopathy    VESSELS: Ectatic ascending thoracic aorta up to 4.2 cm similar to prior. Prominent main pulmonary artery suggestive of pulmonary hypertension    HEART: Cardiomegaly with coronary artery calcification. Trace pericardial effusion.    CHEST WALL AND LOWER NECK: Within normal limits.    VISUALIZED UPPER ABDOMEN: Within normal limits.    BONES: No acute finding    IMPRESSION:     No acute finding on this noncontrast study. Specifically, no scan evidence of pneumonia.  Trace basilar pleural effusions. Trace pericardial effusion.  Ectatic ascending thoracic aorta.  Prominent main pulmonary artery concerning for pulmonary hypertension.    < end of copied text >    ECG (reviewed by me): NSR, 1st degree AV block, LAD, QTc 484    < from: Xray Chest 1 View AP/PA (20 @ 11:04) >      INTERPRETATION:  Chest radiograph (one view)     CPT 64004    CLINICAL INFORMATION:  Patient is unable to communicate. Sepsis.  Short of breath.     TECHNIQUE:  Single frontal viewof the chest was obtained.    FINDINGS:  Prior study dated 2019 was available for review.    The lungs demonstrate linear subsegmental atelectasis at the left base. No focal consolidation is seen. No pleural effusion is noted.    The heart and mediastinum appear intact.      IMPRESSION: No gross consolidation is seen.   Linear subsegmental atelectatic changes noted at the left base.     < end of copied text >

## 2020-03-26 NOTE — ED PROVIDER NOTE - CLINICAL SUMMARY MEDICAL DECISION MAKING FREE TEXT BOX
79M paraplegic, DM, CHF presents c/o worsening sob over the past few days. no cough or fever. no known sick contacts but lives at home with wife and aide who are in and out of the home. No leg swelling. No chest pain. Pt feels weak and sob. See RN note for decubiti. No acute concern for infection at these areas  Rectal temp 99.2F. No movement spontaneously to b/l lower extremities. Will check labs with trop and bnp. CXR and reassess. Will d/w wife for further history. 79M paraplegic, DM, CHF presents c/o worsening sob over the past few days. no cough or fever. no known sick contacts but lives at home with wife and aide who are in and out of the home. No leg swelling. No chest pain. No abd pain. Pt feels weak and sob. See RN note for decubiti. No acute concern for infection at these areas.   Rectal temp 99.2F. No movement spontaneously to b/l lower extremities. Will check labs with trop and bnp. CXR and reassess. Will d/w wife for further history. 79M paraplegic, DM, CHF presents c/o worsening sob over the past few days. no cough or fever. no known sick contacts but lives at home with wife and aide who are in and out of the home. No leg swelling. No chest pain. No abd pain. Pt feels weak and sob. See RN note for decubiti. No acute concern for infection at these areas.   Rectal temp 99.2F. No movement spontaneously to b/l lower extremities. Will check labs with trop and bnp. CXR and reassess. Will d/w wife for further history.+  After talking with wife, I clarified with patient. He feels pain to the upper abdomen and lower chest by the edge of ribs. He still feels sob. Plan to admit. d/w hospitalist. r/o covid; consider cardiac.

## 2020-03-26 NOTE — ED PROVIDER NOTE - FAMILY DETAILS FREE TEXT FOR MDM ADDL HISTORY OBTAINED FROM QUESTION
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 She said that he complained of pain across the upper abdomen and lower chest. She is concerned about coronavirus. No diarrhea. No vomiting. She says he did not tell her that he was short of breath (she understands that is what he told me and staff). We will assess accordingly.

## 2020-03-26 NOTE — H&P ADULT - NSHPSOCIALHISTORY_GEN_ALL_CORE
Hanh Villalba is here for scheduled HDR brachytherapy    HDR Single Channel Cylinder  4    Pre-procedure-  Patient identified, arm band applied, signed consent available.  Medications taken by patient prior to procedure  Pain assessment, none.  /70   Pulse 67   SpO2 94%     Post-procedure-  Pain assessment: 0/10   Device removed without difficulty, Education for possible side effects and management, Discharge teaching reviewed, questions answered and Discharged to home.       Denies smoking, alcohol, or illicit drug use
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
## 2020-03-26 NOTE — ED ADULT NURSE NOTE - NSIMPLEMENTINTERV_GEN_ALL_ED
Implemented All Fall Risk Interventions:  [place] to call system. Call bell, personal items and telephone within reach. Instruct patient to call for assistance. Room bathroom lighting operational. Non-slip footwear when patient is off stretcher. Physically safe environment: no spills, clutter or unnecessary equipment. Stretcher in lowest position, wheels locked, appropriate side rails in place. Provide visual cue, wrist band, yellow gown, etc. Monitor gait and stability. Monitor for mental status changes and reorient to person, place, and time. Review medications for side effects contributing to fall risk. Reinforce activity limits and safety measures with patient and family.

## 2020-03-26 NOTE — ED PROVIDER NOTE - PHYSICAL EXAMINATION
See RN note for decubiti. No acute concern for infection at these areas  Rectal temp 99.2F  No movement spontaneously to b/l lower extremities.

## 2020-03-26 NOTE — ED ADULT NURSE NOTE - OBJECTIVE STATEMENT
Patient presents to ED brought in by ambulance for worsening SOB. Pt denies chest pain/fever/chills. Pt states that he has been staying at home. Has aides that come in and out of the house daily.

## 2020-03-26 NOTE — H&P ADULT - NSHPPHYSICALEXAM_GEN_ALL_CORE
Vital Signs Last 24 Hrs  T(F): 97.9 (26 Mar 2020 09:24), Max: 97.9 (26 Mar 2020 09:24)  HR: 60 (26 Mar 2020 11:32) (59 - 70)  BP: 171/74 (26 Mar 2020 11:32) (108/56 - 194/97)  RR: 21 (26 Mar 2020 11:32) (17 - 21)  SpO2: 100% (26 Mar 2020 11:32) (91% - 100%)    PHYSICAL EXAM:  GENERAL: NAD, well-groomed, well-developed  HEAD:  Atraumatic, Normocephalic  EYES: EOMI, conjunctiva and sclera clear  ENMT: Moist mucous membranes, Good dentition, no thrush  NECK: Supple, No JVD  CHEST/LUNG: Clear to auscultation bilaterally, good air entry, mild tachypnea noted  HEART: RRR; S1/S2, No murmur  ABDOMEN: Soft, Nontender, Nondistended; Bowel sounds present  VASCULAR: Normal pulses, Normal capillary refill  EXTREMITIES: No calf tenderness, No cyanosis, No edema  LYMPH: Normal; No lymphadenopathy noted  SKIN: Warm, Intact  PSYCH: Normal mood, Normal affect  NERVOUS SYSTEM:  A/O x3, Good concentration; CN 2-12 intact, No focal deficits .  VITAL SIGNS:  T(C): 36.6 (03-26-20 @ 09:24), Max: 36.6 (03-26-20 @ 09:24)  T(F): 97.9 (03-26-20 @ 09:24), Max: 97.9 (03-26-20 @ 09:24)  HR: 57 (03-26-20 @ 15:00) (57 - 70)  BP: 125/88 (03-26-20 @ 15:00) (108/56 - 194/97)  BP(mean): 101 (03-26-20 @ 09:58) (101 - 101)  RR: 19 (03-26-20 @ 15:00) (17 - 21)  SpO2: 99% (03-26-20 @ 15:00) (91% - 100%)  Wt(kg): --    PHYSICAL EXAM:    Constitutional: elderly male, hard of hearing, WDWN resting comfortably in bed; NAD  Head: NC/AT  Eyes: PERRLA, EOMI, clear conjunctiva  ENT: no nasal discharge; no oropharyngeal erythema or exudates; dry oral mucosa  Neck: supple; no JVD or thyromegaly  Respiratory: CTA B/L; no W/R/R, no retractions  Cardiac: +S1/S2; RRR; no M/R/G; PMI non-displaced  Gastrointestinal: soft, +colostomy, mildly distended abdomen, nontender, no hepatosplenomegaly  Extremities: WWP, no clubbing or cyanosis; no peripheral edema  Vascular: 2+ radial, DP/PT pulses B/L  Neurologic: AAOx3; answers questions appropriately, follows commands
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
## 2020-03-27 ENCOUNTER — TRANSCRIPTION ENCOUNTER (OUTPATIENT)
Age: 80
End: 2020-03-27

## 2020-03-27 VITALS — SYSTOLIC BLOOD PRESSURE: 125 MMHG | DIASTOLIC BLOOD PRESSURE: 69 MMHG

## 2020-03-27 LAB
ANION GAP SERPL CALC-SCNC: 6 MMOL/L — SIGNIFICANT CHANGE UP (ref 5–17)
BASOPHILS # BLD AUTO: 0.02 K/UL — SIGNIFICANT CHANGE UP (ref 0–0.2)
BASOPHILS NFR BLD AUTO: 0.3 % — SIGNIFICANT CHANGE UP (ref 0–2)
BUN SERPL-MCNC: 16 MG/DL — SIGNIFICANT CHANGE UP (ref 7–23)
CALCIUM SERPL-MCNC: 8.5 MG/DL — SIGNIFICANT CHANGE UP (ref 8.4–10.5)
CHLORIDE SERPL-SCNC: 99 MMOL/L — SIGNIFICANT CHANGE UP (ref 96–108)
CO2 SERPL-SCNC: 31 MMOL/L — SIGNIFICANT CHANGE UP (ref 22–31)
CREAT SERPL-MCNC: 0.37 MG/DL — LOW (ref 0.5–1.3)
CULTURE RESULTS: NO GROWTH — SIGNIFICANT CHANGE UP
EOSINOPHIL # BLD AUTO: 0.3 K/UL — SIGNIFICANT CHANGE UP (ref 0–0.5)
EOSINOPHIL NFR BLD AUTO: 4.8 % — SIGNIFICANT CHANGE UP (ref 0–6)
GLUCOSE SERPL-MCNC: 120 MG/DL — HIGH (ref 70–99)
HBA1C BLD-MCNC: 6.4 % — HIGH (ref 4–5.6)
HCT VFR BLD CALC: 36 % — LOW (ref 39–50)
HGB BLD-MCNC: 11.2 G/DL — LOW (ref 13–17)
IMM GRANULOCYTES NFR BLD AUTO: 0.8 % — SIGNIFICANT CHANGE UP (ref 0–1.5)
LYMPHOCYTES # BLD AUTO: 0.53 K/UL — LOW (ref 1–3.3)
LYMPHOCYTES # BLD AUTO: 8.5 % — LOW (ref 13–44)
MAGNESIUM SERPL-MCNC: 2.1 MG/DL — SIGNIFICANT CHANGE UP (ref 1.6–2.6)
MCHC RBC-ENTMCNC: 27 PG — SIGNIFICANT CHANGE UP (ref 27–34)
MCHC RBC-ENTMCNC: 31.1 GM/DL — LOW (ref 32–36)
MCV RBC AUTO: 86.7 FL — SIGNIFICANT CHANGE UP (ref 80–100)
MONOCYTES # BLD AUTO: 0.67 K/UL — SIGNIFICANT CHANGE UP (ref 0–0.9)
MONOCYTES NFR BLD AUTO: 10.8 % — SIGNIFICANT CHANGE UP (ref 2–14)
NEUTROPHILS # BLD AUTO: 4.64 K/UL — SIGNIFICANT CHANGE UP (ref 1.8–7.4)
NEUTROPHILS NFR BLD AUTO: 74.8 % — SIGNIFICANT CHANGE UP (ref 43–77)
NRBC # BLD: 0 /100 WBCS — SIGNIFICANT CHANGE UP (ref 0–0)
PLATELET # BLD AUTO: 220 K/UL — SIGNIFICANT CHANGE UP (ref 150–400)
POTASSIUM SERPL-MCNC: 3.3 MMOL/L — LOW (ref 3.5–5.3)
POTASSIUM SERPL-SCNC: 3.3 MMOL/L — LOW (ref 3.5–5.3)
PROCALCITONIN SERPL-MCNC: 0.07 NG/ML — SIGNIFICANT CHANGE UP
RBC # BLD: 4.15 M/UL — LOW (ref 4.2–5.8)
RBC # FLD: 16.3 % — HIGH (ref 10.3–14.5)
SARS-COV-2 RNA SPEC QL NAA+PROBE: SIGNIFICANT CHANGE UP
SODIUM SERPL-SCNC: 136 MMOL/L — SIGNIFICANT CHANGE UP (ref 135–145)
SPECIMEN SOURCE: SIGNIFICANT CHANGE UP
TSH SERPL-MCNC: 7.19 UIU/ML — HIGH (ref 0.27–4.2)
WBC # BLD: 6.21 K/UL — SIGNIFICANT CHANGE UP (ref 3.8–10.5)
WBC # FLD AUTO: 6.21 K/UL — SIGNIFICANT CHANGE UP (ref 3.8–10.5)

## 2020-03-27 PROCEDURE — 83036 HEMOGLOBIN GLYCOSYLATED A1C: CPT

## 2020-03-27 PROCEDURE — 99199 UNLISTED SPECIAL SVC PX/RPRT: CPT

## 2020-03-27 PROCEDURE — 87635 SARS-COV-2 COVID-19 AMP PRB: CPT

## 2020-03-27 PROCEDURE — 87633 RESP VIRUS 12-25 TARGETS: CPT

## 2020-03-27 PROCEDURE — 87581 M.PNEUMON DNA AMP PROBE: CPT

## 2020-03-27 PROCEDURE — 80048 BASIC METABOLIC PNL TOTAL CA: CPT

## 2020-03-27 PROCEDURE — 36000 PLACE NEEDLE IN VEIN: CPT

## 2020-03-27 PROCEDURE — 87798 DETECT AGENT NOS DNA AMP: CPT

## 2020-03-27 PROCEDURE — 71045 X-RAY EXAM CHEST 1 VIEW: CPT

## 2020-03-27 PROCEDURE — 85027 COMPLETE CBC AUTOMATED: CPT

## 2020-03-27 PROCEDURE — 99285 EMERGENCY DEPT VISIT HI MDM: CPT | Mod: 25

## 2020-03-27 PROCEDURE — 85610 PROTHROMBIN TIME: CPT

## 2020-03-27 PROCEDURE — 87631 RESP VIRUS 3-5 TARGETS: CPT

## 2020-03-27 PROCEDURE — 71250 CT THORAX DX C-: CPT

## 2020-03-27 PROCEDURE — 83735 ASSAY OF MAGNESIUM: CPT

## 2020-03-27 PROCEDURE — 36415 COLL VENOUS BLD VENIPUNCTURE: CPT

## 2020-03-27 PROCEDURE — 99222 1ST HOSP IP/OBS MODERATE 55: CPT

## 2020-03-27 PROCEDURE — 80053 COMPREHEN METABOLIC PANEL: CPT

## 2020-03-27 PROCEDURE — 87486 CHLMYD PNEUM DNA AMP PROBE: CPT

## 2020-03-27 PROCEDURE — 93306 TTE W/DOPPLER COMPLETE: CPT | Mod: 26

## 2020-03-27 PROCEDURE — 87040 BLOOD CULTURE FOR BACTERIA: CPT

## 2020-03-27 PROCEDURE — C8929: CPT

## 2020-03-27 PROCEDURE — 84443 ASSAY THYROID STIM HORMONE: CPT

## 2020-03-27 PROCEDURE — 83605 ASSAY OF LACTIC ACID: CPT

## 2020-03-27 PROCEDURE — 81001 URINALYSIS AUTO W/SCOPE: CPT

## 2020-03-27 PROCEDURE — 85730 THROMBOPLASTIN TIME PARTIAL: CPT

## 2020-03-27 PROCEDURE — 83690 ASSAY OF LIPASE: CPT

## 2020-03-27 PROCEDURE — 93005 ELECTROCARDIOGRAM TRACING: CPT

## 2020-03-27 PROCEDURE — 84145 PROCALCITONIN (PCT): CPT

## 2020-03-27 PROCEDURE — 87086 URINE CULTURE/COLONY COUNT: CPT

## 2020-03-27 PROCEDURE — 84484 ASSAY OF TROPONIN QUANT: CPT

## 2020-03-27 PROCEDURE — 93010 ELECTROCARDIOGRAM REPORT: CPT

## 2020-03-27 PROCEDURE — 83880 ASSAY OF NATRIURETIC PEPTIDE: CPT

## 2020-03-27 RX ORDER — SODIUM CHLORIDE 9 MG/ML
1000 INJECTION, SOLUTION INTRAVENOUS
Refills: 0 | Status: DISCONTINUED | OUTPATIENT
Start: 2020-03-27 | End: 2020-03-27

## 2020-03-27 RX ORDER — LOSARTAN POTASSIUM 100 MG/1
50 TABLET, FILM COATED ORAL DAILY
Refills: 0 | Status: DISCONTINUED | OUTPATIENT
Start: 2020-03-27 | End: 2020-03-27

## 2020-03-27 RX ORDER — INSULIN LISPRO 100/ML
VIAL (ML) SUBCUTANEOUS
Refills: 0 | Status: DISCONTINUED | OUTPATIENT
Start: 2020-03-27 | End: 2020-03-27

## 2020-03-27 RX ORDER — DEXTROSE 50 % IN WATER 50 %
12.5 SYRINGE (ML) INTRAVENOUS ONCE
Refills: 0 | Status: DISCONTINUED | OUTPATIENT
Start: 2020-03-27 | End: 2020-03-27

## 2020-03-27 RX ORDER — DEXTROSE 50 % IN WATER 50 %
15 SYRINGE (ML) INTRAVENOUS ONCE
Refills: 0 | Status: DISCONTINUED | OUTPATIENT
Start: 2020-03-27 | End: 2020-03-27

## 2020-03-27 RX ORDER — HYDRALAZINE HCL 50 MG
1 TABLET ORAL
Qty: 0 | Refills: 0 | DISCHARGE

## 2020-03-27 RX ORDER — GLUCAGON INJECTION, SOLUTION 0.5 MG/.1ML
1 INJECTION, SOLUTION SUBCUTANEOUS ONCE
Refills: 0 | Status: DISCONTINUED | OUTPATIENT
Start: 2020-03-27 | End: 2020-03-27

## 2020-03-27 RX ORDER — LOSARTAN POTASSIUM 100 MG/1
1 TABLET, FILM COATED ORAL
Qty: 30 | Refills: 0
Start: 2020-03-27

## 2020-03-27 RX ORDER — DEXTROSE 50 % IN WATER 50 %
25 SYRINGE (ML) INTRAVENOUS ONCE
Refills: 0 | Status: DISCONTINUED | OUTPATIENT
Start: 2020-03-27 | End: 2020-03-27

## 2020-03-27 RX ADMIN — LOSARTAN POTASSIUM 50 MILLIGRAM(S): 100 TABLET, FILM COATED ORAL at 16:49

## 2020-03-27 RX ADMIN — MINOCYCLINE HYDROCHLORIDE 100 MILLIGRAM(S): 45 TABLET, EXTENDED RELEASE ORAL at 13:43

## 2020-03-27 RX ADMIN — Medication 20 MILLIGRAM(S): at 06:30

## 2020-03-27 RX ADMIN — ENOXAPARIN SODIUM 40 MILLIGRAM(S): 100 INJECTION SUBCUTANEOUS at 13:42

## 2020-03-27 RX ADMIN — Medication 20 MILLIGRAM(S): at 13:43

## 2020-03-27 RX ADMIN — BUMETANIDE 1 MILLIGRAM(S): 0.25 INJECTION INTRAMUSCULAR; INTRAVENOUS at 06:30

## 2020-03-27 RX ADMIN — Medication 10 MILLIGRAM(S): at 06:30
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
## 2020-03-27 NOTE — DISCHARGE NOTE PROVIDER - NSDCCPCAREPLAN_GEN_ALL_CORE_FT
PRINCIPAL DISCHARGE DIAGNOSIS  Diagnosis: SOB (shortness of breath)  Assessment and Plan of Treatment:       SECONDARY DISCHARGE DIAGNOSES  Diagnosis: CHF, acute  Assessment and Plan of Treatment:     Diagnosis: Rib pain  Assessment and Plan of Treatment:

## 2020-03-27 NOTE — DISCHARGE NOTE NURSING/CASE MANAGEMENT/SOCIAL WORK - PATIENT PORTAL LINK FT
You can access the FollowMyHealth Patient Portal offered by [facility] by registering at the following website: [email or web address]. By joining [employer]’s FollowMyHealth portal, you will also be able to view your health information using other applications (apps) compatible with our system.

## 2020-03-27 NOTE — DISCHARGE NOTE PROVIDER - NSTOBACCOUSAGEY/N_GEN_A_CS
Called patient and gave her the message below, she declines further question and states she greatly appreciates all of our help and appreciates Dr Layne help with this.   No

## 2020-03-27 NOTE — DISCHARGE NOTE PROVIDER - NSDCFUSCHEDAPPT_GEN_ALL_CORE_FT
JERICA MAC ; 04/21/2020 ; NPP [facility] JERICA MAC ; 04/21/2020 ; NPP [facility]

## 2020-03-27 NOTE — CONSULT NOTE ADULT - ASSESSMENT
Patient with chronic plegia, ostomy hbp and spasms  Cough/sob COVID neg  CT of concern for PHT  history of mild lv dysfunction  hbp  elevated p bnp suggesting possible chf        Suggest  Agree dc b blocker due to bradycaria  DC ACE, may use ARB  continue diuretic  echo pending
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
## 2020-03-27 NOTE — CONSULT NOTE ADULT - ASSESSMENT
Patient with plegia, chronic spasms, ostomy with reported cough, history of mild lv dysfunction, elevation of BNP and CT concerning for PHT.      Suggest  echo  may resume diuretic plus HBP rx  agree with not using ACE, consider ARB instead

## 2020-03-27 NOTE — PROGRESS NOTE ADULT - SUBJECTIVE AND OBJECTIVE BOX
Patient is a 79y old  Male who presents with a chief complaint of Shortness of breath (27 Mar 2020 12:06)      Patient seen and examined at bedside.    ALLERGIES:  Bactrim (Rash)  Cipro (Unknown)  Levaquin (Unknown)  penicillin (Rash)  sulfa drugs (Unknown)    MEDICATIONS  (STANDING):  baclofen 20 milliGRAM(s) Oral three times a day  buMETAnide 1 milliGRAM(s) Oral daily  enoxaparin Injectable 40 milliGRAM(s) SubCutaneous daily  losartan 50 milliGRAM(s) Oral daily  minocycline 100 milliGRAM(s) Oral daily    MEDICATIONS  (PRN):  diazepam    Tablet 2 milliGRAM(s) Oral two times a day PRN anxiety    Vital Signs Last 24 Hrs  T(F): 97.9 (27 Mar 2020 06:16), Max: 98.9 (26 Mar 2020 18:52)  HR: 60 (27 Mar 2020 06:16) (47 - 60)  BP: 142/66 (27 Mar 2020 06:16) (114/64 - 154/57)  RR: 18 (27 Mar 2020 06:16) (18 - 19)  SpO2: 98% (27 Mar 2020 06:16) (98% - 100%)  I&O's Summary    PHYSICAL EXAM:  General: NAD, A/O x 3  ENT: MMM  Neck: Supple, No JVD  Lungs: Clear to auscultation bilaterally  Cardio: RRR, S1/S2, No murmurs  Abdomen: Soft, Nontender, Nondistended; Bowel sounds present  Extremities: No calf tenderness, No pitting edema    LABS:                        11.2   6.21  )-----------( 220      ( 27 Mar 2020 07:40 )             36.0         136  |  99  |  16  ----------------------------<  120  3.3   |  31  |  0.37    Ca    8.5      27 Mar 2020 07:40  Mg     2.1         TPro  7.0  /  Alb  3.0  /  TBili  0.3  /  DBili  x   /  AST  51  /  ALT  36  /  AlkPhos  137      eGFR if Non African American: 115 mL/min/1.73M2 (20 @ 07:40)  eGFR if : 134 mL/min/1.73M2 (20 @ 07:40)    PT/INR - ( 26 Mar 2020 10:25 )   PT: 12.2 sec;   INR: 1.09 ratio         PTT - ( 26 Mar 2020 10:25 )  PTT:34.4 sec  Lactate, Blood: 1.7 mmol/L ( @ 10:25)    CARDIAC MARKERS ( 26 Mar 2020 21:30 )  .034 ng/mL / x     / x     / x     / x      CARDIAC MARKERS ( 26 Mar 2020 10:25 )  <.017 ng/mL / x     / x     / x     / x            TSH 7.19   TSH with FT4 reflex --  Total T3 --                 [id number] 6.4    Urinalysis Basic - ( 26 Mar 2020 10:25 )    Color: Yellow / Appearance: Clear / S.010 / pH: x  Gluc: x / Ketone: Negative  / Bili: Negative / Urobili: Negative   Blood: x / Protein: 30 mg/dL / Nitrite: Negative   Leuk Esterase: Negative / RBC: 5-10 /HPF / WBC 0-2 /HPF   Sq Epi: x / Non Sq Epi: Neg.-Few / Bacteria: Trace /HPF        Culture - Urine (collected 26 Mar 2020 10:30)  Source: .Urine Clean Catch (Midstream)  Final Report (27 Mar 2020 10:02):    No growth        RADIOLOGY & ADDITIONAL TESTS:    Care Discussed with Consultants/Other Providers: Patient is a 79y old  Male who presents with a chief complaint of Shortness of breath (27 Mar 2020 12:06)    Patient seen and examined at bedside.  Pt. agitated, demanding to go home.  He has no complaints.    ALLERGIES:  Bactrim (Rash)  Cipro (Unknown)  Levaquin (Unknown)  penicillin (Rash)  sulfa drugs (Unknown)    MEDICATIONS  (STANDING):  baclofen 20 milliGRAM(s) Oral three times a day  buMETAnide 1 milliGRAM(s) Oral daily  enoxaparin Injectable 40 milliGRAM(s) SubCutaneous daily  losartan 50 milliGRAM(s) Oral daily  minocycline 100 milliGRAM(s) Oral daily    MEDICATIONS  (PRN):  diazepam    Tablet 2 milliGRAM(s) Oral two times a day PRN anxiety    Vital Signs Last 24 Hrs  T(F): 97.9 (27 Mar 2020 06:16), Max: 98.9 (26 Mar 2020 18:52)  HR: 60 (27 Mar 2020 06:16) (47 - 60)  BP: 142/66 (27 Mar 2020 06:16) (114/64 - 154/57)  RR: 18 (27 Mar 2020 06:16) (18 - 19)  SpO2: 98% (27 Mar 2020 06:16) (98% - 100%)  I&O's Summary    PHYSICAL EXAM:  General: NAD, A/O x 2- 3, [place].  ENT: MMM, no thrush  Neck: Supple, No JVD  Lungs: Clear to auscultation bilaterally  Cardio: RRR, S1/S2, No murmurs  Abdomen: Soft, Nontender, Nondistended; Bowel sounds present  Extremities: No calf tenderness, No pitting edema    LABS:                        11.2   6.21  )-----------( 220      ( 27 Mar 2020 07:40 )             36.0         136  |  99  |  16  ----------------------------<  120  3.3   |  31  |  0.37    Ca    8.5      27 Mar 2020 07:40  Mg     2.1         TPro  7.0  /  Alb  3.0  /  TBili  0.3  /  DBili  x   /  AST  51  /  ALT  36  /  AlkPhos  137      eGFR if Non African American: 115 mL/min/1.73M2 (20 @ 07:40)  eGFR if : 134 mL/min/1.73M2 (20 @ 07:40)    PT/INR - ( 26 Mar 2020 10:25 )   PT: 12.2 sec;   INR: 1.09 ratio         PTT - ( 26 Mar 2020 10:25 )  PTT:34.4 sec  Lactate, Blood: 1.7 mmol/L ( @ 10:25)    CARDIAC MARKERS ( 26 Mar 2020 21:30 )  .034 ng/mL / x     / x     / x     / x      CARDIAC MARKERS ( 26 Mar 2020 10:25 )  <.017 ng/mL / x     / x     / x     / x            TSH 7.19   TSH with FT4 reflex --  Total T3 --                 [id number] 6.4    Urinalysis Basic - ( 26 Mar 2020 10:25 )    Color: Yellow / Appearance: Clear / S.010 / pH: x  Gluc: x / Ketone: Negative  / Bili: Negative / Urobili: Negative   Blood: x / Protein: 30 mg/dL / Nitrite: Negative   Leuk Esterase: Negative / RBC: 5-10 /HPF / WBC 0-2 /HPF   Sq Epi: x / Non Sq Epi: Neg.-Few / Bacteria: Trace /HPF        Culture - Urine (collected 26 Mar 2020 10:30)  Source: .Urine Clean Catch (Midstream)  Final Report (27 Mar 2020 10:02):    No growth        RADIOLOGY & ADDITIONAL TESTS:    Care Discussed with Consultants/Other Providers: Patient is a 79y old  Male who presents with a chief complaint of Shortness of breath (27 Mar 2020 12:06)    Patient seen and examined at bedside.  Pt. agitated, demanding to go home.  He has no complaints.    ALLERGIES:  Bactrim (Rash)  Cipro (Unknown)  Levaquin (Unknown)  penicillin (Rash)  sulfa drugs (Unknown)    MEDICATIONS  (STANDING):  baclofen 20 milliGRAM(s) Oral three times a day  buMETAnide 1 milliGRAM(s) Oral daily  enoxaparin Injectable 40 milliGRAM(s) SubCutaneous daily  losartan 50 milliGRAM(s) Oral daily  minocycline 100 milliGRAM(s) Oral daily    MEDICATIONS  (PRN):  diazepam    Tablet 2 milliGRAM(s) Oral two times a day PRN anxiety    Vital Signs Last 24 Hrs  T(F): 97.9 (27 Mar 2020 06:16), Max: 98.9 (26 Mar 2020 18:52)  HR: 60 (27 Mar 2020 06:16) (47 - 60)  BP: 142/66 (27 Mar 2020 06:16) (114/64 - 154/57)  RR: 18 (27 Mar 2020 06:16) (18 - 19)  SpO2: 98% (27 Mar 2020 06:16) (98% - 100%)  I&O's Summary    PHYSICAL EXAM:  General: NAD, A/O x 2- 3, [place].  ENT: MMM, no thrush  Neck: Supple, No JVD  Lungs: good air entry, clear to auscultation bilaterally, no w/r/r  Cardio: RRR, S1/S2, No murmurs  Abdomen: Soft, Nontender, +colostomy in place, nondistended; Bowel sounds present  Extremities: No calf tenderness, No pitting edema  Neuro:  speech fluent, +paraplegia    LABS:                        11.2   6.21  )-----------( 220      ( 27 Mar 2020 07:40 )             36.0     03-    136  |  99  |  16  ----------------------------<  120  3.3   |  31  |  0.37    Ca    8.5      27 Mar 2020 07:40  Mg     2.1     03-    TPro  7.0  /  Alb  3.0  /  TBili  0.3  /  DBili  x   /  AST  51  /  ALT  36  /  AlkPhos  137      eGFR if Non African American: 115 mL/min/1.73M2 (20 @ 07:40)  eGFR if : 134 mL/min/1.73M2 (20 @ 07:40)    PT/INR - ( 26 Mar 2020 10:25 )   PT: 12.2 sec;   INR: 1.09 ratio         PTT - ( 26 Mar 2020 10:25 )  PTT:34.4 sec  Lactate, Blood: 1.7 mmol/L ( @ 10:25)    CARDIAC MARKERS ( 26 Mar 2020 21:30 )  .034 ng/mL / x     / x     / x     / x      CARDIAC MARKERS ( 26 Mar 2020 10:25 )  <.017 ng/mL / x     / x     / x     / x            TSH 7.19   TSH with FT4 reflex --  Total T3 --                 [id number] 6.4    Urinalysis Basic - ( 26 Mar 2020 10:25 )    Color: Yellow / Appearance: Clear / S.010 / pH: x  Gluc: x / Ketone: Negative  / Bili: Negative / Urobili: Negative   Blood: x / Protein: 30 mg/dL / Nitrite: Negative   Leuk Esterase: Negative / RBC: 5-10 /HPF / WBC 0-2 /HPF   Sq Epi: x / Non Sq Epi: Neg.-Few / Bacteria: Trace /HPF        Culture - Urine (collected 26 Mar 2020 10:30)  Source: .Urine Clean Catch (Midstream)  Final Report (27 Mar 2020 10:02):    No growth        RADIOLOGY & ADDITIONAL TESTS:    Care Discussed with Consultants/Other Providers:

## 2020-03-27 NOTE — DISCHARGE NOTE PROVIDER - NSDCMRMEDTOKEN_GEN_ALL_CORE_FT
baclofen 20 mg oral tablet: 1 tab(s) orally 3 times a day  Bumex 1 mg oral tablet: 1 tab(s) orally once a day  losartan 50 mg oral tablet: 1 tab(s) orally once a day  minocycline 100 mg oral tablet: orally once a day  Valium 2 mg oral tablet: 1 tab(s) orally 2 times a day

## 2020-03-27 NOTE — DISCHARGE NOTE PROVIDER - CARE PROVIDER_API CALL
Reyes, John A (MD)  Internal Medicine  [address] [facility], [address]  [place] [address]  Phone: [phone]  Fax: [phone]  Follow Up Time:
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
## 2020-03-30 LAB
CULTURE RESULTS: SIGNIFICANT CHANGE UP
CULTURE RESULTS: SIGNIFICANT CHANGE UP
SPECIMEN SOURCE: SIGNIFICANT CHANGE UP
SPECIMEN SOURCE: SIGNIFICANT CHANGE UP

## 2020-03-31 ENCOUNTER — INPATIENT (INPATIENT)
Facility: HOSPITAL | Age: 80
LOS: 6 days | Discharge: HOME | DRG: 871 | End: 2020-04-07
Attending: FAMILY MEDICINE | Admitting: FAMILY MEDICINE
Payer: MEDICARE

## 2020-03-31 VITALS
SYSTOLIC BLOOD PRESSURE: 109 MMHG | OXYGEN SATURATION: 98 % | RESPIRATION RATE: 23 BRPM | TEMPERATURE: 101 F | HEART RATE: 109 BPM | HEIGHT: 72 IN | WEIGHT: 147.05 LBS | DIASTOLIC BLOOD PRESSURE: 97 MMHG

## 2020-03-31 DIAGNOSIS — A41.9 SEPSIS, UNSPECIFIED ORGANISM: ICD-10-CM

## 2020-03-31 LAB
ALBUMIN SERPL ELPH-MCNC: 3.1 G/DL — LOW (ref 3.3–5)
ALP SERPL-CCNC: 264 U/L — HIGH (ref 40–120)
ALT FLD-CCNC: 62 U/L — HIGH (ref 10–45)
ANION GAP SERPL CALC-SCNC: 13 MMOL/L — SIGNIFICANT CHANGE UP (ref 5–17)
APPEARANCE UR: ABNORMAL
APTT BLD: 32.1 SEC — SIGNIFICANT CHANGE UP (ref 27.5–36.3)
AST SERPL-CCNC: 122 U/L — HIGH (ref 10–40)
BACTERIA # UR AUTO: ABNORMAL /HPF
BASOPHILS # BLD AUTO: 0.02 K/UL — SIGNIFICANT CHANGE UP (ref 0–0.2)
BASOPHILS NFR BLD AUTO: 0.2 % — SIGNIFICANT CHANGE UP (ref 0–2)
BILIRUB SERPL-MCNC: 0.4 MG/DL — SIGNIFICANT CHANGE UP (ref 0.2–1.2)
BILIRUB UR-MCNC: NEGATIVE — SIGNIFICANT CHANGE UP
BUN SERPL-MCNC: 19 MG/DL — SIGNIFICANT CHANGE UP (ref 7–23)
CALCIUM SERPL-MCNC: 8.5 MG/DL — SIGNIFICANT CHANGE UP (ref 8.4–10.5)
CHLORIDE SERPL-SCNC: 93 MMOL/L — LOW (ref 96–108)
CK SERPL-CCNC: 79 U/L — SIGNIFICANT CHANGE UP (ref 30–200)
CO2 SERPL-SCNC: 28 MMOL/L — SIGNIFICANT CHANGE UP (ref 22–31)
COLOR SPEC: ABNORMAL
CREAT SERPL-MCNC: 0.77 MG/DL — SIGNIFICANT CHANGE UP (ref 0.5–1.3)
CRP SERPL-MCNC: 7.57 MG/DL — HIGH (ref 0–0.4)
DIFF PNL FLD: ABNORMAL
EOSINOPHIL # BLD AUTO: 0.1 K/UL — SIGNIFICANT CHANGE UP (ref 0–0.5)
EOSINOPHIL NFR BLD AUTO: 0.9 % — SIGNIFICANT CHANGE UP (ref 0–6)
EPI CELLS # UR: SIGNIFICANT CHANGE UP
FLU A RESULT: SIGNIFICANT CHANGE UP
FLU A RESULT: SIGNIFICANT CHANGE UP
FLUAV AG NPH QL: SIGNIFICANT CHANGE UP
FLUBV AG NPH QL: SIGNIFICANT CHANGE UP
GLUCOSE BLDC GLUCOMTR-MCNC: 184 MG/DL — HIGH (ref 70–99)
GLUCOSE SERPL-MCNC: 162 MG/DL — HIGH (ref 70–99)
GLUCOSE UR QL: NEGATIVE — SIGNIFICANT CHANGE UP
HCT VFR BLD CALC: 40.4 % — SIGNIFICANT CHANGE UP (ref 39–50)
HGB BLD-MCNC: 12.9 G/DL — LOW (ref 13–17)
IMM GRANULOCYTES NFR BLD AUTO: 0.7 % — SIGNIFICANT CHANGE UP (ref 0–1.5)
INR BLD: 1.16 RATIO — SIGNIFICANT CHANGE UP (ref 0.88–1.16)
KETONES UR-MCNC: NEGATIVE — SIGNIFICANT CHANGE UP
LACTATE SERPL-SCNC: 1.3 MMOL/L — SIGNIFICANT CHANGE UP (ref 0.7–2)
LACTATE SERPL-SCNC: 6.3 MMOL/L — CRITICAL HIGH (ref 0.7–2)
LDH SERPL L TO P-CCNC: 508 U/L — HIGH (ref 50–242)
LEUKOCYTE ESTERASE UR-ACNC: ABNORMAL
LYMPHOCYTES # BLD AUTO: 2.14 K/UL — SIGNIFICANT CHANGE UP (ref 1–3.3)
LYMPHOCYTES # BLD AUTO: 20.3 % — SIGNIFICANT CHANGE UP (ref 13–44)
MCHC RBC-ENTMCNC: 27.5 PG — SIGNIFICANT CHANGE UP (ref 27–34)
MCHC RBC-ENTMCNC: 31.9 GM/DL — LOW (ref 32–36)
MCV RBC AUTO: 86.1 FL — SIGNIFICANT CHANGE UP (ref 80–100)
MONOCYTES # BLD AUTO: 0.79 K/UL — SIGNIFICANT CHANGE UP (ref 0–0.9)
MONOCYTES NFR BLD AUTO: 7.5 % — SIGNIFICANT CHANGE UP (ref 2–14)
NEUTROPHILS # BLD AUTO: 7.41 K/UL — HIGH (ref 1.8–7.4)
NEUTROPHILS NFR BLD AUTO: 70.4 % — SIGNIFICANT CHANGE UP (ref 43–77)
NITRITE UR-MCNC: NEGATIVE — SIGNIFICANT CHANGE UP
NRBC # BLD: 0 /100 WBCS — SIGNIFICANT CHANGE UP (ref 0–0)
NT-PROBNP SERPL-SCNC: 1784 PG/ML — HIGH (ref 0–300)
PH UR: 6 — SIGNIFICANT CHANGE UP (ref 5–8)
PLATELET # BLD AUTO: 280 K/UL — SIGNIFICANT CHANGE UP (ref 150–400)
POTASSIUM SERPL-MCNC: 3.3 MMOL/L — LOW (ref 3.5–5.3)
POTASSIUM SERPL-SCNC: 3.3 MMOL/L — LOW (ref 3.5–5.3)
PROCALCITONIN SERPL-MCNC: 0.17 NG/ML — HIGH
PROT SERPL-MCNC: 7.2 G/DL — SIGNIFICANT CHANGE UP (ref 6–8.3)
PROT UR-MCNC: 30 MG/DL
PROTHROM AB SERPL-ACNC: 13.1 SEC — HIGH (ref 10–12.9)
RBC # BLD: 4.69 M/UL — SIGNIFICANT CHANGE UP (ref 4.2–5.8)
RBC # FLD: 16.2 % — HIGH (ref 10.3–14.5)
RBC CASTS # UR COMP ASSIST: >50 /HPF (ref 0–4)
RSV RESULT: SIGNIFICANT CHANGE UP
RSV RNA RESP QL NAA+PROBE: SIGNIFICANT CHANGE UP
SODIUM SERPL-SCNC: 134 MMOL/L — LOW (ref 135–145)
SP GR SPEC: 1.01 — SIGNIFICANT CHANGE UP (ref 1.01–1.02)
TROPONIN I SERPL-MCNC: 0.04 NG/ML — SIGNIFICANT CHANGE UP (ref 0.02–0.06)
UROBILINOGEN FLD QL: NEGATIVE — SIGNIFICANT CHANGE UP
WBC # BLD: 10.53 K/UL — HIGH (ref 3.8–10.5)
WBC # FLD AUTO: 10.53 K/UL — HIGH (ref 3.8–10.5)
WBC UR QL: SIGNIFICANT CHANGE UP /HPF (ref 0–5)

## 2020-03-31 PROCEDURE — 99285 EMERGENCY DEPT VISIT HI MDM: CPT

## 2020-03-31 PROCEDURE — 71045 X-RAY EXAM CHEST 1 VIEW: CPT | Mod: 26

## 2020-03-31 PROCEDURE — 99223 1ST HOSP IP/OBS HIGH 75: CPT | Mod: GC,AI

## 2020-03-31 RX ORDER — AZTREONAM 2 G
2000 VIAL (EA) INJECTION EVERY 8 HOURS
Refills: 0 | Status: DISCONTINUED | OUTPATIENT
Start: 2020-03-31 | End: 2020-04-04

## 2020-03-31 RX ORDER — LOSARTAN POTASSIUM 100 MG/1
50 TABLET, FILM COATED ORAL DAILY
Refills: 0 | Status: DISCONTINUED | OUTPATIENT
Start: 2020-03-31 | End: 2020-04-07

## 2020-03-31 RX ORDER — DIAZEPAM 5 MG
2 TABLET ORAL
Refills: 0 | Status: COMPLETED | OUTPATIENT
Start: 2020-03-31 | End: 2020-04-07

## 2020-03-31 RX ORDER — NITROGLYCERIN 6.5 MG
1 CAPSULE, EXTENDED RELEASE ORAL ONCE
Refills: 0 | Status: COMPLETED | OUTPATIENT
Start: 2020-03-31 | End: 2020-03-31

## 2020-03-31 RX ORDER — DEXTROSE 50 % IN WATER 50 %
25 SYRINGE (ML) INTRAVENOUS ONCE
Refills: 0 | Status: DISCONTINUED | OUTPATIENT
Start: 2020-03-31 | End: 2020-04-05

## 2020-03-31 RX ORDER — ENOXAPARIN SODIUM 100 MG/ML
40 INJECTION SUBCUTANEOUS DAILY
Refills: 0 | Status: DISCONTINUED | OUTPATIENT
Start: 2020-03-31 | End: 2020-04-07

## 2020-03-31 RX ORDER — DEXTROSE 50 % IN WATER 50 %
25 SYRINGE (ML) INTRAVENOUS ONCE
Refills: 0 | Status: DISCONTINUED | OUTPATIENT
Start: 2020-03-31 | End: 2020-04-07

## 2020-03-31 RX ORDER — VANCOMYCIN HCL 1 G
1000 VIAL (EA) INTRAVENOUS ONCE
Refills: 0 | Status: COMPLETED | OUTPATIENT
Start: 2020-03-31 | End: 2020-03-31

## 2020-03-31 RX ORDER — MINOCYCLINE HYDROCHLORIDE 45 MG/1
100 TABLET, EXTENDED RELEASE ORAL DAILY
Refills: 0 | Status: DISCONTINUED | OUTPATIENT
Start: 2020-03-31 | End: 2020-03-31

## 2020-03-31 RX ORDER — BACLOFEN 100 %
20 POWDER (GRAM) MISCELLANEOUS THREE TIMES A DAY
Refills: 0 | Status: DISCONTINUED | OUTPATIENT
Start: 2020-03-31 | End: 2020-04-07

## 2020-03-31 RX ORDER — POTASSIUM CHLORIDE 20 MEQ
40 PACKET (EA) ORAL ONCE
Refills: 0 | Status: COMPLETED | OUTPATIENT
Start: 2020-03-31 | End: 2020-03-31

## 2020-03-31 RX ORDER — GLUCAGON INJECTION, SOLUTION 0.5 MG/.1ML
1 INJECTION, SOLUTION SUBCUTANEOUS ONCE
Refills: 0 | Status: DISCONTINUED | OUTPATIENT
Start: 2020-03-31 | End: 2020-04-07

## 2020-03-31 RX ORDER — FUROSEMIDE 40 MG
20 TABLET ORAL ONCE
Refills: 0 | Status: COMPLETED | OUTPATIENT
Start: 2020-03-31 | End: 2020-03-31

## 2020-03-31 RX ORDER — SODIUM CHLORIDE 9 MG/ML
2100 INJECTION INTRAMUSCULAR; INTRAVENOUS; SUBCUTANEOUS ONCE
Refills: 0 | Status: COMPLETED | OUTPATIENT
Start: 2020-03-31 | End: 2020-03-31

## 2020-03-31 RX ORDER — SODIUM CHLORIDE 9 MG/ML
2400 INJECTION INTRAMUSCULAR; INTRAVENOUS; SUBCUTANEOUS ONCE
Refills: 0 | Status: DISCONTINUED | OUTPATIENT
Start: 2020-03-31 | End: 2020-03-31

## 2020-03-31 RX ORDER — DEXTROSE 50 % IN WATER 50 %
12.5 SYRINGE (ML) INTRAVENOUS ONCE
Refills: 0 | Status: DISCONTINUED | OUTPATIENT
Start: 2020-03-31 | End: 2020-04-05

## 2020-03-31 RX ORDER — ACETAMINOPHEN 500 MG
975 TABLET ORAL ONCE
Refills: 0 | Status: COMPLETED | OUTPATIENT
Start: 2020-03-31 | End: 2020-03-31

## 2020-03-31 RX ORDER — SODIUM CHLORIDE 9 MG/ML
1000 INJECTION, SOLUTION INTRAVENOUS
Refills: 0 | Status: DISCONTINUED | OUTPATIENT
Start: 2020-03-31 | End: 2020-04-05

## 2020-03-31 RX ORDER — BUMETANIDE 0.25 MG/ML
1 INJECTION INTRAMUSCULAR; INTRAVENOUS DAILY
Refills: 0 | Status: DISCONTINUED | OUTPATIENT
Start: 2020-03-31 | End: 2020-04-07

## 2020-03-31 RX ORDER — DEXTROSE 50 % IN WATER 50 %
15 SYRINGE (ML) INTRAVENOUS ONCE
Refills: 0 | Status: DISCONTINUED | OUTPATIENT
Start: 2020-03-31 | End: 2020-04-05

## 2020-03-31 RX ORDER — INSULIN LISPRO 100/ML
VIAL (ML) SUBCUTANEOUS
Refills: 0 | Status: DISCONTINUED | OUTPATIENT
Start: 2020-03-31 | End: 2020-04-05

## 2020-03-31 RX ORDER — VANCOMYCIN HCL 1 G
VIAL (EA) INTRAVENOUS
Refills: 0 | Status: DISCONTINUED | OUTPATIENT
Start: 2020-03-31 | End: 2020-04-04

## 2020-03-31 RX ORDER — AZTREONAM 2 G
1000 VIAL (EA) INJECTION ONCE
Refills: 0 | Status: COMPLETED | OUTPATIENT
Start: 2020-03-31 | End: 2020-03-31

## 2020-03-31 RX ORDER — VANCOMYCIN HCL 1 G
1000 VIAL (EA) INTRAVENOUS EVERY 12 HOURS
Refills: 0 | Status: DISCONTINUED | OUTPATIENT
Start: 2020-04-01 | End: 2020-04-04

## 2020-03-31 RX ADMIN — Medication 50 MILLIGRAM(S): at 09:30

## 2020-03-31 RX ADMIN — Medication 1000 MILLIGRAM(S): at 10:30

## 2020-03-31 RX ADMIN — Medication 975 MILLIGRAM(S): at 09:30

## 2020-03-31 RX ADMIN — Medication 20 MILLIGRAM(S): at 14:42

## 2020-03-31 RX ADMIN — Medication 20 MILLIGRAM(S): at 23:59

## 2020-03-31 RX ADMIN — Medication 20 MILLIGRAM(S): at 09:56

## 2020-03-31 RX ADMIN — Medication 2 MILLIGRAM(S): at 17:01

## 2020-03-31 RX ADMIN — Medication 1 INCH(S): at 22:00

## 2020-03-31 RX ADMIN — Medication 1: at 17:01

## 2020-03-31 RX ADMIN — Medication 250 MILLIGRAM(S): at 14:35

## 2020-03-31 RX ADMIN — Medication 40 MILLIEQUIVALENT(S): at 14:35

## 2020-03-31 RX ADMIN — SODIUM CHLORIDE 2100 MILLILITER(S): 9 INJECTION INTRAMUSCULAR; INTRAVENOUS; SUBCUTANEOUS at 12:45

## 2020-03-31 RX ADMIN — Medication 100 MILLIGRAM(S): at 17:01

## 2020-03-31 RX ADMIN — Medication 1 INCH(S): at 09:57

## 2020-03-31 RX ADMIN — SODIUM CHLORIDE 2100 MILLILITER(S): 9 INJECTION INTRAMUSCULAR; INTRAVENOUS; SUBCUTANEOUS at 11:00

## 2020-03-31 NOTE — ED ADULT NURSE REASSESSMENT NOTE - NS ED NURSE REASSESS COMMENT FT1
Pt is alert, denies SOB or chest paIn, on NC at 2 l per min with Sat of 99%. Denies any chest pain. Vitals stable. Airborne precaution maintained.
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
## 2020-03-31 NOTE — H&P ADULT - NSHPREVIEWOFSYSTEMS_GEN_ALL_CORE
REVIEW OF SYSTEMS:  CONSTITUTIONAL: +Fever   EYES: No eye pain, visual disturbances, or discharge  ENMT:  +[place].   NECK: No neck pain or neck stiffness  RESPIRATORY: +Shortness of breath. No cough, chills or hemoptysis  CARDIOVASCULAR: No chest pain, palpitations, dizziness, or leg swelling  GASTROINTESTINAL: No abdominal pain, No nausea, vomiting, or hematemesis; No diarrhea or constipation  GENITOURINARY: No dysuria, frequency, hematuria, or incontinence  NEUROLOGICAL: No headaches, memory loss, loss of strength, numbness, or tremors  SKIN: +Chronic wound. No itching, burning, rashes, or lesions   MUSCULOSKELETAL: +Paraplegia. No joint pain or swelling; No muscle, back, or extremity pain      All other ROS reviewed and negative except as otherwise stated

## 2020-03-31 NOTE — ED ADULT NURSE NOTE - CHIEF COMPLAINT QUOTE
BIB EMS from home with worsening shortness of breath and b/l wheezing. Pt on non-rebreather, pulse ox 80's at RA.  PMHX: CHF, Colostomy, Urinary retention.

## 2020-03-31 NOTE — ED PROVIDER NOTE - CLINICAL SUMMARY MEDICAL DECISION MAKING FREE TEXT BOX
Returned today 3/31 with worsening SOB, persistent, hypoxic to 80s on room air. Found to be in septic shock- febrile to 101.3, initial lactate 6.3. Patient was 79 y pt cc fever sob wheezing   treated with IVF bolus and one dose azactam. ED course also notable for hypertension which improved with nitro-paste and elevated proBNP, he was given 20mg lasix IV. FluA/FluB/RSV negative, sent COVID-19 specimen again.

## 2020-03-31 NOTE — H&P ADULT - NSHPLABSRESULTS_GEN_ALL_CORE
12.9   10.53 )-----------( 280      ( 31 Mar 2020 09:15 )             40.4     03-31    134<L>  |  93<L>  |  19  ----------------------------<  162<H>  3.3<L>   |  28  |  0.77    Ca    8.5      31 Mar 2020 09:15    TPro  7.2  /  Alb  3.1<L>  /  TBili  0.4  /  DBili  x   /  AST  122<H>  /  ALT  62<H>  /  AlkPhos  264<H>  03-31    Serum Pro-Brain Natriuretic Peptide (03.31.20 @ 09:15)    Serum Pro-Brain Natriuretic Peptide: 1784 pg/mL    Troponin I, Serum (03.31.20 @ 09:15)    Troponin I, Serum: .040: The new reference range for Troponin-I performed on the Siemens EXL  system is 0.017-0.056 ng/mL which includes the 99th percentile of a  healthy reference population. Studies have shown that elevated troponin  levels above the cutoff are associated with an increased risk for adverse  cardiac events, with the risk increasing as troponin levels increase.  As  per a joint committee of the American College of Cardiology and   Society of Cardiology, diagnosis of classic MI is based upon the  detection of a rise or fall of cardiac troponin values, with at least one  value above the 99th percentile upper reference limit, in the appropriate  clinical context.  · Troponin I (ng/mL) Interpretation  · 0.017-0.056  Normal range (includes the 99th percentile of a healthy  reference population)  · >0.056  Elevated troponin level indicating increased risk  · Note: Troponin-I and Troponin T cannot be used interchangeably in  serial measurements.  Minimally elevated Troponin results should be  interpreted in the context of clinical findings and risk factors. ng/mL    FLU A B RSV Detection by PCR (03.31.20 @ 09:15)    Flu A Result: NotDetec: The Flu A B RSV assay is a Real-Time PCR test for the qualitative  detection and differentiation of Influenza A, Influenza B, and  Respiratory Syncytial Virus on nasopharyngeal swabs. The results should  be interpreted in the context of all clinical and laboratory findings.    Flu B Result: NotDete    RSV Result: NotDetec      Urinalysis (03.31.20 @ 09:15)    Blood, Urine: Large    Glucose Qualitative, Urine: Negative    pH Urine: 6.0    Color: Kita    Urine Appearance: Slightly Turbid    Bilirubin: Negative    Ketone - Urine: Negative    Specific Gravity: 1.010    Protein, Urine: 30 mg/dL    Urobilinogen: Negative    Nitrite: Negative    Leukocyte Esterase Concentration: Trace    < from: Xray Chest 1 View-PORTABLE IMMEDIATE (03.31.20 @ 09:50) >    EXAM:  XR CHEST PORTABLE IMMED 1V      PROCEDURE DATE:  03/31/2020        INTERPRETATION:  Views:1  Comparison: 5 days prior  History: Shortness of breath, cough and fever    The lungs are clear of infiltrates and effusions. There is mild cardiomegaly. The cardiac and mediastinal contours otherwise appear unremarkable.     Impression:  1. No evidence of acute pulmonary disease.      < end of copied text > 12.9   10.53 )-----------( 280      ( 31 Mar 2020 09:15 )             40.4     03-31    134<L>  |  93<L>  |  19  ----------------------------<  162<H>  3.3<L>   |  28  |  0.77    Ca    8.5      31 Mar 2020 09:15    TPro  7.2  /  Alb  3.1<L>  /  TBili  0.4  /  DBili  x   /  AST  122<H>  /  ALT  62<H>  /  AlkPhos  264<H>  03-31    Serum Pro-Brain Natriuretic Peptide (03.31.20 @ 09:15)    Serum Pro-Brain Natriuretic Peptide: 1784 pg/mL    Troponin I, Serum (03.31.20 @ 09:15)    Troponin I, Serum: .040: The new reference range for Troponin-I performed on the Siemens EXL  system is 0.017-0.056 ng/mL which includes the 99th percentile of a  healthy reference population. Studies have shown that elevated troponin  levels above the cutoff are associated with an increased risk for adverse  cardiac events, with the risk increasing as troponin levels increase.  As  per a joint committee of the American College of Cardiology and   Society of Cardiology, diagnosis of classic MI is based upon the  detection of a rise or fall of cardiac troponin values, with at least one  value above the 99th percentile upper reference limit, in the appropriate  clinical context.  · Troponin I (ng/mL) Interpretation  · 0.017-0.056  Normal range (includes the 99th percentile of a healthy  reference population)  · >0.056  Elevated troponin level indicating increased risk  · Note: Troponin-I and Troponin T cannot be used interchangeably in  serial measurements.  Minimally elevated Troponin results should be  interpreted in the context of clinical findings and risk factors. ng/mL    FLU A B RSV Detection by PCR (03.31.20 @ 09:15)    Flu A Result: NotDetec: The Flu A B RSV assay is a Real-Time PCR test for the qualitative  detection and differentiation of Influenza A, Influenza B, and  Respiratory Syncytial Virus on nasopharyngeal swabs. The results should  be interpreted in the context of all clinical and laboratory findings.    Flu B Result: NotDete    RSV Result: NotDetec      Urinalysis (03.31.20 @ 09:15)    Blood, Urine: Large    Glucose Qualitative, Urine: Negative    pH Urine: 6.0    Color: Kita    Urine Appearance: Slightly Turbid    Bilirubin: Negative    Ketone - Urine: Negative    Specific Gravity: 1.010    Protein, Urine: 30 mg/dL    Urobilinogen: Negative    Nitrite: Negative    Leukocyte Esterase Concentration: Trace    < from: Xray Chest 1 View-PORTABLE IMMEDIATE (03.31.20 @ 09:50) >    EXAM:  XR CHEST PORTABLE IMMED 1V      PROCEDURE DATE:  03/31/2020        INTERPRETATION:  Views:1  Comparison: 5 days prior  History: Shortness of breath, cough and fever    The lungs are clear of infiltrates and effusions. There is mild cardiomegaly. The cardiac and mediastinal contours otherwise appear unremarkable.     Impression:  1. No evidence of acute pulmonary disease.      < end of copied text >    < from: TTE Echo Complete w/ Contrast w/ Doppler (03.27.20 @ 11:53) >    Summary:   1. Left ventricular ejection fraction, by visual estimation, is 50%.   2. Normal global left ventricular systolic function.   3. Increased LV wall thickness.   4. Spectral Doppler shows impaired relaxation pattern of left ventricular myocardial filling (Grade I diastolic dysfunction).   5. Myxomatous mitral valve.   6. Thickening and calcification of the anterior and posterior mitral valve leaflets.   7. Mild-moderate tricuspid regurgitation.   8. Mild aortic regurgitation.   9. Sclerotic aortic valve with normal opening.  10. Moderate pulmonic valve regurgitation.  11. Structurally normal pulmonic valve, with normal leaflet excursion.  12. Dilatation of the aortic root.  13. Estimated pulmonary artery systolic pressure is 59.0 mmHg assuming a right atrial pressure of 10 mmHg, which is consistent with moderate pulmonary hypertension.  14. LA volume Index is 35.3 ml/m² ml/m2.    < end of copied text >
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
## 2020-03-31 NOTE — H&P ADULT - ATTENDING COMMENTS
I have personally seen and examined patient on the above date.  I discussed the case with MACIEJ Cheema and I agree with findings and plan as detailed per note above, which I have amended where appropriate.    #Septic shock (lactate 6.6): Repeat COVID testing. CT chest r/o subclinical pneumonia if negative. Check blood cultures. Empiric tx for HCAP  #Acute diastolic CHF (EF 50%) : improved with IV lasix  #Moderate pulmonary HTN: could be contributing to SOB, IV lasix given x 1  #Moderate protein calorie malnutrition (old chart reviewed- nutrition note): prosource bid and parul bid per prior nutrition eval    Advanced care note reviewed: DNR/DNI I have personally seen and examined patient on the above date.  I discussed the case with MACIEJ Cheema and I agree with findings and plan as detailed per note above, which I have amended where appropriate.    #Septic shock (lactate 6.6): Repeat COVID testing. CT chest r/o subclinical pneumonia if negative. Check blood cultures. Empiric tx for HCAP  #Acute diastolic CHF (EF 50%) : improved with IV lasix  #Moderate pulmonary HTN: could be contributing to SOB, IV lasix given x 1  #Moderate protein calorie malnutrition (old chart reviewed- nutrition note): repeat nutrition eval, consider additional supplementation     Advanced care note reviewed: DNR/DNI

## 2020-03-31 NOTE — ED ADULT NURSE NOTE - OBJECTIVE STATEMENT
Pt arrived from home via EMS c/o SOB. Pt arrived on non rebreather and reports SOB and chest pain at time. Pt a&o x3. Pt denies any nausea, vomiting, cough or diarrhea.

## 2020-03-31 NOTE — H&P ADULT - NSTOBACCOSCREENHP_GEN_A_CS
Patient discharged to home in stable condition with all personal belongings, undelivered. Pt took a w/c to exit with sister escorting her. AVS reviewed, all questions answered at this time.   
No

## 2020-03-31 NOTE — ED ADULT TRIAGE NOTE - CHIEF COMPLAINT QUOTE
Pt presents to the ED from home with worsening shortness of breath and b/l wheezing. PMHX: CHF, Colostomy, Urinary retention. Pt presents to the ED from home with worsening shortness of breath and b/l wheezing. Pt on non-rebreather, pulse ox 80's at RA.  PMHX: CHF, Colostomy, Urinary retention. BIB EMS from home with worsening shortness of breath and b/l wheezing. Pt on non-rebreather, pulse ox 80's at RA.  PMHX: CHF, Colostomy, Urinary retention.

## 2020-03-31 NOTE — GOALS OF CARE CONVERSATION - ADVANCED CARE PLANNING - CONVERSATION DETAILS
Met with patient at bedside. He is A&Ox3, hard of hearing. Asked patient about his wishes for GOC. He stated that he does not want attempt of resuscitation/intubation in the event of cardiac arrest or cardiopulmonary decompensation. Pt. gave verbal consent for MOLST: DNR/I, limited medical intervention, send to hospital , no feeding tube, noninvasive ventilation trial, IV trial, use abx.
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
## 2020-04-01 LAB
ANION GAP SERPL CALC-SCNC: 5 MMOL/L — SIGNIFICANT CHANGE UP (ref 5–17)
BUN SERPL-MCNC: 14 MG/DL — SIGNIFICANT CHANGE UP (ref 7–23)
CALCIUM SERPL-MCNC: 8.1 MG/DL — LOW (ref 8.4–10.5)
CHLORIDE SERPL-SCNC: 103 MMOL/L — SIGNIFICANT CHANGE UP (ref 96–108)
CO2 SERPL-SCNC: 34 MMOL/L — HIGH (ref 22–31)
CREAT SERPL-MCNC: 0.56 MG/DL — SIGNIFICANT CHANGE UP (ref 0.5–1.3)
CULTURE RESULTS: NO GROWTH — SIGNIFICANT CHANGE UP
GLUCOSE BLDC GLUCOMTR-MCNC: 137 MG/DL — HIGH (ref 70–99)
GLUCOSE BLDC GLUCOMTR-MCNC: 138 MG/DL — HIGH (ref 70–99)
GLUCOSE BLDC GLUCOMTR-MCNC: 172 MG/DL — HIGH (ref 70–99)
GLUCOSE BLDC GLUCOMTR-MCNC: 173 MG/DL — HIGH (ref 70–99)
GLUCOSE SERPL-MCNC: 181 MG/DL — HIGH (ref 70–99)
HCT VFR BLD CALC: 33.4 % — LOW (ref 39–50)
HGB BLD-MCNC: 10.6 G/DL — LOW (ref 13–17)
MCHC RBC-ENTMCNC: 27.1 PG — SIGNIFICANT CHANGE UP (ref 27–34)
MCHC RBC-ENTMCNC: 31.7 GM/DL — LOW (ref 32–36)
MCV RBC AUTO: 85.4 FL — SIGNIFICANT CHANGE UP (ref 80–100)
NRBC # BLD: 0 /100 WBCS — SIGNIFICANT CHANGE UP (ref 0–0)
PLATELET # BLD AUTO: 212 K/UL — SIGNIFICANT CHANGE UP (ref 150–400)
POTASSIUM SERPL-MCNC: 4.1 MMOL/L — SIGNIFICANT CHANGE UP (ref 3.5–5.3)
POTASSIUM SERPL-SCNC: 4.1 MMOL/L — SIGNIFICANT CHANGE UP (ref 3.5–5.3)
RBC # BLD: 3.91 M/UL — LOW (ref 4.2–5.8)
RBC # FLD: 16 % — HIGH (ref 10.3–14.5)
SARS-COV-2 RNA SPEC QL NAA+PROBE: SIGNIFICANT CHANGE UP
SODIUM SERPL-SCNC: 142 MMOL/L — SIGNIFICANT CHANGE UP (ref 135–145)
SPECIMEN SOURCE: SIGNIFICANT CHANGE UP
WBC # BLD: 6.18 K/UL — SIGNIFICANT CHANGE UP (ref 3.8–10.5)
WBC # FLD AUTO: 6.18 K/UL — SIGNIFICANT CHANGE UP (ref 3.8–10.5)

## 2020-04-01 PROCEDURE — 99233 SBSQ HOSP IP/OBS HIGH 50: CPT

## 2020-04-01 PROCEDURE — 99223 1ST HOSP IP/OBS HIGH 75: CPT

## 2020-04-01 PROCEDURE — 99222 1ST HOSP IP/OBS MODERATE 55: CPT

## 2020-04-01 PROCEDURE — 74176 CT ABD & PELVIS W/O CONTRAST: CPT | Mod: 26

## 2020-04-01 RX ORDER — LANOLIN ALCOHOL/MO/W.PET/CERES
3 CREAM (GRAM) TOPICAL AT BEDTIME
Refills: 0 | Status: DISCONTINUED | OUTPATIENT
Start: 2020-04-01 | End: 2020-04-07

## 2020-04-01 RX ADMIN — Medication 1: at 08:03

## 2020-04-01 RX ADMIN — Medication 2 MILLIGRAM(S): at 17:10

## 2020-04-01 RX ADMIN — Medication 1: at 17:10

## 2020-04-01 RX ADMIN — BUMETANIDE 1 MILLIGRAM(S): 0.25 INJECTION INTRAMUSCULAR; INTRAVENOUS at 06:50

## 2020-04-01 RX ADMIN — Medication 2 MILLIGRAM(S): at 06:50

## 2020-04-01 RX ADMIN — Medication 100 MILLIGRAM(S): at 02:12

## 2020-04-01 RX ADMIN — Medication 100 MILLIGRAM(S): at 10:00

## 2020-04-01 RX ADMIN — LOSARTAN POTASSIUM 50 MILLIGRAM(S): 100 TABLET, FILM COATED ORAL at 08:03

## 2020-04-01 RX ADMIN — Medication 250 MILLIGRAM(S): at 17:10

## 2020-04-01 RX ADMIN — ENOXAPARIN SODIUM 40 MILLIGRAM(S): 100 INJECTION SUBCUTANEOUS at 12:39

## 2020-04-01 RX ADMIN — Medication 3 MILLIGRAM(S): at 22:50

## 2020-04-01 RX ADMIN — Medication 20 MILLIGRAM(S): at 21:50

## 2020-04-01 RX ADMIN — Medication 250 MILLIGRAM(S): at 06:50

## 2020-04-01 RX ADMIN — Medication 20 MILLIGRAM(S): at 06:50

## 2020-04-01 RX ADMIN — Medication 20 MILLIGRAM(S): at 12:39

## 2020-04-01 RX ADMIN — Medication 100 MILLIGRAM(S): at 21:00

## 2020-04-01 RX ADMIN — Medication 100 MILLIGRAM(S): at 14:57
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
## 2020-04-01 NOTE — PROGRESS NOTE ADULT - SUBJECTIVE AND OBJECTIVE BOX
Follow-up Pulm Progress Note    Dwaine Nugent MD  [phone]    No new  events overnight.  Denies SOB/CP.   Ct of abdomen revealed free air.  Surgical evaluation.  No surgical issues curretnly  Medications:  Vital Signs Last 24 Hrs  T(C): 37.7 (01 Apr 2020 08:53), Max: 37.7 (01 Apr 2020 08:53)  T(F): 99.8 (01 Apr 2020 08:53), Max: 99.8 (01 Apr 2020 08:53)  HR: 55 (31 Mar 2020 21:02) (54 - 55)  BP: 112/51 (01 Apr 2020 08:53) (95/62 - 113/82)  BP(mean): 77 (31 Mar 2020 21:02) (70 - 89)  RR: 15 (01 Apr 2020 08:53) (15 - 16)  SpO2: 98% (01 Apr 2020 08:53) (98% - 100%)              LABS:                        10.6   6.18  )-----------( 212      ( 01 Apr 2020 08:10 )             33.4     04-01    142  |  103  |  14  ----------------------------<  181<H>  4.1   |  34<H>  |  0.56    Ca    8.1<L>      01 Apr 2020 08:10    TPro  7.2  /  Alb  3.1<L>  /  TBili  0.4  /  DBili  x   /  AST  122<H>  /  ALT  62<H>  /  AlkPhos  264<H>  03-31      PT/INR - ( 31 Mar 2020 09:15 )   PT: 13.1 sec;   INR: 1.16 ratio         PTT - ( 31 Mar 2020 09:15 )  PTT:32.1 sec  Procalcitonin, Serum: 0.17 ng/mL (03-31-20 @ 09:15)    Serum Pro-Brain Natriuretic Peptide: 1784 pg/mL (03-31-20 @ 09:15)      CULTURES:  Culture Results:   No growth to date. (03-31 @ 09:15)  Culture Results:   No growth to date. (03-31 @ 09:15)  Culture Results:   No growth (03-31 @ 09:15)  Culture Results:   No growth (03-26 @ 10:30)  Culture Results:   No Growth Final (03-26 @ 10:05)  Culture Results:   No Growth Final (03-26 @ 10:05)    Most recent blood culture -- 03-31 @ 09:15   -- -- .Blood Blood-Peripheral 03-31 @ 09:15      Physical Examination:  as per hospitalist    RADIOLOGY REVIEWED  CXR:    CT chest:    TTE:  < from: CT Abdomen and Pelvis No Cont (04.01.20 @ 11:51) >  EXAM:  CT ABDOMEN AND PELVIS      PROCEDURE DATE:  04/01/2020        INTERPRETATION:  CLINICAL INFORMATION: Sepsis. Elevated liver function tests. Evaluate for abscess.    COMPARISON: CT scan of the abdomen pelvis dated 8/29/2019.    PROCEDURE:   CT of the Abdomen and Pelvis was performed without intravenous contrast.   Intravenous contrast: None.  Oral contrast: None.  Sagittal and coronal reformats were performed.    FINDINGS:    LOWER CHEST: Trace bilateral pleural effusions. Bibasilar atelectatic changes. Trace pericardial fluid. Coronary arterial calcifications.    LIVER: Within normal limits.  BILE DUCTS: Normal caliber.  GALLBLADDER: Within normal limits.  SPLEEN: Scattered calcified granulomata.  PANCREAS: Within normal limits.  ADRENALS: Within normal limits.  KIDNEYS/URETERS: Within normal limits.    BLADDER: Within normal limits.  REPRODUCTIVE ORGANS: Mild prostatic enlargement.    BOWEL: Diverting colostomy in the left abdomen. Status post Cuevas's procedure. No bowel related inflammation nor obstruction. No perforated bowel is identified. Appendix is not visualized. No evidence of inflammation in the pericecal region.  PERITONEUM: No ascites. No evidence of intra-abdominal abscess. Trace free air is identified in the right anterior mid abdomen (2:69 through 78).    VESSELS: Moderate atheromatous changes of the abdominal aorta and iliac arteries.  RETROPERITONEUM/LYMPH NODES: No lymphadenopathy.      ABDOMINAL WALL: There is skin and subcutaneous thickening in the soft tissues of the right buttock superficial to the right sacrum. Please correlate for inflammation/sacral decubitus ulcer. (2:108 through 113).    BONES: Postoperative fixation of a right femoral fracture. Osteoporosis. Moderate degenerative changes of spine. Loss of height of several mid and lower thoracic vertebral bodies.    IMPRESSION:   Trace free air is identified in the anterior abdomen.  No evidence of intra-abdominal abscess. No kim bowel related inflammation nor obstruction.  The patient is status post Cuevas's procedure and diverting colostomy in the left abdomen.  Suggest clinical correlation and repeat evaluation as clinically warranted.  Right sacral decubitus ulcer suspected.  These findings were discussed with Dr. Gianna rider conclusion of today's evaluation.    < end of copied text >

## 2020-04-01 NOTE — CONSULT NOTE ADULT - ASSESSMENT
a/p    no evidence of acute intraabdominal process, no indication for surgical intervention    benign abdomen      suggest:    iv fluids  dvt prophylaxis  daily labs    can put on po clear liquids      thank you    dr mahendra castillo  cell#[phone]
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
## 2020-04-02 LAB
ANION GAP SERPL CALC-SCNC: 7 MMOL/L — SIGNIFICANT CHANGE UP (ref 5–17)
BASOPHILS # BLD AUTO: 0.01 K/UL — SIGNIFICANT CHANGE UP (ref 0–0.2)
BASOPHILS NFR BLD AUTO: 0.2 % — SIGNIFICANT CHANGE UP (ref 0–2)
BUN SERPL-MCNC: 15 MG/DL — SIGNIFICANT CHANGE UP (ref 7–23)
CALCIUM SERPL-MCNC: 8 MG/DL — LOW (ref 8.4–10.5)
CHLORIDE SERPL-SCNC: 97 MMOL/L — SIGNIFICANT CHANGE UP (ref 96–108)
CO2 SERPL-SCNC: 34 MMOL/L — HIGH (ref 22–31)
CREAT SERPL-MCNC: 0.53 MG/DL — SIGNIFICANT CHANGE UP (ref 0.5–1.3)
EOSINOPHIL # BLD AUTO: 0.02 K/UL — SIGNIFICANT CHANGE UP (ref 0–0.5)
EOSINOPHIL NFR BLD AUTO: 0.3 % — SIGNIFICANT CHANGE UP (ref 0–6)
GLUCOSE BLDC GLUCOMTR-MCNC: 129 MG/DL — HIGH (ref 70–99)
GLUCOSE BLDC GLUCOMTR-MCNC: 145 MG/DL — HIGH (ref 70–99)
GLUCOSE BLDC GLUCOMTR-MCNC: 155 MG/DL — HIGH (ref 70–99)
GLUCOSE BLDC GLUCOMTR-MCNC: 174 MG/DL — HIGH (ref 70–99)
GLUCOSE BLDC GLUCOMTR-MCNC: 174 MG/DL — HIGH (ref 70–99)
GLUCOSE SERPL-MCNC: 142 MG/DL — HIGH (ref 70–99)
HCT VFR BLD CALC: 33.7 % — LOW (ref 39–50)
HGB BLD-MCNC: 10.9 G/DL — LOW (ref 13–17)
IMM GRANULOCYTES NFR BLD AUTO: 0.7 % — SIGNIFICANT CHANGE UP (ref 0–1.5)
LYMPHOCYTES # BLD AUTO: 0.35 K/UL — LOW (ref 1–3.3)
LYMPHOCYTES # BLD AUTO: 6 % — LOW (ref 13–44)
MCHC RBC-ENTMCNC: 27 PG — SIGNIFICANT CHANGE UP (ref 27–34)
MCHC RBC-ENTMCNC: 32.3 GM/DL — SIGNIFICANT CHANGE UP (ref 32–36)
MCV RBC AUTO: 83.6 FL — SIGNIFICANT CHANGE UP (ref 80–100)
MONOCYTES # BLD AUTO: 0.55 K/UL — SIGNIFICANT CHANGE UP (ref 0–0.9)
MONOCYTES NFR BLD AUTO: 9.4 % — SIGNIFICANT CHANGE UP (ref 2–14)
NEUTROPHILS # BLD AUTO: 4.88 K/UL — SIGNIFICANT CHANGE UP (ref 1.8–7.4)
NEUTROPHILS NFR BLD AUTO: 83.4 % — HIGH (ref 43–77)
NRBC # BLD: 0 /100 WBCS — SIGNIFICANT CHANGE UP (ref 0–0)
PLATELET # BLD AUTO: 221 K/UL — SIGNIFICANT CHANGE UP (ref 150–400)
POTASSIUM SERPL-MCNC: 2.8 MMOL/L — CRITICAL LOW (ref 3.5–5.3)
POTASSIUM SERPL-SCNC: 2.8 MMOL/L — CRITICAL LOW (ref 3.5–5.3)
RBC # BLD: 4.03 M/UL — LOW (ref 4.2–5.8)
RBC # FLD: 15.9 % — HIGH (ref 10.3–14.5)
SODIUM SERPL-SCNC: 138 MMOL/L — SIGNIFICANT CHANGE UP (ref 135–145)
VANCOMYCIN TROUGH SERPL-MCNC: 7.7 UG/ML — LOW (ref 10–20)
WBC # BLD: 5.85 K/UL — SIGNIFICANT CHANGE UP (ref 3.8–10.5)
WBC # FLD AUTO: 5.85 K/UL — SIGNIFICANT CHANGE UP (ref 3.8–10.5)

## 2020-04-02 PROCEDURE — 99233 SBSQ HOSP IP/OBS HIGH 50: CPT

## 2020-04-02 PROCEDURE — 99231 SBSQ HOSP IP/OBS SF/LOW 25: CPT

## 2020-04-02 PROCEDURE — 93970 EXTREMITY STUDY: CPT | Mod: 26

## 2020-04-02 RX ORDER — POTASSIUM CHLORIDE 20 MEQ
40 PACKET (EA) ORAL ONCE
Refills: 0 | Status: COMPLETED | OUTPATIENT
Start: 2020-04-02 | End: 2020-04-02

## 2020-04-02 RX ORDER — POTASSIUM CHLORIDE 20 MEQ
40 PACKET (EA) ORAL EVERY 4 HOURS
Refills: 0 | Status: DISCONTINUED | OUTPATIENT
Start: 2020-04-02 | End: 2020-04-02

## 2020-04-02 RX ADMIN — BUMETANIDE 1 MILLIGRAM(S): 0.25 INJECTION INTRAMUSCULAR; INTRAVENOUS at 05:04

## 2020-04-02 RX ADMIN — Medication 100 MILLIGRAM(S): at 22:49

## 2020-04-02 RX ADMIN — Medication 2 MILLIGRAM(S): at 05:04

## 2020-04-02 RX ADMIN — Medication 20 MILLIGRAM(S): at 14:13

## 2020-04-02 RX ADMIN — Medication 250 MILLIGRAM(S): at 13:02

## 2020-04-02 RX ADMIN — ENOXAPARIN SODIUM 40 MILLIGRAM(S): 100 INJECTION SUBCUTANEOUS at 11:39

## 2020-04-02 RX ADMIN — Medication 100 MILLIGRAM(S): at 05:07

## 2020-04-02 RX ADMIN — Medication 20 MILLIGRAM(S): at 22:49

## 2020-04-02 RX ADMIN — Medication 100 MILLIGRAM(S): at 14:13

## 2020-04-02 RX ADMIN — Medication 1: at 11:40

## 2020-04-02 RX ADMIN — LOSARTAN POTASSIUM 50 MILLIGRAM(S): 100 TABLET, FILM COATED ORAL at 05:04

## 2020-04-02 RX ADMIN — Medication 40 MILLIEQUIVALENT(S): at 12:57

## 2020-04-02 RX ADMIN — Medication 2 MILLIGRAM(S): at 18:35

## 2020-04-02 RX ADMIN — Medication 20 MILLIGRAM(S): at 05:04

## 2020-04-02 NOTE — PROGRESS NOTE ADULT - SUBJECTIVE AND OBJECTIVE BOX
Patient is a 79y old  Male who presents with a chief complaint of septic shock (2020 16:02)      Patient seen and examined at bedside.    ALLERGIES:  Bactrim (Rash)  Cipro (Unknown)  Levaquin (Unknown)  penicillin (Rash)  sulfa drugs (Unknown)    MEDICATIONS  (STANDING):  aztreonam  IVPB 2000 milliGRAM(s) IV Intermittent every 8 hours  baclofen 20 milliGRAM(s) Oral three times a day  buMETAnide 1 milliGRAM(s) Oral daily  dextrose 5%. 1000 milliLiter(s) (50 mL/Hr) IV Continuous <Continuous>  dextrose 50% Injectable 12.5 Gram(s) IV Push once  dextrose 50% Injectable 25 Gram(s) IV Push once  dextrose 50% Injectable 25 Gram(s) IV Push once  diazepam    Tablet 2 milliGRAM(s) Oral two times a day  enoxaparin Injectable 40 milliGRAM(s) SubCutaneous daily  insulin lispro (HumaLOG) corrective regimen sliding scale   SubCutaneous three times a day before meals  losartan 50 milliGRAM(s) Oral daily  vancomycin  IVPB      vancomycin  IVPB 1000 milliGRAM(s) IV Intermittent every 12 hours    MEDICATIONS  (PRN):  dextrose 40% Gel 15 Gram(s) Oral once PRN Blood Glucose LESS THAN 70 milliGRAM(s)/deciliter  glucagon  Injectable 1 milliGRAM(s) IntraMuscular once PRN Glucose LESS THAN 70 milligrams/deciliter  melatonin 3 milliGRAM(s) Oral at bedtime PRN Insomnia    Vital Signs Last 24 Hrs  T(F): 98 (2020 05:06), Max: 99.8 (2020 08:53)  HR: 58 (2020 05:06) (58 - 58)  BP: 137/56 (2020 05:06) (112/51 - 146/59)  RR: 15 (2020 05:06) (15 - 16)  SpO2: 97% (2020 05:06) (97% - 98%)  I&O's Summary    2020 07:01  -  2020 07:00  --------------------------------------------------------  IN: 100 mL / OUT: 0 mL / NET: 100 mL      BMI (kg/m2): 20.5 (20 @ 10:53)  PHYSICAL EXAM:  General: NAD, A/O x 3  ENT: MMM  Neck: Supple, No JVD  Lungs: Clear to auscultation bilaterally  Cardio: RRR, S1/S2, No murmurs  Abdomen: Soft, Nontender, Nondistended; Bowel sounds present  Extremities: No calf tenderness, No pitting edema    LABS:                        10.6   6.18  )-----------( 212      ( 2020 08:10 )             33.4           142  |  103  |  14  ----------------------------<  181  4.1   |  34  |  0.56    Ca    8.1      2020 08:10    TPro  7.2  /  Alb  3.1  /  TBili  0.4  /  DBili  x   /  AST  122  /  ALT  62  /  AlkPhos  264       eGFR if Non African American: 97 mL/min/1.73M2 (20 @ 08:10)  eGFR if : 113 mL/min/1.73M2 (20 @ 08:10)    PT/INR - ( 31 Mar 2020 09:15 )   PT: 13.1 sec;   INR: 1.16 ratio         PTT - ( 31 Mar 2020 09:15 )  PTT:32.1 sec   Lactate, Blood: 1.3 mmol/L ( @ 12:42)  Lactate, Blood: 6.3 mmol/L ( @ 09:53)    CARDIAC MARKERS ( 31 Mar 2020 09:15 )  .040 ng/mL / x     / 79 U/L / x     / x                        POCT Blood Glucose.: 129 mg/dL (2020 08:21)  POCT Blood Glucose.: 172 mg/dL (2020 21:12)  POCT Blood Glucose.: 173 mg/dL (2020 16:37)  POCT Blood Glucose.: 137 mg/dL (2020 12:04)     [id number] 6.4    Urinalysis Basic - ( 31 Mar 2020 09:15 )    Color: Kita / Appearance: Slightly Turbid / S.010 / pH: x  Gluc: x / Ketone: Negative  / Bili: Negative / Urobili: Negative   Blood: x / Protein: 30 mg/dL / Nitrite: Negative   Leuk Esterase: Trace / RBC: >50 /HPF / WBC 0-2 /HPF   Sq Epi: x / Non Sq Epi: Neg.-Few / Bacteria: Trace /HPF    Culture - Urine (collected 31 Mar 2020 09:15)  Source: .Urine Clean Catch (Midstream)  Final Report (2020 08:38):    No growth    Culture - Blood (collected 31 Mar 2020 09:15)  Source: .Blood Blood-Peripheral  Preliminary Report (2020 13:02):    No growth to date.    Culture - Blood (collected 31 Mar 2020 09:15)  Source: .Blood Blood-Peripheral  Preliminary Report (2020 13:02):    No growth to date.    Culture - Urine (collected 26 Mar 2020 10:30)  Source: .Urine Clean Catch (Midstream)  Final Report (27 Mar 2020 10:02):    No growth    Culture - Blood (collected 26 Mar 2020 10:05)  Source: .Blood Blood-Peripheral  Final Report (30 Mar 2020 16:01):    No Growth Final    Culture - Blood (collected 26 Mar 2020 10:05)  Source: .Blood Blood-Peripheral  Final Report (30 Mar 2020 16:01):    No Growth Final    RADIOLOGY & ADDITIONAL TESTS:    Care Discussed with Consultants/Other Providers:
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
## 2020-04-02 NOTE — PROGRESS NOTE ADULT - SUBJECTIVE AND OBJECTIVE BOX
Follow up for  SUBJ:    comfortable in hallway ER. feels cold    PMH  Macular degeneration  Hard of hearing  Diabetes  Paraplegia      MEDICATIONS  (STANDING):  aztreonam  IVPB 2000 milliGRAM(s) IV Intermittent every 8 hours  baclofen 20 milliGRAM(s) Oral three times a day  buMETAnide 1 milliGRAM(s) Oral daily  dextrose 5%. 1000 milliLiter(s) (50 mL/Hr) IV Continuous <Continuous>  dextrose 50% Injectable 12.5 Gram(s) IV Push once  dextrose 50% Injectable 25 Gram(s) IV Push once  dextrose 50% Injectable 25 Gram(s) IV Push once  diazepam    Tablet 2 milliGRAM(s) Oral two times a day  enoxaparin Injectable 40 milliGRAM(s) SubCutaneous daily  insulin lispro (HumaLOG) corrective regimen sliding scale   SubCutaneous three times a day before meals  losartan 50 milliGRAM(s) Oral daily  vancomycin  IVPB      vancomycin  IVPB 1000 milliGRAM(s) IV Intermittent every 12 hours    MEDICATIONS  (PRN):  dextrose 40% Gel 15 Gram(s) Oral once PRN Blood Glucose LESS THAN 70 milliGRAM(s)/deciliter  glucagon  Injectable 1 milliGRAM(s) IntraMuscular once PRN Glucose LESS THAN 70 milligrams/deciliter  melatonin 3 milliGRAM(s) Oral at bedtime PRN Insomnia        PHYSICAL EXAM:  Vital Signs Last 24 Hrs  T(C): 36.8 (02 Apr 2020 11:14), Max: 37.4 (01 Apr 2020 20:00)  T(F): 98.2 (02 Apr 2020 11:14), Max: 99.4 (01 Apr 2020 20:00)  HR: 58 (02 Apr 2020 11:14) (58 - 64)  BP: 103/63 (02 Apr 2020 11:14) (103/63 - 146/59)  BP(mean): --  RR: 16 (02 Apr 2020 11:14) (15 - 16)  SpO2: 96% (02 Apr 2020 11:14) (89% - 97%)    GENERAL: NAD,elderly gentleman  HEAD:  Atraumatic, Normocephalic  EYES: EOMI, PERRLA, conjunctiva and sclera clear  ENT: Moist mucous membranes,  NECK: Supple, No JVD, no bruits  CHEST/LUNG: Clear to percussion bilaterally; No rales, rhonchi, wheezing, or rubs  HEART: Regular rate and rhythm; No murmurs, rubs, or gallops PMI non displaced.  ABDOMEN: Soft, Nontender, Nondistended; Bowel sounds present  EXTREMITIES:  2+ Peripheral Pulses, No clubbing, cyanosis, or edema  SKIN: No rashes or lesions  NERVOUS SYSTEM:  Cranial Nerves II-XII intact      TELEMETRY:        ECG:    < from: 12 Lead ECG (03.31.20 @ 09:21) >  Diagnosis Line Sinus rhythm with occasional premature ventricular complexes and fusion complexes  Left anterior fascicular block  Septal infarct , age undetermined  Prolonged QT  Abnormal ECG  When compared with ECG of 27-MAR-2020 09:25,  fusion complexes are now present  premature ventricular complexes are now present  Vent. rate has increased BY  50 BPM  Septal infarct is now present    Confirmed by ARIA ALEMAN MD ([id number]) on 3/31/2020 9:30:59 AM    < end of copied text >    ECHO:    < from: TTE Echo Complete w/ Contrast w/ Doppler (03.27.20 @ 11:53) >  Summary:   1. Left ventricular ejection fraction, by visual estimation, is 50%.   2. Normal global left ventricular systolic function.   3. Increased LV wall thickness.   4. Spectral Doppler shows impaired relaxation pattern of left ventricular myocardial filling (Grade I diastolic dysfunction).   5. Myxomatous mitral valve.   6. Thickening and calcification of the anterior and posterior mitral valve leaflets.   7. Mild-moderate tricuspid regurgitation.   8. Mild aortic regurgitation.   9. Sclerotic aortic valve with normal opening.  10. Moderate pulmonic valve regurgitation.  11. Structurally normal pulmonic valve, with normal leaflet excursion.  12. Dilatation of the aortic root.  13. Estimated pulmonary artery systolic pressure is 59.0 mmHg assuming a right atrial pressure of 10 mmHg, which is consistent with moderate pulmonary hypertension.  14. LA volume Index is 35.3 ml/m² ml/m2.    [id number] Sherif Brown MD,[facility] , Electronically signed on 3/27/2020 at 1:15:36 PM      *** Final ***    SHERIF BROWN M.D., ATTENDING CARDIOLOGIST  This document has been electronically signed. Mar 27 2020 11:53AM    < end of copied text >      LABS:                        10.9   5.85  )-----------( 221      ( 02 Apr 2020 10:16 )             33.7     04-02    138  |  97  |  15  ----------------------------<  142<H>  2.8<LL>   |  34<H>  |  0.53    Ca    8.0<L>      02 Apr 2020 10:16      I&O's Summary    01 Apr 2020 07:01  -  02 Apr 2020 07:00  --------------------------------------------------------  IN: 100 mL / OUT: 0 mL / NET: 100 mL      BNP    RADIOLOGY & ADDITIONAL STUDIES:    < from: Xray Chest 1 View-PORTABLE IMMEDIATE (03.31.20 @ 09:50) >    Impression:  1. No evidence of acute pulmonary disease.          UMA EVANGELISTA M.D., ATTENDING RADIOLOGIST  This document has been electronically signed. Mar 31 2020  9:59AM    < end of copied text >          ECHO:

## 2020-04-03 LAB
ANION GAP SERPL CALC-SCNC: 5 MMOL/L — SIGNIFICANT CHANGE UP (ref 5–17)
BUN SERPL-MCNC: 15 MG/DL — SIGNIFICANT CHANGE UP (ref 7–23)
CALCIUM SERPL-MCNC: 8.3 MG/DL — LOW (ref 8.4–10.5)
CHLORIDE SERPL-SCNC: 99 MMOL/L — SIGNIFICANT CHANGE UP (ref 96–108)
CO2 SERPL-SCNC: 30 MMOL/L — SIGNIFICANT CHANGE UP (ref 22–31)
CREAT SERPL-MCNC: 0.46 MG/DL — LOW (ref 0.5–1.3)
GLUCOSE BLDC GLUCOMTR-MCNC: 113 MG/DL — HIGH (ref 70–99)
GLUCOSE BLDC GLUCOMTR-MCNC: 153 MG/DL — HIGH (ref 70–99)
GLUCOSE SERPL-MCNC: 149 MG/DL — HIGH (ref 70–99)
HCT VFR BLD CALC: 33.3 % — LOW (ref 39–50)
HGB BLD-MCNC: 10.5 G/DL — LOW (ref 13–17)
MCHC RBC-ENTMCNC: 26.8 PG — LOW (ref 27–34)
MCHC RBC-ENTMCNC: 31.5 GM/DL — LOW (ref 32–36)
MCV RBC AUTO: 84.9 FL — SIGNIFICANT CHANGE UP (ref 80–100)
NRBC # BLD: 0 /100 WBCS — SIGNIFICANT CHANGE UP (ref 0–0)
PLATELET # BLD AUTO: 191 K/UL — SIGNIFICANT CHANGE UP (ref 150–400)
POTASSIUM SERPL-MCNC: 4.5 MMOL/L — SIGNIFICANT CHANGE UP (ref 3.5–5.3)
POTASSIUM SERPL-SCNC: 4.5 MMOL/L — SIGNIFICANT CHANGE UP (ref 3.5–5.3)
RBC # BLD: 3.92 M/UL — LOW (ref 4.2–5.8)
RBC # FLD: 15.9 % — HIGH (ref 10.3–14.5)
SODIUM SERPL-SCNC: 134 MMOL/L — LOW (ref 135–145)
WBC # BLD: 5.34 K/UL — SIGNIFICANT CHANGE UP (ref 3.8–10.5)
WBC # FLD AUTO: 5.34 K/UL — SIGNIFICANT CHANGE UP (ref 3.8–10.5)

## 2020-04-03 RX ORDER — SODIUM HYPOCHLORITE 0.125 %
1 SOLUTION, NON-ORAL MISCELLANEOUS DAILY
Refills: 0 | Status: DISCONTINUED | OUTPATIENT
Start: 2020-04-03 | End: 2020-04-07

## 2020-04-03 RX ORDER — COLLAGENASE CLOSTRIDIUM HIST. 250 UNIT/G
1 OINTMENT (GRAM) TOPICAL DAILY
Refills: 0 | Status: DISCONTINUED | OUTPATIENT
Start: 2020-04-03 | End: 2020-04-07

## 2020-04-03 RX ADMIN — Medication 250 MILLIGRAM(S): at 02:51

## 2020-04-03 RX ADMIN — Medication 1: at 12:26

## 2020-04-03 RX ADMIN — Medication 20 MILLIGRAM(S): at 06:13

## 2020-04-03 RX ADMIN — Medication 20 MILLIGRAM(S): at 13:34

## 2020-04-03 RX ADMIN — Medication 250 MILLIGRAM(S): at 18:45

## 2020-04-03 RX ADMIN — Medication 250 MILLIGRAM(S): at 17:12

## 2020-04-03 RX ADMIN — Medication 2 MILLIGRAM(S): at 06:13

## 2020-04-03 RX ADMIN — ENOXAPARIN SODIUM 40 MILLIGRAM(S): 100 INJECTION SUBCUTANEOUS at 13:29

## 2020-04-03 RX ADMIN — BUMETANIDE 1 MILLIGRAM(S): 0.25 INJECTION INTRAMUSCULAR; INTRAVENOUS at 06:13

## 2020-04-03 RX ADMIN — Medication 100 MILLIGRAM(S): at 22:03

## 2020-04-03 RX ADMIN — Medication 100 MILLIGRAM(S): at 06:14

## 2020-04-03 RX ADMIN — Medication 100 MILLIGRAM(S): at 13:29

## 2020-04-03 RX ADMIN — LOSARTAN POTASSIUM 50 MILLIGRAM(S): 100 TABLET, FILM COATED ORAL at 06:13

## 2020-04-03 RX ADMIN — Medication 3 MILLIGRAM(S): at 22:02

## 2020-04-03 RX ADMIN — Medication 20 MILLIGRAM(S): at 22:02

## 2020-04-03 RX ADMIN — Medication 2 MILLIGRAM(S): at 17:21

## 2020-04-03 RX ADMIN — Medication 1: at 08:15
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
## 2020-04-03 NOTE — DIETITIAN INITIAL EVALUATION ADULT. - PHYSICAL APPEARANCE
other (specify)/NFPE: pt with evidence of severe muscle wasting (temporal, clavicle, scapular, shoulders) and subcutaneous fat loss (orbital, buccal) Height: 6'0", Weight: (4/3) 172.8lbs, BMI: 23.4  Skin: chronic sacral decubiti, Edema: none  IBW range: 160-196lbs
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
## 2020-04-03 NOTE — DIETITIAN INITIAL EVALUATION ADULT. - ADD RECOMMEND
Trend weights, labs & PO intake. Provide assistance with meals as needed- would suggest adding prosource to hot cereal to provide fortified breakfast.
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
## 2020-04-03 NOTE — DIETITIAN INITIAL EVALUATION ADULT. - ETIOLOGY
related to inability to meet estimated nutritional needs in setting of increased needs with chronic decubiti
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
## 2020-04-03 NOTE — DIETITIAN INITIAL EVALUATION ADULT. - CONTINUE CURRENT NUTRITION CARE PLAN
Consistent carbohydrate. No caffeine. (remain liberalized with Na given hyponatremia). Add Segun BID + Prosource BID

## 2020-04-03 NOTE — CHART NOTE - NSCHARTNOTEFT_GEN_A_CORE
Upon Nutritional Assessment by the Registered Dietitian your patient was determined to meet criteria / has evidence of the following diagnosis/diagnoses:          [ ]  Mild Protein Calorie Malnutrition        [ ]  Moderate Protein Calorie Malnutrition        [X] Severe Protein Calorie Malnutrition        [ ] Unspecified Protein Calorie Malnutrition        [ ] Underweight / BMI <19        [ ] Morbid Obesity / BMI > 40      Findings as based on:  [X] Comprehensive nutrition assessment   [X] Nutrition Focused Physical Exam: severe muscle wasting (temporal, clavicle, shoulders, scapular) + subcutaneous fat loss (orbital, buccal)  [ ] Other:       Nutrition Plan/Recommendations:    1. Add Segun BID, prosource BID       PROVIDER Section:     By signing this assessment you are acknowledging and agree with the diagnosis/diagnoses assigned by the Registered Dietitian    Comments:

## 2020-04-04 LAB
CULTURE RESULTS: SIGNIFICANT CHANGE UP
CULTURE RESULTS: SIGNIFICANT CHANGE UP
GLUCOSE BLDC GLUCOMTR-MCNC: 104 MG/DL — HIGH (ref 70–99)
GLUCOSE BLDC GLUCOMTR-MCNC: 110 MG/DL — HIGH (ref 70–99)
GLUCOSE BLDC GLUCOMTR-MCNC: 117 MG/DL — HIGH (ref 70–99)
GLUCOSE BLDC GLUCOMTR-MCNC: 210 MG/DL — HIGH (ref 70–99)
HCT VFR BLD CALC: 32.1 % — LOW (ref 39–50)
HGB BLD-MCNC: 10.5 G/DL — LOW (ref 13–17)
MCHC RBC-ENTMCNC: 27.2 PG — SIGNIFICANT CHANGE UP (ref 27–34)
MCHC RBC-ENTMCNC: 32.7 GM/DL — SIGNIFICANT CHANGE UP (ref 32–36)
MCV RBC AUTO: 83.2 FL — SIGNIFICANT CHANGE UP (ref 80–100)
NRBC # BLD: 0 /100 WBCS — SIGNIFICANT CHANGE UP (ref 0–0)
PLATELET # BLD AUTO: 196 K/UL — SIGNIFICANT CHANGE UP (ref 150–400)
PREALB SERPL-MCNC: 5 MG/DL — LOW (ref 20–40)
RBC # BLD: 3.86 M/UL — LOW (ref 4.2–5.8)
RBC # FLD: 15.6 % — HIGH (ref 10.3–14.5)
SPECIMEN SOURCE: SIGNIFICANT CHANGE UP
SPECIMEN SOURCE: SIGNIFICANT CHANGE UP
WBC # BLD: 5.16 K/UL — SIGNIFICANT CHANGE UP (ref 3.8–10.5)
WBC # FLD AUTO: 5.16 K/UL — SIGNIFICANT CHANGE UP (ref 3.8–10.5)

## 2020-04-04 PROCEDURE — 99232 SBSQ HOSP IP/OBS MODERATE 35: CPT | Mod: GC

## 2020-04-04 RX ADMIN — LOSARTAN POTASSIUM 50 MILLIGRAM(S): 100 TABLET, FILM COATED ORAL at 05:56

## 2020-04-04 RX ADMIN — Medication 2 MILLIGRAM(S): at 06:02

## 2020-04-04 RX ADMIN — Medication 1 APPLICATION(S): at 12:12

## 2020-04-04 RX ADMIN — BUMETANIDE 1 MILLIGRAM(S): 0.25 INJECTION INTRAMUSCULAR; INTRAVENOUS at 05:55

## 2020-04-04 RX ADMIN — Medication 250 MILLIGRAM(S): at 06:00

## 2020-04-04 RX ADMIN — Medication 20 MILLIGRAM(S): at 21:04

## 2020-04-04 RX ADMIN — Medication 2 MILLIGRAM(S): at 17:43

## 2020-04-04 RX ADMIN — ENOXAPARIN SODIUM 40 MILLIGRAM(S): 100 INJECTION SUBCUTANEOUS at 14:46

## 2020-04-04 RX ADMIN — Medication 100 MILLIGRAM(S): at 05:55

## 2020-04-04 RX ADMIN — Medication 2: at 08:23

## 2020-04-04 RX ADMIN — Medication 20 MILLIGRAM(S): at 05:55

## 2020-04-04 RX ADMIN — Medication 20 MILLIGRAM(S): at 14:46

## 2020-04-04 NOTE — CHART NOTE - NSCHARTNOTEFT_GEN_A_CORE
Notified by RN that patient occasionally choking prior to swallowing. Patient seems to have difficulty chewing. Will order speech and swallow consult.
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
## 2020-04-04 NOTE — PROGRESS NOTE ADULT - SUBJECTIVE AND OBJECTIVE BOX
CC: f/u for "sepsis"    Patient reports: he is comfortable, no specific complaints    REVIEW OF SYSTEMS:  All other review of systems negative (Comprehensive ROS): lower extremity paralysis    Antimicrobials Day #  :day 5  aztreonam  IVPB 2000 milliGRAM(s) IV Intermittent every 8 hours  vancomycin  IVPB      vancomycin  IVPB 1000 milliGRAM(s) IV Intermittent every 12 hours    Other Medications Reviewed    T(F): 97.8 (04-03-20 @ 21:00), Max: 98.7 (04-03-20 @ 16:43)  HR: 68 (04-03-20 @ 21:00)  BP: 154/70 (04-03-20 @ 21:00)  RR: 15 (04-03-20 @ 21:00)  SpO2: 93% (04-03-20 @ 21:00)  Wt(kg): --    PHYSICAL EXAM:  General: alert, no acute distress  Eyes:  anicteric, no conjunctival injection, no discharge  Oropharynx: no lesions or injection 	  Neck: supple, without adenopathy  Lungs: clear to auscultation  Heart: regular rate and rhythm; no murmur, rubs or gallops  Abdomen: soft, nondistended, nontender, without mass or organomegaly  Skin: no lesions  Extremities: no clubbing, cyanosis, or edema  Neurologic: alert, oriented, lower extremity paralysis    LAB RESULTS:                        10.5   5.34  )-----------( 191      ( 03 Apr 2020 07:16 )             33.3     04-03    134<L>  |  99  |  15  ----------------------------<  149<H>  4.5   |  30  |  0.46<L>    Ca    8.3<L>      03 Apr 2020 07:16          MICROBIOLOGY:  RECENT CULTURES:  03-31 @ 09:15 .Blood Blood-Peripheral     No growth to date.          RADIOLOGY REVIEWED:    < from: CT Abdomen and Pelvis No Cont (04.01.20 @ 11:51) >  IMPRESSION:   Trace free air is identified in the anterior abdomen.  No evidence of intra-abdominal abscess. No kim bowel related inflammation nor obstruction.  The patient is status post Cuevas's procedure and diverting colostomy in the left abdomen.  Suggest clinical correlation and repeat evaluation as clinically warranted.  Right sacral decubitus ulcer suspected.  These findings were discussed with Dr. Katz atthe conclusion of today's evaluation.    < end of copied text >
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
## 2020-04-04 NOTE — PROGRESS NOTE ADULT - ASSESSMENT
78 yo male admitted with low BP and elevated lactate.  He has been on aztreonam and vanco, ID w.u has been negative, no clear respiratory, GI or  infection.  Local wound care may be treatment for pressure wounds.  LE dopplers negative for DVT.He is afebrile with normal BP and normal wbc.  Suggest:  1.Stop antibiotics  2.local wound care per wound care service  3.monitor off antibiotics, disposition per primary service

## 2020-04-05 LAB
GLUCOSE BLDC GLUCOMTR-MCNC: 108 MG/DL — HIGH (ref 70–99)
GLUCOSE BLDC GLUCOMTR-MCNC: 82 MG/DL — SIGNIFICANT CHANGE UP (ref 70–99)
GLUCOSE BLDC GLUCOMTR-MCNC: 84 MG/DL — SIGNIFICANT CHANGE UP (ref 70–99)
GLUCOSE BLDC GLUCOMTR-MCNC: 91 MG/DL — SIGNIFICANT CHANGE UP (ref 70–99)
HCT VFR BLD CALC: 42.9 % — SIGNIFICANT CHANGE UP (ref 39–50)
HGB BLD-MCNC: 13.5 G/DL — SIGNIFICANT CHANGE UP (ref 13–17)
MCHC RBC-ENTMCNC: 27.4 PG — SIGNIFICANT CHANGE UP (ref 27–34)
MCHC RBC-ENTMCNC: 31.5 GM/DL — LOW (ref 32–36)
MCV RBC AUTO: 87 FL — SIGNIFICANT CHANGE UP (ref 80–100)
NRBC # BLD: 0 /100 WBCS — SIGNIFICANT CHANGE UP (ref 0–0)
PLATELET # BLD AUTO: 215 K/UL — SIGNIFICANT CHANGE UP (ref 150–400)
RBC # BLD: 4.93 M/UL — SIGNIFICANT CHANGE UP (ref 4.2–5.8)
RBC # FLD: 15.9 % — HIGH (ref 10.3–14.5)
WBC # BLD: 6.11 K/UL — SIGNIFICANT CHANGE UP (ref 3.8–10.5)
WBC # FLD AUTO: 6.11 K/UL — SIGNIFICANT CHANGE UP (ref 3.8–10.5)

## 2020-04-05 PROCEDURE — 99232 SBSQ HOSP IP/OBS MODERATE 35: CPT | Mod: GC

## 2020-04-05 RX ORDER — DEXTROSE 50 % IN WATER 50 %
25 SYRINGE (ML) INTRAVENOUS ONCE
Refills: 0 | Status: DISCONTINUED | OUTPATIENT
Start: 2020-04-05 | End: 2020-04-07

## 2020-04-05 RX ORDER — DEXTROSE 50 % IN WATER 50 %
15 SYRINGE (ML) INTRAVENOUS ONCE
Refills: 0 | Status: DISCONTINUED | OUTPATIENT
Start: 2020-04-05 | End: 2020-04-07

## 2020-04-05 RX ORDER — DEXTROSE 50 % IN WATER 50 %
12.5 SYRINGE (ML) INTRAVENOUS ONCE
Refills: 0 | Status: DISCONTINUED | OUTPATIENT
Start: 2020-04-05 | End: 2020-04-07

## 2020-04-05 RX ORDER — SODIUM CHLORIDE 9 MG/ML
1000 INJECTION, SOLUTION INTRAVENOUS
Refills: 0 | Status: DISCONTINUED | OUTPATIENT
Start: 2020-04-05 | End: 2020-04-07

## 2020-04-05 RX ORDER — GLUCAGON INJECTION, SOLUTION 0.5 MG/.1ML
1 INJECTION, SOLUTION SUBCUTANEOUS ONCE
Refills: 0 | Status: DISCONTINUED | OUTPATIENT
Start: 2020-04-05 | End: 2020-04-07

## 2020-04-05 RX ORDER — INSULIN LISPRO 100/ML
VIAL (ML) SUBCUTANEOUS EVERY 6 HOURS
Refills: 0 | Status: DISCONTINUED | OUTPATIENT
Start: 2020-04-05 | End: 2020-04-07

## 2020-04-05 RX ADMIN — Medication 20 MILLIGRAM(S): at 20:36

## 2020-04-05 RX ADMIN — Medication 20 MILLIGRAM(S): at 06:23

## 2020-04-05 RX ADMIN — Medication 1 APPLICATION(S): at 13:33

## 2020-04-05 RX ADMIN — BUMETANIDE 1 MILLIGRAM(S): 0.25 INJECTION INTRAMUSCULAR; INTRAVENOUS at 06:24

## 2020-04-05 RX ADMIN — Medication 2 MILLIGRAM(S): at 17:20

## 2020-04-05 RX ADMIN — Medication 20 MILLIGRAM(S): at 13:32

## 2020-04-05 RX ADMIN — LOSARTAN POTASSIUM 50 MILLIGRAM(S): 100 TABLET, FILM COATED ORAL at 06:23

## 2020-04-05 RX ADMIN — ENOXAPARIN SODIUM 40 MILLIGRAM(S): 100 INJECTION SUBCUTANEOUS at 13:32

## 2020-04-05 RX ADMIN — Medication 2 MILLIGRAM(S): at 06:23

## 2020-04-05 NOTE — PROGRESS NOTE ADULT - SUBJECTIVE AND OBJECTIVE BOX
CC: f/u for "sepsis"    Patient reports: he is sleepy, appears to be in no distress.Episodes of coughing after eating noted.    REVIEW OF SYSTEMS:  All other review of systems negative (Comprehensive ROS): paraplegic    Antimicrobials Day #  :off    Other Medications Reviewed    T(F): 98 (04-04-20 @ 21:13), Max: 98 (04-04-20 @ 06:42)  HR: 69 (04-04-20 @ 21:13)  BP: 138/69 (04-04-20 @ 21:13)  RR: 17 (04-04-20 @ 21:13)  SpO2: 91% (04-04-20 @ 21:13)  Wt(kg): --    PHYSICAL EXAM:  General: alert, no acute distress  Eyes:  anicteric, no conjunctival injection, no discharge  Oropharynx: no lesions or injection 	  Neck: supple, without adenopathy  Lungs: clear to auscultation, decreased at bases  Heart: regular rate and rhythm; no murmur, rubs or gallops  Abdomen: soft, nondistended, nontender,   Skin: no lesions  Extremities: no clubbing, cyanosis, or edema  Neurologic: alert, oriented, paraplegia    LAB RESULTS:                        10.5   5.16  )-----------( 196      ( 04 Apr 2020 14:50 )             32.1     04-03    134<L>  |  99  |  15  ----------------------------<  149<H>  4.5   |  30  |  0.46<L>    Ca    8.3<L>      03 Apr 2020 07:16          MICROBIOLOGY:  RECENT CULTURES:  03-31 @ 09:15 .Blood Blood-Peripheral     No Growth Final          RADIOLOGY REVIEWED:
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
## 2020-04-05 NOTE — PROGRESS NOTE ADULT - ASSESSMENT
80 yo male admitted with low BP and elevated lactate.  He had been on aztreonam and vanco,coverage for possible sepsis, ID w.u has been negative, no clear respiratory, GI or  infection.  Local wound care may be treatment for pressure wounds.  LE dopplers negative for DVT.He is afebrile with normal BP and normal wbc.  Vanco/aztreonam stopped 4/4  Suggest:  1.monitor off antibiotics, aspiration precautions  2.local wound care per wound care service  3. disposition per primary service

## 2020-04-06 LAB
ANION GAP SERPL CALC-SCNC: 12 MMOL/L — SIGNIFICANT CHANGE UP (ref 5–17)
BUN SERPL-MCNC: 14 MG/DL — SIGNIFICANT CHANGE UP (ref 7–23)
CALCIUM SERPL-MCNC: 8.6 MG/DL — SIGNIFICANT CHANGE UP (ref 8.4–10.5)
CHLORIDE SERPL-SCNC: 96 MMOL/L — SIGNIFICANT CHANGE UP (ref 96–108)
CO2 SERPL-SCNC: 32 MMOL/L — HIGH (ref 22–31)
CREAT SERPL-MCNC: 0.58 MG/DL — SIGNIFICANT CHANGE UP (ref 0.5–1.3)
GLUCOSE BLDC GLUCOMTR-MCNC: 108 MG/DL — HIGH (ref 70–99)
GLUCOSE BLDC GLUCOMTR-MCNC: 157 MG/DL — HIGH (ref 70–99)
GLUCOSE BLDC GLUCOMTR-MCNC: 162 MG/DL — HIGH (ref 70–99)
GLUCOSE SERPL-MCNC: 106 MG/DL — HIGH (ref 70–99)
HCT VFR BLD CALC: 40.9 % — SIGNIFICANT CHANGE UP (ref 39–50)
HGB BLD-MCNC: 12.9 G/DL — LOW (ref 13–17)
MCHC RBC-ENTMCNC: 26.5 PG — LOW (ref 27–34)
MCHC RBC-ENTMCNC: 31.5 GM/DL — LOW (ref 32–36)
MCV RBC AUTO: 84 FL — SIGNIFICANT CHANGE UP (ref 80–100)
NRBC # BLD: 0 /100 WBCS — SIGNIFICANT CHANGE UP (ref 0–0)
PLATELET # BLD AUTO: 253 K/UL — SIGNIFICANT CHANGE UP (ref 150–400)
POTASSIUM SERPL-MCNC: 3.4 MMOL/L — LOW (ref 3.5–5.3)
POTASSIUM SERPL-SCNC: 3.4 MMOL/L — LOW (ref 3.5–5.3)
RBC # BLD: 4.87 M/UL — SIGNIFICANT CHANGE UP (ref 4.2–5.8)
RBC # FLD: 15.6 % — HIGH (ref 10.3–14.5)
SODIUM SERPL-SCNC: 140 MMOL/L — SIGNIFICANT CHANGE UP (ref 135–145)
WBC # BLD: 6.72 K/UL — SIGNIFICANT CHANGE UP (ref 3.8–10.5)
WBC # FLD AUTO: 6.72 K/UL — SIGNIFICANT CHANGE UP (ref 3.8–10.5)

## 2020-04-06 PROCEDURE — 99232 SBSQ HOSP IP/OBS MODERATE 35: CPT | Mod: GC

## 2020-04-06 RX ADMIN — Medication 2 MILLIGRAM(S): at 05:54

## 2020-04-06 RX ADMIN — Medication 1 APPLICATION(S): at 11:32

## 2020-04-06 RX ADMIN — Medication 1: at 12:20

## 2020-04-06 RX ADMIN — Medication 20 MILLIGRAM(S): at 05:54

## 2020-04-06 RX ADMIN — Medication 20 MILLIGRAM(S): at 22:26

## 2020-04-06 RX ADMIN — Medication 20 MILLIGRAM(S): at 14:40

## 2020-04-06 RX ADMIN — Medication 2 MILLIGRAM(S): at 18:28

## 2020-04-06 RX ADMIN — Medication 1: at 17:33

## 2020-04-06 RX ADMIN — Medication 1 APPLICATION(S): at 11:34

## 2020-04-06 RX ADMIN — BUMETANIDE 1 MILLIGRAM(S): 0.25 INJECTION INTRAMUSCULAR; INTRAVENOUS at 05:54

## 2020-04-06 RX ADMIN — LOSARTAN POTASSIUM 50 MILLIGRAM(S): 100 TABLET, FILM COATED ORAL at 05:54

## 2020-04-06 RX ADMIN — SODIUM CHLORIDE 50 MILLILITER(S): 9 INJECTION, SOLUTION INTRAVENOUS at 08:43

## 2020-04-06 RX ADMIN — ENOXAPARIN SODIUM 40 MILLIGRAM(S): 100 INJECTION SUBCUTANEOUS at 11:32

## 2020-04-06 NOTE — PROVIDER CONTACT NOTE (OTHER) - SITUATION
pt currently NPO and needs IV access to receive IV Fluids. previous RN staff and other staff attempted access 7 times and unsuccessful

## 2020-04-06 NOTE — PROGRESS NOTE ADULT - SUBJECTIVE AND OBJECTIVE BOX
CC: f/u for Sepsis    Patient reports: he is more alert, desires to go home    REVIEW OF SYSTEMS:  All other review of systems negative (Comprehensive ROS)    Antimicrobials Day #  :off    Other Medications Reviewed    T(F): 98.1 (04-06-20 @ 09:17), Max: 98.3 (04-06-20 @ 05:50)  HR: 98 (04-06-20 @ 09:17)  BP: 150/89 (04-06-20 @ 09:17)  RR: 16 (04-06-20 @ 09:17)  SpO2: 94% (04-06-20 @ 09:17)  Wt(kg): --    PHYSICAL EXAM:  General: alert, no acute distress, frail and debilitated  Eyes:  anicteric, no conjunctival injection, no discharge  Oropharynx: no lesions or injection 	  Neck: supple, without adenopathy  Lungs: clear to auscultation  Heart: regular rate and rhythm; no murmur, rubs or gallops  Abdomen: soft, nondistended, nontender, without mass or organomegaly  Skin: multiple pressure ulcers  Extremities: no clubbing, cyanosis, or edema  Neurologic: alert, oriented, lower extremity paraplegia    LAB RESULTS:                        12.9   6.72  )-----------( 253      ( 06 Apr 2020 07:25 )             40.9     04-06    140  |  96  |  14  ----------------------------<  106<H>  3.4<L>   |  32<H>  |  0.58    Ca    8.6      06 Apr 2020 07:25          MICROBIOLOGY:  RECENT CULTURES:      RADIOLOGY REVIEWED:

## 2020-04-06 NOTE — PROVIDER CONTACT NOTE (OTHER) - ACTION/TREATMENT ORDERED:
asked doctor to order swallow eval
MD aware. PA  unavailable to place line.  BS taken @ 3096 = 477  order to continue monitor pt for now

## 2020-04-06 NOTE — PROGRESS NOTE ADULT - ASSESSMENT
80 yo male admitted with low BP and elevated lactate.  He had been on aztreonam and vanco,coverage for possible sepsis, ID w.u has been negative, no clear respiratory, GI or  infection.  Local wound care may be treatment for pressure wounds.  LE dopplers negative for DVT.He is afebrile with normal BP and normal wbc.  Vanco/aztreonam stopped 4/4  He remains clinically stable  Suggest:  1.monitor off antibiotics, aspiration precautions  2.local wound care per wound care service  3. disposition per primary service, no additioinal ID w/u planned, we will stop actively following, please call if ID issues arise

## 2020-04-06 NOTE — PROGRESS NOTE ADULT - REASON FOR ADMISSION
septic shock
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
## 2020-04-07 ENCOUNTER — TRANSCRIPTION ENCOUNTER (OUTPATIENT)
Age: 80
End: 2020-04-07

## 2020-04-07 ENCOUNTER — INPATIENT (INPATIENT)
Facility: HOSPITAL | Age: 80
LOS: 4 days | Discharge: HOME | DRG: 311 | End: 2020-04-12
Attending: HOSPITALIST | Admitting: INTERNAL MEDICINE
Payer: MEDICARE

## 2020-04-07 VITALS
RESPIRATION RATE: 18 BRPM | WEIGHT: 160.06 LBS | DIASTOLIC BLOOD PRESSURE: 66 MMHG | TEMPERATURE: 99 F | OXYGEN SATURATION: 97 % | HEART RATE: 77 BPM | SYSTOLIC BLOOD PRESSURE: 117 MMHG | HEIGHT: 72 IN

## 2020-04-07 VITALS
OXYGEN SATURATION: 95 % | DIASTOLIC BLOOD PRESSURE: 76 MMHG | TEMPERATURE: 98 F | RESPIRATION RATE: 18 BRPM | SYSTOLIC BLOOD PRESSURE: 148 MMHG | HEART RATE: 98 BPM

## 2020-04-07 LAB
ALBUMIN SERPL ELPH-MCNC: 2.2 G/DL — LOW (ref 3.3–5)
ALP SERPL-CCNC: 99 U/L — SIGNIFICANT CHANGE UP (ref 40–120)
ALT FLD-CCNC: 22 U/L — SIGNIFICANT CHANGE UP (ref 10–45)
ANION GAP SERPL CALC-SCNC: 2 MMOL/L — LOW (ref 5–17)
ANION GAP SERPL CALC-SCNC: 9 MMOL/L — SIGNIFICANT CHANGE UP (ref 5–17)
AST SERPL-CCNC: 47 U/L — HIGH (ref 10–40)
BASOPHILS # BLD AUTO: 0.01 K/UL — SIGNIFICANT CHANGE UP (ref 0–0.2)
BASOPHILS NFR BLD AUTO: 0.1 % — SIGNIFICANT CHANGE UP (ref 0–2)
BILIRUB SERPL-MCNC: 0.5 MG/DL — SIGNIFICANT CHANGE UP (ref 0.2–1.2)
BUN SERPL-MCNC: 22 MG/DL — SIGNIFICANT CHANGE UP (ref 7–23)
BUN SERPL-MCNC: 24 MG/DL — HIGH (ref 7–23)
CALCIUM SERPL-MCNC: 8.3 MG/DL — LOW (ref 8.4–10.5)
CALCIUM SERPL-MCNC: 8.7 MG/DL — SIGNIFICANT CHANGE UP (ref 8.4–10.5)
CHLORIDE SERPL-SCNC: 95 MMOL/L — LOW (ref 96–108)
CHLORIDE SERPL-SCNC: 95 MMOL/L — LOW (ref 96–108)
CO2 SERPL-SCNC: 30 MMOL/L — SIGNIFICANT CHANGE UP (ref 22–31)
CO2 SERPL-SCNC: 39 MMOL/L — HIGH (ref 22–31)
CREAT SERPL-MCNC: 0.69 MG/DL — SIGNIFICANT CHANGE UP (ref 0.5–1.3)
CREAT SERPL-MCNC: 0.84 MG/DL — SIGNIFICANT CHANGE UP (ref 0.5–1.3)
EOSINOPHIL # BLD AUTO: 0.09 K/UL — SIGNIFICANT CHANGE UP (ref 0–0.5)
EOSINOPHIL NFR BLD AUTO: 1 % — SIGNIFICANT CHANGE UP (ref 0–6)
GLUCOSE BLDC GLUCOMTR-MCNC: 133 MG/DL — HIGH (ref 70–99)
GLUCOSE BLDC GLUCOMTR-MCNC: 165 MG/DL — HIGH (ref 70–99)
GLUCOSE BLDC GLUCOMTR-MCNC: 248 MG/DL — HIGH (ref 70–99)
GLUCOSE BLDC GLUCOMTR-MCNC: 264 MG/DL — HIGH (ref 70–99)
GLUCOSE SERPL-MCNC: 200 MG/DL — HIGH (ref 70–99)
GLUCOSE SERPL-MCNC: 239 MG/DL — HIGH (ref 70–99)
HCT VFR BLD CALC: 35 % — LOW (ref 39–50)
HGB BLD-MCNC: 11.3 G/DL — LOW (ref 13–17)
IMM GRANULOCYTES NFR BLD AUTO: 0.6 % — SIGNIFICANT CHANGE UP (ref 0–1.5)
LACTATE SERPL-SCNC: 1.2 MMOL/L — SIGNIFICANT CHANGE UP (ref 0.7–2)
LYMPHOCYTES # BLD AUTO: 0.72 K/UL — LOW (ref 1–3.3)
LYMPHOCYTES # BLD AUTO: 8.4 % — LOW (ref 13–44)
MCHC RBC-ENTMCNC: 27 PG — SIGNIFICANT CHANGE UP (ref 27–34)
MCHC RBC-ENTMCNC: 32.3 GM/DL — SIGNIFICANT CHANGE UP (ref 32–36)
MCV RBC AUTO: 83.5 FL — SIGNIFICANT CHANGE UP (ref 80–100)
MONOCYTES # BLD AUTO: 0.95 K/UL — HIGH (ref 0–0.9)
MONOCYTES NFR BLD AUTO: 11 % — SIGNIFICANT CHANGE UP (ref 2–14)
NEUTROPHILS # BLD AUTO: 6.78 K/UL — SIGNIFICANT CHANGE UP (ref 1.8–7.4)
NEUTROPHILS NFR BLD AUTO: 78.9 % — HIGH (ref 43–77)
NRBC # BLD: 0 /100 WBCS — SIGNIFICANT CHANGE UP (ref 0–0)
NT-PROBNP SERPL-SCNC: 1334 PG/ML — HIGH (ref 0–300)
PLATELET # BLD AUTO: 287 K/UL — SIGNIFICANT CHANGE UP (ref 150–400)
POTASSIUM SERPL-MCNC: 3.3 MMOL/L — LOW (ref 3.5–5.3)
POTASSIUM SERPL-MCNC: 3.9 MMOL/L — SIGNIFICANT CHANGE UP (ref 3.5–5.3)
POTASSIUM SERPL-SCNC: 3.3 MMOL/L — LOW (ref 3.5–5.3)
POTASSIUM SERPL-SCNC: 3.9 MMOL/L — SIGNIFICANT CHANGE UP (ref 3.5–5.3)
PROT SERPL-MCNC: 6.1 G/DL — SIGNIFICANT CHANGE UP (ref 6–8.3)
RBC # BLD: 4.19 M/UL — LOW (ref 4.2–5.8)
RBC # FLD: 15.4 % — HIGH (ref 10.3–14.5)
SODIUM SERPL-SCNC: 134 MMOL/L — LOW (ref 135–145)
SODIUM SERPL-SCNC: 136 MMOL/L — SIGNIFICANT CHANGE UP (ref 135–145)
WBC # BLD: 8.6 K/UL — SIGNIFICANT CHANGE UP (ref 3.8–10.5)
WBC # FLD AUTO: 8.6 K/UL — SIGNIFICANT CHANGE UP (ref 3.8–10.5)

## 2020-04-07 PROCEDURE — 99285 EMERGENCY DEPT VISIT HI MDM: CPT

## 2020-04-07 PROCEDURE — 93010 ELECTROCARDIOGRAM REPORT: CPT

## 2020-04-07 PROCEDURE — 71045 X-RAY EXAM CHEST 1 VIEW: CPT | Mod: 26

## 2020-04-07 PROCEDURE — 99238 HOSP IP/OBS DSCHRG MGMT 30/<: CPT

## 2020-04-07 RX ORDER — LANOLIN ALCOHOL/MO/W.PET/CERES
1 CREAM (GRAM) TOPICAL
Qty: 0 | Refills: 0 | DISCHARGE
Start: 2020-04-07

## 2020-04-07 RX ORDER — SODIUM HYPOCHLORITE 0.125 %
1 SOLUTION, NON-ORAL MISCELLANEOUS
Qty: 50 | Refills: 0
Start: 2020-04-07 | End: 2020-05-06

## 2020-04-07 RX ORDER — MINOCYCLINE HYDROCHLORIDE 45 MG/1
0 TABLET, EXTENDED RELEASE ORAL
Qty: 0 | Refills: 0 | DISCHARGE

## 2020-04-07 RX ORDER — COLLAGENASE CLOSTRIDIUM HIST. 250 UNIT/G
1 OINTMENT (GRAM) TOPICAL
Qty: 20 | Refills: 0
Start: 2020-04-07 | End: 2020-05-06

## 2020-04-07 RX ORDER — MINOCYCLINE HYDROCHLORIDE 45 MG/1
1 TABLET, EXTENDED RELEASE ORAL
Qty: 0 | Refills: 0 | DISCHARGE
Start: 2020-04-07

## 2020-04-07 RX ORDER — INSULIN LISPRO 100/ML
VIAL (ML) SUBCUTANEOUS
Refills: 0 | Status: DISCONTINUED | OUTPATIENT
Start: 2020-04-07 | End: 2020-04-07

## 2020-04-07 RX ORDER — INSULIN LISPRO 100/ML
VIAL (ML) SUBCUTANEOUS AT BEDTIME
Refills: 0 | Status: DISCONTINUED | OUTPATIENT
Start: 2020-04-07 | End: 2020-04-07

## 2020-04-07 RX ADMIN — Medication 1 APPLICATION(S): at 10:13

## 2020-04-07 RX ADMIN — BUMETANIDE 1 MILLIGRAM(S): 0.25 INJECTION INTRAMUSCULAR; INTRAVENOUS at 07:32

## 2020-04-07 RX ADMIN — ENOXAPARIN SODIUM 40 MILLIGRAM(S): 100 INJECTION SUBCUTANEOUS at 11:02

## 2020-04-07 RX ADMIN — Medication 1: at 00:40

## 2020-04-07 RX ADMIN — Medication 20 MILLIGRAM(S): at 07:32

## 2020-04-07 RX ADMIN — Medication 3: at 11:32

## 2020-04-07 RX ADMIN — Medication 20 MILLIGRAM(S): at 14:57

## 2020-04-07 RX ADMIN — Medication 2 MILLIGRAM(S): at 07:32

## 2020-04-07 RX ADMIN — LOSARTAN POTASSIUM 50 MILLIGRAM(S): 100 TABLET, FILM COATED ORAL at 07:33

## 2020-04-07 NOTE — DISCHARGE NOTE PROVIDER - NSDCFUSCHEDAPPT_GEN_ALL_CORE_FT
JERICA MAC ; 04/21/2020 ; NPP Cardio [facility] JERICA MAC ; 04/21/2020 ; NPP Cardio 70 [facility] JERICA MAC ; 04/21/2020 ; NPP Cardio 70 [facility] JERICA MAC ; 04/21/2020 ; NPP Cardio 70 [facility] JERICA MAC ; 04/21/2020 ; NPP Cardio 70 [facility]

## 2020-04-07 NOTE — DISCHARGE NOTE PROVIDER - NSDCCPCAREPLAN_GEN_ALL_CORE_FT
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 There is a chrnic stage 3 pressure wound  over the left flack/illiac crest.   Cleanse all wounds with 1/4 dakins daily. Apply santyl to left flank wound, cover with wet to dry dressing and cover with DSD. For wound over left trochanter and right back, cleanse with 1/4 dakins daily, pack with wet to dry dressing and cover with DSD. For wound over right trochanter, pack with sterile packing and cover with DSD. PRINCIPAL DISCHARGE DIAGNOSIS  Diagnosis: Sepsis  Assessment and Plan of Treatment: Your fever and shortness of breath may have possibly been cause by a wound infection or Pneumonia. You were given three days of antibiotics and are now doing well. After discharge please follow wound care instructions below for continued care of wounds.      SECONDARY DISCHARGE DIAGNOSES  Diagnosis: Congestive heart failure  Assessment and Plan of Treatment: Please continue taking bumex for treatment of congestive heart failure.    Diagnosis: Multiple open wounds without complication  Assessment and Plan of Treatment: Cleanse all wounds with 1/4 dakins daily. Apply santyl to left flank wound, cover with wet to dry dressing and cover with DSD. For wound over left trochanter and right back, cleanse with 1/4 dakins daily, pack with wet to dry dressing and cover with DSD. For wound over right trochanter, pack with sterile packing and cover with DSD.  Continue daily minocycline for wound infection suppression as prescribed prior to hospitalization.

## 2020-04-07 NOTE — ED PROVIDER NOTE - MUSCULOSKELETAL, MLM
Spine appears normal, range of motion is not limited, no muscle or joint tenderness. no leg sewlling

## 2020-04-07 NOTE — SWALLOW BEDSIDE ASSESSMENT ADULT - COMMENTS
WBC 6.72  CXR 3/31/20 No evidence of acute pulmonary disease.  As per RN, pt has been given PO crushed in applesauce.

## 2020-04-07 NOTE — DISCHARGE NOTE PROVIDER - NSDCMRMEDTOKEN_GEN_ALL_CORE_FT
baclofen 20 mg oral tablet: 1 tab(s) orally 3 times a day  Bumex 1 mg oral tablet: 1 tab(s) orally once a day  losartan 50 mg oral tablet: 1 tab(s) orally once a day  minocycline 100 mg oral tablet: orally once a day  Valium 2 mg oral tablet: 1 tab(s) orally 2 times a day baclofen 20 mg oral tablet: 1 tab(s) orally 3 times a day  Bumex 1 mg oral tablet: 1 tab(s) orally once a day  collagenase 250 units/g topical ointment: 1 application topically once a day  losartan 50 mg oral tablet: 1 tab(s) orally once a day  melatonin 3 mg oral tablet: 1 tab(s) orally once a day (at bedtime), As needed, Insomnia  sodium hypochlorite 0.125% topical solution: 1 application topically once a day baclofen 20 mg oral tablet: 1 tab(s) orally 3 times a day  Bumex 1 mg oral tablet: 1 tab(s) orally once a day  collagenase 250 units/g topical ointment: 1 application topically once a day  losartan 50 mg oral tablet: 1 tab(s) orally once a day  melatonin 3 mg oral tablet: 1 tab(s) orally once a day (at bedtime), As needed, Insomnia  minocycline 100 mg oral tablet: 1 tab(s) orally once a day  sodium hypochlorite 0.125% topical solution: 1 application topically once a day

## 2020-04-07 NOTE — ED PROVIDER NOTE - CONSTITUTIONAL, MLM
normal... Well appearing, awake, alert, oriented to person, place, time/situation and in no apparent distress. hard of hearing

## 2020-04-07 NOTE — SWALLOW BEDSIDE ASSESSMENT ADULT - SWALLOW EVAL: DIAGNOSIS
Pt alert, cooperative for assessment. Pt on 4L O2 via nasal cannula. Pt trialed with thin liquids, nectar thickened liquids, and puree consistencies.  Pt with adequate oral prep, adequate manipulation of the bolus, latent AP transport, suspected swallow delay. Multiple swallows (3+) noted across all consistencies. Overt s/s of penetration/aspiration noted for thin liquids. Pt with reduced coordination between respiration and deglutition.  No overt s/s of penetration/aspiration noted for nectar thickened liquids and puree consistencies.

## 2020-04-07 NOTE — SWALLOW BEDSIDE ASSESSMENT ADULT - SWALLOW EVAL: RECOMMENDED FEEDING/EATING TECHNIQUES
no straws/position upright (90 degrees)/small sips/bites/allow for swallow between intakes/oral hygiene/check mouth frequently for oral residue/pocketing/crush medication (when feasible)/maintain upright posture during/after eating for 30 mins

## 2020-04-07 NOTE — ED PROVIDER NOTE - OBJECTIVE STATEMENT
pt bibems from home for SOB, moderate, and cough.  pt states he has been feeling sick for 2 weeks. was just discharged today from [facility] for admission since 3/26 for sepsis, pna, stage IV decub wounds. tested neg for COVID19.  no chest pain. denies fever pt bibems from home for SOB, moderate, and cough.  pt states he has been feeling sick for 2 weeks. was just discharged today from [facility] for admission since 3/26 for sepsis, pna, stage IV decub wounds. tested neg for COVID19.  no chest pain. denies fever.    spoke with wife, who states pt appeared in resp distress at home. wife states she is HCP, pt is DNR/DNI

## 2020-04-07 NOTE — SWALLOW BEDSIDE ASSESSMENT ADULT - SLP PERTINENT HISTORY OF CURRENT PROBLEM
presents with fever, SOB, and hypoxia. Recently admitted @ [facility] 3/26-3/27 for SOB, was COVID (-), had CT chest 3/26 negative for PNA, beta blocker held 2/2 bradycardia, evaluated by cardio, was DCed home w/ bumex and ARB (no ACE due to cough). Pt returned to [facility] 3/31 w/ worsening SOB/hypoxia, fever, elevated LA, admitted for sepsis 2/2 PNA vs UTI.

## 2020-04-07 NOTE — DISCHARGE NOTE PROVIDER - CARE PROVIDER_API CALL
Reyes, John A (MD)  Internal Medicine  [address], [address]  [place] [address]  Phone: [phone]  Fax: [phone]  Established Patient  Follow Up Time:

## 2020-04-07 NOTE — DISCHARGE NOTE NURSING/CASE MANAGEMENT/SOCIAL WORK - PATIENT PORTAL LINK FT
You can access the FollowMyHealth Patient Portal offered by [facility] by registering at the following website: [email or web address]. By joining [facility]’s FollowMyHealth portal, you will also be able to view your health information using other applications (apps) compatible with our system.

## 2020-04-07 NOTE — SWALLOW BEDSIDE ASSESSMENT ADULT - ASR SWALLOW ASPIRATION MONITOR
fever/upper respiratory infection/change of breathing pattern/cough/gurgly voice/pneumonia/throat clearing

## 2020-04-07 NOTE — ED ADULT NURSE NOTE - NSIMPLEMENTINTERV_GEN_ALL_ED
Implemented All Fall with Harm Risk Interventions:  [place] to call system. Call bell, personal items and telephone within reach. Instruct patient to call for assistance. Room bathroom lighting operational. Non-slip footwear when patient is off stretcher. Physically safe environment: no spills, clutter or unnecessary equipment. Stretcher in lowest position, wheels locked, appropriate side rails in place. Provide visual cue, wrist band, yellow gown, etc. Monitor gait and stability. Monitor for mental status changes and reorient to person, place, and time. Review medications for side effects contributing to fall risk. Reinforce activity limits and safety measures with patient and family. Provide visual clues: red socks.

## 2020-04-07 NOTE — ED PROVIDER NOTE - CLINICAL SUMMARY MEDICAL DECISION MAKING FREE TEXT BOX
SOB cough 2 wks. just dc'ed from here today after sepsis admission.  tested neg for COVID 3/26. r/o chf, acs, pna, covid infx since hospitalization? SOB cough 2 wks. just dc'ed from here today after sepsis admission.  tested neg for COVID 3/26. r/o chf, acs, pna, covid infx since hospitalization?    update: cxr clear, pt o2 94-95% RA but intermittently with drops to 88% with tachypnea RR ~40.  trop elevated (.580) , change from 3/31 (0.04).  will admit for NSTEMI.  r/o covid (tested neg initially, but has been hospitalized last 2 wks)
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
## 2020-04-07 NOTE — SWALLOW BEDSIDE ASSESSMENT ADULT - PHARYNGEAL PHASE
Delayed pharyngeal swallow/Wet vocal quality post oral intake/Cough post oral intake/Multiple swallows Delayed pharyngeal swallow/Wet vocal quality post oral intake

## 2020-04-08 DIAGNOSIS — I21.4 NON-ST ELEVATION (NSTEMI) MYOCARDIAL INFARCTION: ICD-10-CM

## 2020-04-08 LAB
LDH SERPL L TO P-CCNC: 714 U/L — HIGH (ref 50–242)
PROCALCITONIN SERPL-MCNC: 0.13 NG/ML — HIGH
SARS-COV-2 RNA SPEC QL NAA+PROBE: SIGNIFICANT CHANGE UP
TROPONIN I SERPL-MCNC: 0.19 NG/ML — HIGH (ref 0.02–0.06)
TROPONIN I SERPL-MCNC: 0.34 NG/ML — HIGH (ref 0.02–0.06)
TROPONIN I SERPL-MCNC: 0.58 NG/ML — HIGH (ref 0.02–0.06)

## 2020-04-08 PROCEDURE — 99222 1ST HOSP IP/OBS MODERATE 55: CPT

## 2020-04-08 PROCEDURE — 99223 1ST HOSP IP/OBS HIGH 75: CPT

## 2020-04-08 RX ORDER — BACLOFEN 100 %
20 POWDER (GRAM) MISCELLANEOUS THREE TIMES A DAY
Refills: 0 | Status: DISCONTINUED | OUTPATIENT
Start: 2020-04-08 | End: 2020-04-09

## 2020-04-08 RX ORDER — ACETAZOLAMIDE 250 MG/1
250 TABLET ORAL ONCE
Refills: 0 | Status: COMPLETED | OUTPATIENT
Start: 2020-04-08 | End: 2020-04-08

## 2020-04-08 RX ORDER — LOSARTAN POTASSIUM 100 MG/1
50 TABLET, FILM COATED ORAL DAILY
Refills: 0 | Status: DISCONTINUED | OUTPATIENT
Start: 2020-04-08 | End: 2020-04-12

## 2020-04-08 RX ORDER — ACETAZOLAMIDE 250 MG/1
125 TABLET ORAL ONCE
Refills: 0 | Status: DISCONTINUED | OUTPATIENT
Start: 2020-04-08 | End: 2020-04-08

## 2020-04-08 RX ORDER — MINOCYCLINE HYDROCHLORIDE 45 MG/1
100 TABLET, EXTENDED RELEASE ORAL DAILY
Refills: 0 | Status: DISCONTINUED | OUTPATIENT
Start: 2020-04-08 | End: 2020-04-10

## 2020-04-08 RX ORDER — COLLAGENASE CLOSTRIDIUM HIST. 250 UNIT/G
1 OINTMENT (GRAM) TOPICAL DAILY
Refills: 0 | Status: DISCONTINUED | OUTPATIENT
Start: 2020-04-08 | End: 2020-04-12

## 2020-04-08 RX ORDER — BACLOFEN 100 %
1 POWDER (GRAM) MISCELLANEOUS
Qty: 0 | Refills: 0 | DISCHARGE

## 2020-04-08 RX ORDER — SODIUM HYPOCHLORITE 0.125 %
1 SOLUTION, NON-ORAL MISCELLANEOUS DAILY
Refills: 0 | Status: DISCONTINUED | OUTPATIENT
Start: 2020-04-08 | End: 2020-04-12

## 2020-04-08 RX ORDER — ACETAMINOPHEN 500 MG
650 TABLET ORAL ONCE
Refills: 0 | Status: DISCONTINUED | OUTPATIENT
Start: 2020-04-08 | End: 2020-04-08

## 2020-04-08 RX ORDER — BUMETANIDE 0.25 MG/ML
1 INJECTION INTRAMUSCULAR; INTRAVENOUS DAILY
Refills: 0 | Status: DISCONTINUED | OUTPATIENT
Start: 2020-04-09 | End: 2020-04-09

## 2020-04-08 RX ORDER — LANOLIN ALCOHOL/MO/W.PET/CERES
3 CREAM (GRAM) TOPICAL AT BEDTIME
Refills: 0 | Status: DISCONTINUED | OUTPATIENT
Start: 2020-04-08 | End: 2020-04-12

## 2020-04-08 RX ORDER — ENOXAPARIN SODIUM 100 MG/ML
40 INJECTION SUBCUTANEOUS DAILY
Refills: 0 | Status: DISCONTINUED | OUTPATIENT
Start: 2020-04-08 | End: 2020-04-12

## 2020-04-08 RX ORDER — ASPIRIN/CALCIUM CARB/MAGNESIUM 324 MG
324 TABLET ORAL ONCE
Refills: 0 | Status: COMPLETED | OUTPATIENT
Start: 2020-04-08 | End: 2020-04-08

## 2020-04-08 RX ADMIN — Medication 20 MILLIGRAM(S): at 22:00

## 2020-04-08 RX ADMIN — Medication 40 MILLIGRAM(S): at 04:00

## 2020-04-08 RX ADMIN — Medication 324 MILLIGRAM(S): at 01:00

## 2020-04-08 RX ADMIN — Medication 20 MILLIGRAM(S): at 06:12

## 2020-04-08 RX ADMIN — ACETAZOLAMIDE 105 MILLIGRAM(S): 250 TABLET ORAL at 04:00

## 2020-04-08 RX ADMIN — Medication 1 APPLICATION(S): at 12:40

## 2020-04-08 RX ADMIN — ENOXAPARIN SODIUM 40 MILLIGRAM(S): 100 INJECTION SUBCUTANEOUS at 12:40

## 2020-04-08 RX ADMIN — Medication 1 APPLICATION(S): at 12:39

## 2020-04-08 RX ADMIN — LOSARTAN POTASSIUM 50 MILLIGRAM(S): 100 TABLET, FILM COATED ORAL at 06:12

## 2020-04-08 NOTE — H&P ADULT - NSHPPHYSICALEXAM_GEN_ALL_CORE
Vital Signs (24 Hrs):  T(C): 36.9 (04-07-20 @ 23:30), Max: 37.3 (04-07-20 @ 21:48)  HR: 73 (04-08-20 @ 00:00) (68 - 103)  BP: 98/33 (04-08-20 @ 00:00) (97/59 - 150/85)  RR: 28 (04-08-20 @ 00:00) (18 - 31)  SpO2: 94% (04-08-20 @ 00:00) (92% - 100%)  Daily Height in cm: 182.88 (07 Apr 2020 21:48)

## 2020-04-08 NOTE — ED ADULT NURSE REASSESSMENT NOTE - NS ED NURSE REASSESS COMMENT FT1
Pt with pressure injury x2 on left hip and x1 on right hip and right side of back. Wound dressings changed and cleansed with Dakins solution and santyl ointment applied per MD orders.

## 2020-04-08 NOTE — ED ADULT NURSE REASSESSMENT NOTE - NS ED NURSE REASSESS COMMENT FT1
MD Ohara notified pt remains lethargic and unable to tolerate PO at this time. Pt VS stable and remains on 2L NC with oxygen saturation between %. No further orders at this time. Will continue to monitor.
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
## 2020-04-08 NOTE — CONSULT NOTE ADULT - ASSESSMENT
79 year old man with multiple medical problems including paraplegia and large pressure ulcers, colostomy.... admitted with increasing lethargy and dyspnea.  He has had 2 recent hospital admissions.  Elevated troponin on this admission... now trending down. No acute EKG changes... suspect that this is consistent with demand ischemia.   He has known diastolic CHF.  Recent echo had demonstrated normal LV function and elevated right sided pressures.  Recent LE Doppler was negative for DVT.  His overall prognosis is poor.    Plan  - favor conservative cardiac workup... he is not a candidate for invasive cardiac testing  - continue ASA  - continue losartan  - repeat COVID testing pending  - diuresis as needed.. bumetadine... he has reported Lasix allergy --> rash   - O2 as needed  - agree with plans for Palliative care consult    discussed with Medicine team
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
## 2020-04-08 NOTE — ED ADULT NURSE REASSESSMENT NOTE - NS ED NURSE REASSESS COMMENT FT1
Assumed care of pt from SEAN Pena. Pt resting in bed comfortably at this time. Pt on 2L NC with o2 saturation 99%. No distress noted. Pt on cardiac monitor and will continue to monitor. Lab called for AM blood draw.

## 2020-04-09 LAB
ANION GAP SERPL CALC-SCNC: 7 MMOL/L — SIGNIFICANT CHANGE UP (ref 5–17)
BUN SERPL-MCNC: 25 MG/DL — HIGH (ref 7–23)
CALCIUM SERPL-MCNC: 9.1 MG/DL — SIGNIFICANT CHANGE UP (ref 8.4–10.5)
CHLORIDE SERPL-SCNC: 97 MMOL/L — SIGNIFICANT CHANGE UP (ref 96–108)
CO2 SERPL-SCNC: 36 MMOL/L — HIGH (ref 22–31)
CREAT SERPL-MCNC: 0.69 MG/DL — SIGNIFICANT CHANGE UP (ref 0.5–1.3)
CRP SERPL-MCNC: 7.01 MG/DL — HIGH (ref 0–0.4)
GLUCOSE SERPL-MCNC: 150 MG/DL — HIGH (ref 70–99)
HCT VFR BLD CALC: 39.4 % — SIGNIFICANT CHANGE UP (ref 39–50)
HGB BLD-MCNC: 12.7 G/DL — LOW (ref 13–17)
MCHC RBC-ENTMCNC: 27.1 PG — SIGNIFICANT CHANGE UP (ref 27–34)
MCHC RBC-ENTMCNC: 32.2 GM/DL — SIGNIFICANT CHANGE UP (ref 32–36)
MCV RBC AUTO: 84 FL — SIGNIFICANT CHANGE UP (ref 80–100)
NRBC # BLD: 0 /100 WBCS — SIGNIFICANT CHANGE UP (ref 0–0)
PLATELET # BLD AUTO: 337 K/UL — SIGNIFICANT CHANGE UP (ref 150–400)
POTASSIUM SERPL-MCNC: 3.4 MMOL/L — LOW (ref 3.5–5.3)
POTASSIUM SERPL-SCNC: 3.4 MMOL/L — LOW (ref 3.5–5.3)
PROCALCITONIN SERPL-MCNC: 0.1 NG/ML — HIGH
RBC # BLD: 4.69 M/UL — SIGNIFICANT CHANGE UP (ref 4.2–5.8)
RBC # FLD: 15.5 % — HIGH (ref 10.3–14.5)
SODIUM SERPL-SCNC: 140 MMOL/L — SIGNIFICANT CHANGE UP (ref 135–145)
WBC # BLD: 17.83 K/UL — HIGH (ref 3.8–10.5)
WBC # FLD AUTO: 17.83 K/UL — HIGH (ref 3.8–10.5)

## 2020-04-09 PROCEDURE — 99233 SBSQ HOSP IP/OBS HIGH 50: CPT

## 2020-04-09 RX ORDER — CEFEPIME 1 G/1
1000 INJECTION, POWDER, FOR SOLUTION INTRAMUSCULAR; INTRAVENOUS EVERY 8 HOURS
Refills: 0 | Status: DISCONTINUED | OUTPATIENT
Start: 2020-04-09 | End: 2020-04-12

## 2020-04-09 RX ORDER — CEFEPIME 1 G/1
1000 INJECTION, POWDER, FOR SOLUTION INTRAMUSCULAR; INTRAVENOUS ONCE
Refills: 0 | Status: COMPLETED | OUTPATIENT
Start: 2020-04-09 | End: 2020-04-09

## 2020-04-09 RX ORDER — KETOROLAC TROMETHAMINE 30 MG/ML
15 SYRINGE (ML) INJECTION EVERY 8 HOURS
Refills: 0 | Status: DISCONTINUED | OUTPATIENT
Start: 2020-04-09 | End: 2020-04-10

## 2020-04-09 RX ORDER — VANCOMYCIN HCL 1 G
1000 VIAL (EA) INTRAVENOUS ONCE
Refills: 0 | Status: COMPLETED | OUTPATIENT
Start: 2020-04-09 | End: 2020-04-09

## 2020-04-09 RX ORDER — ACETAMINOPHEN 500 MG
650 TABLET ORAL EVERY 6 HOURS
Refills: 0 | Status: DISCONTINUED | OUTPATIENT
Start: 2020-04-09 | End: 2020-04-12

## 2020-04-09 RX ORDER — SODIUM CHLORIDE 9 MG/ML
250 INJECTION, SOLUTION INTRAVENOUS
Refills: 0 | Status: COMPLETED | OUTPATIENT
Start: 2020-04-09 | End: 2020-04-10

## 2020-04-09 RX ORDER — POTASSIUM CHLORIDE 20 MEQ
10 PACKET (EA) ORAL
Refills: 0 | Status: COMPLETED | OUTPATIENT
Start: 2020-04-09 | End: 2020-04-09

## 2020-04-09 RX ORDER — CEFEPIME 1 G/1
INJECTION, POWDER, FOR SOLUTION INTRAMUSCULAR; INTRAVENOUS
Refills: 0 | Status: DISCONTINUED | OUTPATIENT
Start: 2020-04-09 | End: 2020-04-12

## 2020-04-09 RX ORDER — POTASSIUM CHLORIDE 20 MEQ
40 PACKET (EA) ORAL ONCE
Refills: 0 | Status: DISCONTINUED | OUTPATIENT
Start: 2020-04-09 | End: 2020-04-09

## 2020-04-09 RX ORDER — SODIUM CHLORIDE 9 MG/ML
1000 INJECTION, SOLUTION INTRAVENOUS
Refills: 0 | Status: DISCONTINUED | OUTPATIENT
Start: 2020-04-09 | End: 2020-04-10

## 2020-04-09 RX ORDER — ATORVASTATIN CALCIUM 80 MG/1
20 TABLET, FILM COATED ORAL AT BEDTIME
Refills: 0 | Status: DISCONTINUED | OUTPATIENT
Start: 2020-04-09 | End: 2020-04-12

## 2020-04-09 RX ORDER — TRAMADOL HYDROCHLORIDE 50 MG/1
25 TABLET ORAL THREE TIMES A DAY
Refills: 0 | Status: DISCONTINUED | OUTPATIENT
Start: 2020-04-09 | End: 2020-04-12

## 2020-04-09 RX ADMIN — Medication 100 MILLIEQUIVALENT(S): at 11:05

## 2020-04-09 RX ADMIN — Medication 20 MILLIGRAM(S): at 11:08

## 2020-04-09 RX ADMIN — TRAMADOL HYDROCHLORIDE 25 MILLIGRAM(S): 50 TABLET ORAL at 03:46

## 2020-04-09 RX ADMIN — Medication 1 APPLICATION(S): at 11:06

## 2020-04-09 RX ADMIN — Medication 20 MILLIGRAM(S): at 05:50

## 2020-04-09 RX ADMIN — ENOXAPARIN SODIUM 40 MILLIGRAM(S): 100 INJECTION SUBCUTANEOUS at 11:07

## 2020-04-09 RX ADMIN — Medication 100 MILLIEQUIVALENT(S): at 17:01

## 2020-04-09 RX ADMIN — Medication 250 MILLIGRAM(S): at 22:38

## 2020-04-09 RX ADMIN — MINOCYCLINE HYDROCHLORIDE 100 MILLIGRAM(S): 45 TABLET, EXTENDED RELEASE ORAL at 11:07

## 2020-04-09 RX ADMIN — CEFEPIME 100 MILLIGRAM(S): 1 INJECTION, POWDER, FOR SOLUTION INTRAMUSCULAR; INTRAVENOUS at 17:02

## 2020-04-09 RX ADMIN — ATORVASTATIN CALCIUM 20 MILLIGRAM(S): 80 TABLET, FILM COATED ORAL at 22:38

## 2020-04-09 RX ADMIN — CEFEPIME 100 MILLIGRAM(S): 1 INJECTION, POWDER, FOR SOLUTION INTRAMUSCULAR; INTRAVENOUS at 22:38
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
## 2020-04-09 NOTE — SWALLOW BEDSIDE ASSESSMENT ADULT - ASR SWALLOW ASPIRATION MONITOR
pneumonia/oral hygiene/change of breathing pattern/gurgly voice/cough/fever/throat clearing/upper respiratory infection

## 2020-04-09 NOTE — SWALLOW BEDSIDE ASSESSMENT ADULT - PHARYNGEAL PHASE
Multiple swallows/Cough post oral intake/Delayed pharyngeal swallow/Decreased laryngeal elevation/Delayed throat clear post oral intake Delayed throat clear post oral intake/Delayed pharyngeal swallow/Decreased laryngeal elevation/Multiple swallows

## 2020-04-09 NOTE — SWALLOW BEDSIDE ASSESSMENT ADULT - SWALLOW EVAL: DIAGNOSIS
oral-pharyngeal dysphagia characterized by reduced oral grading, decreased acceptance of PO trials. Oral holding with anterior loss, latent A-P transport, suspected premature loss of bolus with delayed pharyngeal swallow. Hyolaryngeal excursion palpated to be reduced with multiple swallows per bolus. Immediate and delayed cough noted with wet vocal quality after PO trials. Patient is at high risk for aspiration

## 2020-04-09 NOTE — PROGRESS NOTE ADULT - SUBJECTIVE AND OBJECTIVE BOX
Medicine Progress Note    Patient is a 79y old  Male who presents with a chief complaint of Lethargy, dyspnea (08 Apr 2020 10:42)      SUBJECTIVE / OVERNIGHT EVENTS: seen and examined at bedside with palliative RN. patient is very lethargic and confused with soft voice. reports feeling weak, no other complaints.    ROS:  could not obtain as patient was not answering    ADDITIONAL REVIEW OF SYSTEMS:    MEDICATIONS  (STANDING):  baclofen 20 milliGRAM(s) Oral three times a day  cefepime   IVPB      collagenase Ointment 1 Application(s) Topical daily  Dakins Solution - 1/4 Strength 1 Application(s) Topical daily  dextrose 5% + sodium chloride 0.9%. 1000 milliLiter(s) (60 mL/Hr) IV Continuous <Continuous>  dextrose 5% + sodium chloride 0.9%. 250 milliLiter(s) (75 mL/Hr) IV Continuous <Continuous>  enoxaparin Injectable 40 milliGRAM(s) SubCutaneous daily  losartan 50 milliGRAM(s) Oral daily  minocycline 100 milliGRAM(s) Oral daily  potassium chloride  10 mEq/100 mL IVPB 10 milliEquivalent(s) IV Intermittent every 1 hour  vancomycin  IVPB 1000 milliGRAM(s) IV Intermittent once    MEDICATIONS  (PRN):  acetaminophen   Tablet .. 650 milliGRAM(s) Oral every 6 hours PRN Temp greater or equal to 38C (100.4F), Mild Pain (1 - 3), Moderate Pain (4 - 6)  melatonin 3 milliGRAM(s) Oral at bedtime PRN Insomnia  traMADol 25 milliGRAM(s) Oral three times a day PRN Severe Pain (7 - 10)    CAPILLARY BLOOD GLUCOSE        I&O's Summary      PHYSICAL EXAM:  Vital Signs Last 24 Hrs  T(C): 36.4 (09 Apr 2020 06:18), Max: 36.4 (09 Apr 2020 06:18)  T(F): 97.5 (09 Apr 2020 06:18), Max: 97.5 (09 Apr 2020 06:18)  HR: 72 (08 Apr 2020 18:52) (55 - 72)  BP: 130/71 (09 Apr 2020 06:18) (130/71 - 151/84)  BP(mean): --  RR: 20 (09 Apr 2020 06:18) (18 - 20)  SpO2: 98% (09 Apr 2020 06:18) (96% - 100%)    CONSTITUTIONAL: NAD, well-developed, well-groomed  ENMT: Moist oral mucosa, no pharyngeal injection or exudates; normal dentition  RESPIRATORY: Normal respiratory effort; reduced BLAE. ronchi+  CARDIOVASCULAR: Regular rate and rhythm, normal S1 and S2, no murmur/rub/gallop; No lower extremity edema; Peripheral pulses are 2+ bilaterally  ABDOMEN: Nontender to palpation, normoactive bowel sounds, no rebound/guarding; No hepatosplenomegaly  PSYCH: AAO*1. confused.   NEUROLOGY: unable to check. moving limbs   SKIN: No rashes; no palpable lesions    LABS:                        12.7   17.83 )-----------( 337      ( 09 Apr 2020 06:55 )             39.4     04-09    140  |  97  |  25<H>  ----------------------------<  150<H>  3.4<L>   |  36<H>  |  0.69    Ca    9.1      09 Apr 2020 06:55    TPro  6.1  /  Alb  2.2<L>  /  TBili  0.5  /  DBili  x   /  AST  47<H>  /  ALT  22  /  AlkPhos  99  04-07      CARDIAC MARKERS ( 08 Apr 2020 10:37 )  .194 ng/mL / x     / x     / x     / x      CARDIAC MARKERS ( 08 Apr 2020 03:00 )  .338 ng/mL / x     / x     / x     / x      CARDIAC MARKERS ( 07 Apr 2020 23:20 )  .580 ng/mL / x     / x     / x     / x          COVID-19 PCR: NotDetec (07 Apr 2020 22:45)      RADIOLOGY & ADDITIONAL TESTS:  Imaging from Last 24 Hours:    Electrocardiogram/QTc Interval:    < from: Xray Chest 1 View AP/PA (04.07.20 @ 22:29) >    IMPRESSION:     Cardiomegaly. Clear lungs.    < end of copied text >      COORDINATION OF CARE:  Care Discussed with Consultants/Other Providers:

## 2020-04-09 NOTE — SWALLOW BEDSIDE ASSESSMENT ADULT - SLP PERTINENT HISTORY OF CURRENT PROBLEM
79 year old male pt bibems from home for SOB, moderate, and cough.  pt states he has been feeling sick for 2 weeks. was just discharged today from [facility] for admission since 3/26 for sepsis, pna, stage IV decub wounds. tested neg for COVID19.

## 2020-04-10 LAB
ANION GAP SERPL CALC-SCNC: 10 MMOL/L — SIGNIFICANT CHANGE UP (ref 5–17)
BASOPHILS # BLD AUTO: 0.04 K/UL — SIGNIFICANT CHANGE UP (ref 0–0.2)
BASOPHILS NFR BLD AUTO: 0.3 % — SIGNIFICANT CHANGE UP (ref 0–2)
BUN SERPL-MCNC: 28 MG/DL — HIGH (ref 7–23)
CALCIUM SERPL-MCNC: 8.7 MG/DL — SIGNIFICANT CHANGE UP (ref 8.4–10.5)
CHLORIDE SERPL-SCNC: 97 MMOL/L — SIGNIFICANT CHANGE UP (ref 96–108)
CO2 SERPL-SCNC: 31 MMOL/L — SIGNIFICANT CHANGE UP (ref 22–31)
CREAT SERPL-MCNC: 0.79 MG/DL — SIGNIFICANT CHANGE UP (ref 0.5–1.3)
EOSINOPHIL # BLD AUTO: 0.04 K/UL — SIGNIFICANT CHANGE UP (ref 0–0.5)
EOSINOPHIL NFR BLD AUTO: 0.3 % — SIGNIFICANT CHANGE UP (ref 0–6)
GLUCOSE BLDC GLUCOMTR-MCNC: 270 MG/DL — HIGH (ref 70–99)
GLUCOSE SERPL-MCNC: 202 MG/DL — HIGH (ref 70–99)
HCT VFR BLD CALC: 38.9 % — LOW (ref 39–50)
HGB BLD-MCNC: 12.4 G/DL — LOW (ref 13–17)
IMM GRANULOCYTES NFR BLD AUTO: 0.7 % — SIGNIFICANT CHANGE UP (ref 0–1.5)
LYMPHOCYTES # BLD AUTO: 0.58 K/UL — LOW (ref 1–3.3)
LYMPHOCYTES # BLD AUTO: 3.9 % — LOW (ref 13–44)
MAGNESIUM SERPL-MCNC: 1.8 MG/DL — SIGNIFICANT CHANGE UP (ref 1.6–2.6)
MCHC RBC-ENTMCNC: 26.8 PG — LOW (ref 27–34)
MCHC RBC-ENTMCNC: 31.9 GM/DL — LOW (ref 32–36)
MCV RBC AUTO: 84.2 FL — SIGNIFICANT CHANGE UP (ref 80–100)
MONOCYTES # BLD AUTO: 0.86 K/UL — SIGNIFICANT CHANGE UP (ref 0–0.9)
MONOCYTES NFR BLD AUTO: 5.7 % — SIGNIFICANT CHANGE UP (ref 2–14)
NEUTROPHILS # BLD AUTO: 13.41 K/UL — HIGH (ref 1.8–7.4)
NEUTROPHILS NFR BLD AUTO: 89.1 % — HIGH (ref 43–77)
NRBC # BLD: 0 /100 WBCS — SIGNIFICANT CHANGE UP (ref 0–0)
PLATELET # BLD AUTO: 272 K/UL — SIGNIFICANT CHANGE UP (ref 150–400)
POTASSIUM SERPL-MCNC: 3.9 MMOL/L — SIGNIFICANT CHANGE UP (ref 3.5–5.3)
POTASSIUM SERPL-SCNC: 3.9 MMOL/L — SIGNIFICANT CHANGE UP (ref 3.5–5.3)
RBC # BLD: 4.62 M/UL — SIGNIFICANT CHANGE UP (ref 4.2–5.8)
RBC # FLD: 15.4 % — HIGH (ref 10.3–14.5)
SARS-COV-2 RNA SPEC QL NAA+PROBE: SIGNIFICANT CHANGE UP
SODIUM SERPL-SCNC: 138 MMOL/L — SIGNIFICANT CHANGE UP (ref 135–145)
WBC # BLD: 15.03 K/UL — HIGH (ref 3.8–10.5)
WBC # FLD AUTO: 15.03 K/UL — HIGH (ref 3.8–10.5)

## 2020-04-10 PROCEDURE — 99233 SBSQ HOSP IP/OBS HIGH 50: CPT | Mod: GC

## 2020-04-10 PROCEDURE — 99233 SBSQ HOSP IP/OBS HIGH 50: CPT

## 2020-04-10 RX ORDER — DEXTROSE 50 % IN WATER 50 %
12.5 SYRINGE (ML) INTRAVENOUS ONCE
Refills: 0 | Status: DISCONTINUED | OUTPATIENT
Start: 2020-04-10 | End: 2020-04-12

## 2020-04-10 RX ORDER — GLUCAGON INJECTION, SOLUTION 0.5 MG/.1ML
1 INJECTION, SOLUTION SUBCUTANEOUS ONCE
Refills: 0 | Status: DISCONTINUED | OUTPATIENT
Start: 2020-04-10 | End: 2020-04-12

## 2020-04-10 RX ORDER — DEXTROSE 50 % IN WATER 50 %
15 SYRINGE (ML) INTRAVENOUS ONCE
Refills: 0 | Status: DISCONTINUED | OUTPATIENT
Start: 2020-04-10 | End: 2020-04-12

## 2020-04-10 RX ORDER — INSULIN LISPRO 100/ML
VIAL (ML) SUBCUTANEOUS
Refills: 0 | Status: DISCONTINUED | OUTPATIENT
Start: 2020-04-10 | End: 2020-04-10

## 2020-04-10 RX ORDER — MINOCYCLINE HYDROCHLORIDE 45 MG/1
100 TABLET, EXTENDED RELEASE ORAL DAILY
Refills: 0 | Status: DISCONTINUED | OUTPATIENT
Start: 2020-04-11 | End: 2020-04-12

## 2020-04-10 RX ORDER — DEXTROSE 50 % IN WATER 50 %
25 SYRINGE (ML) INTRAVENOUS ONCE
Refills: 0 | Status: DISCONTINUED | OUTPATIENT
Start: 2020-04-10 | End: 2020-04-12

## 2020-04-10 RX ORDER — SODIUM CHLORIDE 9 MG/ML
1000 INJECTION, SOLUTION INTRAVENOUS
Refills: 0 | Status: DISCONTINUED | OUTPATIENT
Start: 2020-04-10 | End: 2020-04-12

## 2020-04-10 RX ORDER — INSULIN LISPRO 100/ML
VIAL (ML) SUBCUTANEOUS
Refills: 0 | Status: DISCONTINUED | OUTPATIENT
Start: 2020-04-10 | End: 2020-04-12

## 2020-04-10 RX ADMIN — CEFEPIME 100 MILLIGRAM(S): 1 INJECTION, POWDER, FOR SOLUTION INTRAMUSCULAR; INTRAVENOUS at 20:18

## 2020-04-10 RX ADMIN — MINOCYCLINE HYDROCHLORIDE 100 MILLIGRAM(S): 45 TABLET, EXTENDED RELEASE ORAL at 12:56

## 2020-04-10 RX ADMIN — ATORVASTATIN CALCIUM 20 MILLIGRAM(S): 80 TABLET, FILM COATED ORAL at 20:01

## 2020-04-10 RX ADMIN — ENOXAPARIN SODIUM 40 MILLIGRAM(S): 100 INJECTION SUBCUTANEOUS at 12:56

## 2020-04-10 RX ADMIN — CEFEPIME 100 MILLIGRAM(S): 1 INJECTION, POWDER, FOR SOLUTION INTRAMUSCULAR; INTRAVENOUS at 12:57

## 2020-04-10 RX ADMIN — CEFEPIME 100 MILLIGRAM(S): 1 INJECTION, POWDER, FOR SOLUTION INTRAMUSCULAR; INTRAVENOUS at 05:29

## 2020-04-10 RX ADMIN — Medication 1 APPLICATION(S): at 12:10

## 2020-04-10 RX ADMIN — TRAMADOL HYDROCHLORIDE 25 MILLIGRAM(S): 50 TABLET ORAL at 23:26

## 2020-04-10 RX ADMIN — Medication 1: at 12:55

## 2020-04-10 RX ADMIN — SODIUM CHLORIDE 75 MILLILITER(S): 9 INJECTION, SOLUTION INTRAVENOUS at 13:01

## 2020-04-10 RX ADMIN — LOSARTAN POTASSIUM 50 MILLIGRAM(S): 100 TABLET, FILM COATED ORAL at 05:30

## 2020-04-10 RX ADMIN — SODIUM CHLORIDE 60 MILLILITER(S): 9 INJECTION, SOLUTION INTRAVENOUS at 13:01
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
## 2020-04-10 NOTE — CONSULT NOTE ADULT - SUBJECTIVE AND OBJECTIVE BOX
HPI:   Patient is a 79y male with paraplegia, chronic decubiti, colostomy, DM, CHF here 3/31-4/7- seem by us, initial concern for sepsis, but no clear source, completed short course Vanco and Aztreo; local wound care.  He returned late night 4/7 with lethargy, resp distress, cough.  87% sat on arrival to ED.  Afebrile since arrival.  He remains lethargic, slow to arouse, nonverbal, provides no info.  Vanco x 1, Cefepime ongoing, along with Jhoan.    REVIEW OF SYSTEMS:  Not provided    PAST MEDICAL & SURGICAL HISTORY:  Macular degeneration  Hard of hearing  Diabetes  Paraplegia  H/O rectal sphincterotomy  History of bilateral cataract extraction  H/O colostomy      Allergies  Bactrim (Rash)  Cipro (Unknown)  Levaquin (Unknown)  penicillin (Rash)  shellfish (Unknown)  sulfa drugs (Unknown)    Antimicrobials Day # 2    cefepime   IVPB 1000 milliGRAM(s) IV Intermittent every 8 hours  cefepime   IVPB        Other Medications:  acetaminophen   Tablet .. 650 milliGRAM(s) Oral every 6 hours PRN  atorvastatin 20 milliGRAM(s) Oral at bedtime  collagenase Ointment 1 Application(s) Topical daily  Dakins Solution - 1/4 Strength 1 Application(s) Topical daily  dextrose 40% Gel 15 Gram(s) Oral once PRN  dextrose 5%. 1000 milliLiter(s) IV Continuous <Continuous>  dextrose 50% Injectable 12.5 Gram(s) IV Push once  dextrose 50% Injectable 25 Gram(s) IV Push once  dextrose 50% Injectable 25 Gram(s) IV Push once  enoxaparin Injectable 40 milliGRAM(s) SubCutaneous daily  glucagon  Injectable 1 milliGRAM(s) IntraMuscular once PRN  insulin lispro (HumaLOG) corrective regimen sliding scale   SubCutaneous three times a day before meals  losartan 50 milliGRAM(s) Oral daily  melatonin 3 milliGRAM(s) Oral at bedtime PRN  traMADol 25 milliGRAM(s) Oral three times a day PRN      FAMILY HISTORY:  Family history of leukemia  Family history of tuberculosis    SOCIAL HISTORY:       T(F): 98.5 (04-10-20 @ 16:12), Max: 98.8 (04-09-20 @ 17:30)  HR: 70 (04-10-20 @ 16:12)  BP: 112/59 (04-10-20 @ 16:12)  RR: 20 (04-10-20 @ 16:12)  SpO2: 97% (04-10-20 @ 16:12)  Wt(kg): --    PHYSICAL EXAM:  General: no acute distress  Eyes:  anicteric, no conjunctival injection, no discharge  Oropharynx: no lesions or injection 	  Neck: supple, without adenopathy  Lungs: clear anteriorly  Heart: regular rate and rhythm; no murmur, rubs or gallops  Abdomen: soft, nondistended, nontender, colostomy functional  Skin: no rash  Extremities: no edema  Neurologic: arouses, nonverbal, not following commands    LAB RESULTS:                        12.4   15.03 )-----------( 272      ( 10 Apr 2020 08:06 )             38.9     04-10    138  |  97  |  28<H>  ----------------------------<  202<H>  3.9   |  31  |  0.79    Ca    8.7      10 Apr 2020 08:06  Mg     1.8     04-10    Trend Procalcitonin  04-09-20 @ 13:45   -  0.10<H>  04-08-20 @ 00:35   -  0.13<H>  03-31-20 @ 09:15   -  0.17<H>    MICROBIOLOGY:  RECENT CULTURES:  Pending    COVID-19 PCR . (04.07.20 @ 22:45)    COVID-19 PCR: NotDete    COVID-19 PCR . (03.31.20 @ 09:15)    COVID-19 PCR: NotDetec    RADIOLOGY REVIEWED:  Xray Chest 1 View AP/PA (04.07.20 @ 22:29) >  Cardiomegaly. Clear lungs.    CT Abdomen and Pelvis No Cont (04.01.20 @ 11:51) >  Trace free air is identified in the anterior abdomen.  No evidence of intra-abdominal abscess. No kim bowel related inflammation nor obstruction.  The patient is status post Cuevas's procedure and diverting colostomy in the left abdomen.  Suggest clinical correlation and repeat evaluation as clinically warranted.  Right sacral decubitus ulcer suspected. HPI:   Patient is a 79y male with paraplegia, chronic decubiti, colostomy, DM, CHF here 3/31-4/7- seem by us, initial concern for sepsis, but no clear source, completed short course Vanco and Aztreo; local wound care.  He returned late night 4/7 with lethargy, resp distress, cough.  87% sat on arrival to ED.  Afebrile since arrival.  He currently arouses, cooperative, denies pain, limited info.  Vanco x 1, Cefepime ongoing, along with Jhoan.    REVIEW OF SYSTEMS:  Not provided    PAST MEDICAL & SURGICAL HISTORY:  Macular degeneration  Hard of hearing  Diabetes  Paraplegia  H/O rectal sphincterotomy  History of bilateral cataract extraction  H/O colostomy      Allergies  Bactrim (Rash)  Cipro (Unknown)  Levaquin (Unknown)  penicillin (Rash)  shellfish (Unknown)  sulfa drugs (Unknown)    Antimicrobials Day # 2    cefepime   IVPB 1000 milliGRAM(s) IV Intermittent every 8 hours  cefepime   IVPB        Other Medications:  acetaminophen   Tablet .. 650 milliGRAM(s) Oral every 6 hours PRN  atorvastatin 20 milliGRAM(s) Oral at bedtime  collagenase Ointment 1 Application(s) Topical daily  Dakins Solution - 1/4 Strength 1 Application(s) Topical daily  dextrose 40% Gel 15 Gram(s) Oral once PRN  dextrose 5%. 1000 milliLiter(s) IV Continuous <Continuous>  dextrose 50% Injectable 12.5 Gram(s) IV Push once  dextrose 50% Injectable 25 Gram(s) IV Push once  dextrose 50% Injectable 25 Gram(s) IV Push once  enoxaparin Injectable 40 milliGRAM(s) SubCutaneous daily  glucagon  Injectable 1 milliGRAM(s) IntraMuscular once PRN  insulin lispro (HumaLOG) corrective regimen sliding scale   SubCutaneous three times a day before meals  losartan 50 milliGRAM(s) Oral daily  melatonin 3 milliGRAM(s) Oral at bedtime PRN  traMADol 25 milliGRAM(s) Oral three times a day PRN      FAMILY HISTORY:  Family history of leukemia  Family history of tuberculosis    SOCIAL HISTORY:       T(F): 98.5 (04-10-20 @ 16:12), Max: 98.8 (04-09-20 @ 17:30)  HR: 70 (04-10-20 @ 16:12)  BP: 112/59 (04-10-20 @ 16:12)  RR: 20 (04-10-20 @ 16:12)  SpO2: 97% (04-10-20 @ 16:12)  Wt(kg): --    PHYSICAL EXAM:  General: no acute distress  Eyes:  anicteric, no conjunctival injection, no discharge  Oropharynx: no lesions or injection 	  Neck: supple, without adenopathy  Lungs: clear anteriorly  Heart: regular rate and rhythm; no murmur, rubs or gallops  Abdomen: soft, nondistended, nontender, colostomy functional  Skin: no rash  Extremities: no edema.  R wrist, infiltrated IV site- edema, slight erythema  Neurologic: arouses, cooperative, LE weakness    LAB RESULTS:                        12.4   15.03 )-----------( 272      ( 10 Apr 2020 08:06 )             38.9     04-10    138  |  97  |  28<H>  ----------------------------<  202<H>  3.9   |  31  |  0.79    Ca    8.7      10 Apr 2020 08:06  Mg     1.8     04-10    Trend Procalcitonin  04-09-20 @ 13:45   -  0.10<H>  04-08-20 @ 00:35   -  0.13<H>  03-31-20 @ 09:15   -  0.17<H>    MICROBIOLOGY:  RECENT CULTURES:  Pending    COVID-19 PCR . (04.07.20 @ 22:45)    COVID-19 PCR: [facility]    COVID-19 PCR . (03.31.20 @ 09:15)    COVID-19 PCR: NotDetec    RADIOLOGY REVIEWED:  Xray Chest 1 View AP/PA (04.07.20 @ 22:29) >  Cardiomegaly. Clear lungs.    CT Abdomen and Pelvis No Cont (04.01.20 @ 11:51) >  Trace free air is identified in the anterior abdomen.  No evidence of intra-abdominal abscess. No kim bowel related inflammation nor obstruction.  The patient is status post Cuevas's procedure and diverting colostomy in the left abdomen.  Suggest clinical correlation and repeat evaluation as clinically warranted.  Right sacral decubitus ulcer suspected.

## 2020-04-10 NOTE — PROGRESS NOTE ADULT - ATTENDING COMMENTS
GOC: Dr. Reyes had conversation with pt's wife, who agreed to home with home hospice. Will send referral
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
## 2020-04-10 NOTE — GOALS OF CARE CONVERSATION - ADVANCED CARE PLANNING - CONVERSATION DETAILS
[facility]    5:30pm: This writer spoke with pts spouse Claribel Be. Discussed home hospice services.  Pending approval and consents to be sent to the home. [facility] to continue to follow up.      Goldie Villanueva RN  [phone]
Pt. is readmitted for weakness and lethargy, he is awake and oriented to person, not place or time. Called wife Shilpa regarding her husbands recent hospitalizations, and if she thought he should be converted to hospice care. Shilpa stated that this would be a difficult decision for her, and right now was not willing to consider it. I encouraged her to speak to their PCP Dr. John Reyes, about his thoughts. She said she would call Dr. Reyes today. Their HHA Citlalli spoke to writer saying wife has Parkinsons and is having a hard time with her 's recent decline. I will follow up with the wife later today.

## 2020-04-10 NOTE — PROGRESS NOTE ADULT - ATTENDING COMMENTS
I have personally seen and examined patient on the above date.  I discussed the case with Gail Celestin NP and I agree with findings and plan as detailed per note above, which I have amended where appropriate.
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
## 2020-04-10 NOTE — PROGRESS NOTE ADULT - SUBJECTIVE AND OBJECTIVE BOX
Medicine Progress Note    Patient is a 79y old  Male who presents with a chief complaint of Lethargy, dyspnea (08 Apr 2020 10:42)      SUBJECTIVE / OVERNIGHT EVENTS: seen and examined at bedside with palliative RN. patient is very lethargic and confused with soft voice. reports feeling weak, no other complaints.    ROS:  could not obtain as patient was not answering    ADDITIONAL REVIEW OF SYSTEMS:    MEDICATIONS  (STANDING):  baclofen 20 milliGRAM(s) Oral three times a day  cefepime   IVPB      collagenase Ointment 1 Application(s) Topical daily  Dakins Solution - 1/4 Strength 1 Application(s) Topical daily  dextrose 5% + sodium chloride 0.9%. 1000 milliLiter(s) (60 mL/Hr) IV Continuous <Continuous>  dextrose 5% + sodium chloride 0.9%. 250 milliLiter(s) (75 mL/Hr) IV Continuous <Continuous>  enoxaparin Injectable 40 milliGRAM(s) SubCutaneous daily  losartan 50 milliGRAM(s) Oral daily  minocycline 100 milliGRAM(s) Oral daily  potassium chloride  10 mEq/100 mL IVPB 10 milliEquivalent(s) IV Intermittent every 1 hour  vancomycin  IVPB 1000 milliGRAM(s) IV Intermittent once    MEDICATIONS  (PRN):  acetaminophen   Tablet .. 650 milliGRAM(s) Oral every 6 hours PRN Temp greater or equal to 38C (100.4F), Mild Pain (1 - 3), Moderate Pain (4 - 6)  melatonin 3 milliGRAM(s) Oral at bedtime PRN Insomnia  traMADol 25 milliGRAM(s) Oral three times a day PRN Severe Pain (7 - 10)    CAPILLARY BLOOD GLUCOSE        I&O's Summary      PHYSICAL EXAM:    Vital Signs Last 24 Hrs  T(C): 37 (10 Apr 2020 05:47), Max: 37.1 (09 Apr 2020 17:30)  T(F): 98.6 (10 Apr 2020 05:47), Max: 98.8 (09 Apr 2020 17:30)  HR: 71 (10 Apr 2020 05:47) (71 - 71)  BP: 109/57 (10 Apr 2020 05:47) (90/54 - 117/75)  BP(mean): --  RR: 20 (10 Apr 2020 05:47) (20 - 20)  SpO2: 96% (10 Apr 2020 05:47) (94% - 97%)    CONSTITUTIONAL: NAD, well-developed, well-groomed  ENMT: Moist oral mucosa, no pharyngeal injection or exudates; normal dentition  RESPIRATORY: Normal respiratory effort; reduced BLAE. ronchi+  CARDIOVASCULAR: Regular rate and rhythm, normal S1 and S2, no murmur/rub/gallop; No lower extremity edema; Peripheral pulses are 2+ bilaterally  ABDOMEN: Nontender to palpation, normoactive bowel sounds, no rebound/guarding; No hepatosplenomegaly  PSYCH: AAO*1. confused.   NEUROLOGY: unable to check. moving limbs   SKIN: No rashes; no palpable lesions    LABS:                        12.4   15.03 )-----------( 272      ( 10 Apr 2020 08:06 )             38.9   04-10    138  |  97  |  28<H>  ----------------------------<  202<H>  3.9   |  31  |  0.79    Ca    8.7      10 Apr 2020 08:06  Mg     1.8     04-10                          	  Ca    9.1      09 Apr 2020 06:55    TPro  6.1  /  Alb  2.2<L>  /  TBili  0.5  /  DBili  x   /  AST  47<H>  /  ALT  22  /  AlkPhos  99  04-07      CARDIAC MARKERS ( 08 Apr 2020 10:37 )  .194 ng/mL / x     / x     / x     / x      CARDIAC MARKERS ( 08 Apr 2020 03:00 )  .338 ng/mL / x     / x     / x     / x      CARDIAC MARKERS ( 07 Apr 2020 23:20 )  .580 ng/mL / x     / x     / x     / x          COVID-19 PCR: NotDetec (07 Apr 2020 22:45)      RADIOLOGY & ADDITIONAL TESTS:  Imaging from Last 24 Hours:    Electrocardiogram/QTc Interval:    < from: Xray Chest 1 View AP/PA (04.07.20 @ 22:29) >    IMPRESSION:     Cardiomegaly. Clear lungs.    < end of copied text >      COORDINATION OF CARE:  Care Discussed with Consultants/Other Providers: Medicine Progress Note    Patient is a 79y old  Male who presents with a chief complaint of Lethargy, dyspnea (08 Apr 2020 10:42)      SUBJECTIVE / OVERNIGHT EVENTS: No events overnight. I saw and examined patient at bedside this morning, pt said to me "I just want to die, I can't keep going like this anymore"        ADDITIONAL REVIEW OF SYSTEMS:    MEDICATIONS  (STANDING):  baclofen 20 milliGRAM(s) Oral three times a day  cefepime   IVPB      collagenase Ointment 1 Application(s) Topical daily  Dakins Solution - 1/4 Strength 1 Application(s) Topical daily  dextrose 5% + sodium chloride 0.9%. 1000 milliLiter(s) (60 mL/Hr) IV Continuous <Continuous>  dextrose 5% + sodium chloride 0.9%. 250 milliLiter(s) (75 mL/Hr) IV Continuous <Continuous>  enoxaparin Injectable 40 milliGRAM(s) SubCutaneous daily  losartan 50 milliGRAM(s) Oral daily  minocycline 100 milliGRAM(s) Oral daily  potassium chloride  10 mEq/100 mL IVPB 10 milliEquivalent(s) IV Intermittent every 1 hour  vancomycin  IVPB 1000 milliGRAM(s) IV Intermittent once    MEDICATIONS  (PRN):  acetaminophen   Tablet .. 650 milliGRAM(s) Oral every 6 hours PRN Temp greater or equal to 38C (100.4F), Mild Pain (1 - 3), Moderate Pain (4 - 6)  melatonin 3 milliGRAM(s) Oral at bedtime PRN Insomnia  traMADol 25 milliGRAM(s) Oral three times a day PRN Severe Pain (7 - 10)    CAPILLARY BLOOD GLUCOSE        I&O's Summary      PHYSICAL EXAM:    .  VITAL SIGNS:  T(C): 37 (04-10-20 @ 05:47), Max: 37.1 (04-09-20 @ 17:30)  T(F): 98.6 (04-10-20 @ 05:47), Max: 98.8 (04-09-20 @ 17:30)  HR: 71 (04-10-20 @ 05:47) (71 - 71)  BP: 109/57 (04-10-20 @ 05:47) (109/57 - 117/75)  BP(mean): --  RR: 20 (04-10-20 @ 05:47) (20 - 20)  SpO2: 96% (04-10-20 @ 05:47) (94% - 96%)  Wt(kg): --    PHYSICAL EXAM:    Constitutional: elderly male, lying in bed in NAD  Head: NC/AT  Eyes: PERRLA, EOMI, clear conjunctiva  ENT: no nasal discharge; no oropharyngeal erythema or exudates; dry oral mucosa  Neck: supple; no JVD or thyromegaly  Respiratory: CTA B/L; no W/R/R, no retractions  Cardiac: +S1/S2; RRR; no M/R/G; PMI non-displaced  Gastrointestinal: soft, NT/ND; no rebound or guarding; +BS, no hepatosplenomegaly  Extremities: WWP, no clubbing or cyanosis; RUE swelling  Vascular: 2+ radial, DP/PT pulses B/L  Skin: face with dry flaky skin  Lymphatic: no submandibular or cervical LAD  Neurologic: AAOx3; answers questions appropriately, follows commands, moves all extremities, CNII-XII grossly intact; no focal deficits, motor 5/5 in UE and LE, Reflexes 2+ in UE and LE b/l      LABS:                        12.4   15.03 )-----------( 272      ( 10 Apr 2020 08:06 )             38.9   04-10    138  |  97  |  28<H>  ----------------------------<  202<H>  3.9   |  31  |  0.79    Ca    8.7      10 Apr 2020 08:06  Mg     1.8     04-10                          	  Ca    9.1      09 Apr 2020 06:55    TPro  6.1  /  Alb  2.2<L>  /  TBili  0.5  /  DBili  x   /  AST  47<H>  /  ALT  22  /  AlkPhos  99  04-07      CARDIAC MARKERS ( 08 Apr 2020 10:37 )  .194 ng/mL / x     / x     / x     / x      CARDIAC MARKERS ( 08 Apr 2020 03:00 )  .338 ng/mL / x     / x     / x     / x      CARDIAC MARKERS ( 07 Apr 2020 23:20 )  .580 ng/mL / x     / x     / x     / x          COVID-19 PCR: NotDetec (07 Apr 2020 22:45)      RADIOLOGY & ADDITIONAL TESTS:  Imaging from Last 24 Hours:    Electrocardiogram/QTc Interval:    < from: Xray Chest 1 View AP/PA (04.07.20 @ 22:29) >    IMPRESSION:     Cardiomegaly. Clear lungs.    < end of copied text >      COORDINATION OF CARE:  Care Discussed with Consultants/Other Providers: Medicine Progress Note    Patient is a 79y old  Male who presents with a chief complaint of Lethargy, dyspnea (08 Apr 2020 10:42)      SUBJECTIVE / OVERNIGHT EVENTS: No events overnight. I saw and examined patient at bedside this morning, pt said to me "I just want to die, I can't keep going like this anymore"        ADDITIONAL REVIEW OF SYSTEMS:    MEDICATIONS  (STANDING):  baclofen 20 milliGRAM(s) Oral three times a day  cefepime   IVPB      collagenase Ointment 1 Application(s) Topical daily  Dakins Solution - 1/4 Strength 1 Application(s) Topical daily  dextrose 5% + sodium chloride 0.9%. 1000 milliLiter(s) (60 mL/Hr) IV Continuous <Continuous>  dextrose 5% + sodium chloride 0.9%. 250 milliLiter(s) (75 mL/Hr) IV Continuous <Continuous>  enoxaparin Injectable 40 milliGRAM(s) SubCutaneous daily  losartan 50 milliGRAM(s) Oral daily  minocycline 100 milliGRAM(s) Oral daily  potassium chloride  10 mEq/100 mL IVPB 10 milliEquivalent(s) IV Intermittent every 1 hour  vancomycin  IVPB 1000 milliGRAM(s) IV Intermittent once    MEDICATIONS  (PRN):  acetaminophen   Tablet .. 650 milliGRAM(s) Oral every 6 hours PRN Temp greater or equal to 38C (100.4F), Mild Pain (1 - 3), Moderate Pain (4 - 6)  melatonin 3 milliGRAM(s) Oral at bedtime PRN Insomnia  traMADol 25 milliGRAM(s) Oral three times a day PRN Severe Pain (7 - 10)    CAPILLARY BLOOD GLUCOSE        I&O's Summary      PHYSICAL EXAM:    .  VITAL SIGNS:  T(C): 37 (04-10-20 @ 05:47), Max: 37.1 (04-09-20 @ 17:30)  T(F): 98.6 (04-10-20 @ 05:47), Max: 98.8 (04-09-20 @ 17:30)  HR: 71 (04-10-20 @ 05:47) (71 - 71)  BP: 109/57 (04-10-20 @ 05:47) (109/57 - 117/75)  BP(mean): --  RR: 20 (04-10-20 @ 05:47) (20 - 20)  SpO2: 96% (04-10-20 @ 05:47) (94% - 96%)  Wt(kg): --    PHYSICAL EXAM:    Constitutional: elderly male, lying in bed in NAD  Head: NC/AT  Eyes: PERRLA, EOMI, clear conjunctiva  ENT: no nasal discharge; no oropharyngeal erythema or exudates; dry oral mucosa  Neck: supple; no JVD or thyromegaly  Respiratory: CTA B/L; no W/R/R, no retractions  Cardiac: +S1/S2; RRR; no M/R/G; PMI non-displaced  Gastrointestinal: soft, NT/ND; no rebound or guarding; +BS, no hepatosplenomegaly  Extremities: WWP, no clubbing or cyanosis; RUE swelling  Vascular: 2+ radial, DP/PT pulses B/L  Skin: face with dry flaky skin  Neurologic: AAOx2 (oriented to person and place), hypophonia, moves extremities spontaneously      LABS:                        12.4   15.03 )-----------( 272      ( 10 Apr 2020 08:06 )             38.9   04-10    138  |  97  |  28<H>  ----------------------------<  202<H>  3.9   |  31  |  0.79    Ca    8.7      10 Apr 2020 08:06  Mg     1.8     04-10                          	  Ca    9.1      09 Apr 2020 06:55    TPro  6.1  /  Alb  2.2<L>  /  TBili  0.5  /  DBili  x   /  AST  47<H>  /  ALT  22  /  AlkPhos  99  04-07      CARDIAC MARKERS ( 08 Apr 2020 10:37 )  .194 ng/mL / x     / x     / x     / x      CARDIAC MARKERS ( 08 Apr 2020 03:00 )  .338 ng/mL / x     / x     / x     / x      CARDIAC MARKERS ( 07 Apr 2020 23:20 )  .580 ng/mL / x     / x     / x     / x          COVID-19 PCR: NotDetec (07 Apr 2020 22:45)      RADIOLOGY & ADDITIONAL TESTS:  Imaging from Last 24 Hours:    Electrocardiogram/QTc Interval:    < from: Xray Chest 1 View AP/PA (04.07.20 @ 22:29) >    IMPRESSION:     Cardiomegaly. Clear lungs.    < end of copied text >      COORDINATION OF CARE:  Care Discussed with Consultants/Other Providers:
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
## 2020-04-11 LAB
CULTURE RESULTS: NO GROWTH — SIGNIFICANT CHANGE UP
GLUCOSE BLDC GLUCOMTR-MCNC: 158 MG/DL — HIGH (ref 70–99)
GLUCOSE BLDC GLUCOMTR-MCNC: 168 MG/DL — HIGH (ref 70–99)
SPECIMEN SOURCE: SIGNIFICANT CHANGE UP

## 2020-04-11 PROCEDURE — 99232 SBSQ HOSP IP/OBS MODERATE 35: CPT | Mod: GC

## 2020-04-11 PROCEDURE — 93971 EXTREMITY STUDY: CPT | Mod: 26,RT

## 2020-04-11 RX ADMIN — Medication 3 MILLIGRAM(S): at 22:35

## 2020-04-11 RX ADMIN — ATORVASTATIN CALCIUM 20 MILLIGRAM(S): 80 TABLET, FILM COATED ORAL at 22:35

## 2020-04-11 RX ADMIN — MINOCYCLINE HYDROCHLORIDE 100 MILLIGRAM(S): 45 TABLET, EXTENDED RELEASE ORAL at 14:45

## 2020-04-11 RX ADMIN — Medication 1 APPLICATION(S): at 13:19

## 2020-04-11 RX ADMIN — Medication 1: at 17:04

## 2020-04-11 RX ADMIN — LOSARTAN POTASSIUM 50 MILLIGRAM(S): 100 TABLET, FILM COATED ORAL at 04:55

## 2020-04-11 RX ADMIN — ENOXAPARIN SODIUM 40 MILLIGRAM(S): 100 INJECTION SUBCUTANEOUS at 14:45

## 2020-04-11 RX ADMIN — Medication 2: at 12:48

## 2020-04-11 RX ADMIN — CEFEPIME 100 MILLIGRAM(S): 1 INJECTION, POWDER, FOR SOLUTION INTRAMUSCULAR; INTRAVENOUS at 13:18

## 2020-04-11 RX ADMIN — CEFEPIME 100 MILLIGRAM(S): 1 INJECTION, POWDER, FOR SOLUTION INTRAMUSCULAR; INTRAVENOUS at 22:30

## 2020-04-11 RX ADMIN — Medication 1: at 07:54

## 2020-04-11 RX ADMIN — CEFEPIME 100 MILLIGRAM(S): 1 INJECTION, POWDER, FOR SOLUTION INTRAMUSCULAR; INTRAVENOUS at 04:55
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
## 2020-04-11 NOTE — PROGRESS NOTE ADULT - SUBJECTIVE AND OBJECTIVE BOX
Medicine Progress Note    Patient is a 79y old  Male who presents with a chief complaint of Lethargy, dyspnea (08 Apr 2020 10:42)      SUBJECTIVE / OVERNIGHT EVENTS: No events overnight. I saw and examined patient at bedside this morning, pt said to me "I just want to die, I can't keep going like this anymore"        ADDITIONAL REVIEW OF SYSTEMS:    MEDICATIONS  (STANDING):  baclofen 20 milliGRAM(s) Oral three times a day  cefepime   IVPB      collagenase Ointment 1 Application(s) Topical daily  Dakins Solution - 1/4 Strength 1 Application(s) Topical daily  dextrose 5% + sodium chloride 0.9%. 1000 milliLiter(s) (60 mL/Hr) IV Continuous <Continuous>  dextrose 5% + sodium chloride 0.9%. 250 milliLiter(s) (75 mL/Hr) IV Continuous <Continuous>  enoxaparin Injectable 40 milliGRAM(s) SubCutaneous daily  losartan 50 milliGRAM(s) Oral daily  minocycline 100 milliGRAM(s) Oral daily  potassium chloride  10 mEq/100 mL IVPB 10 milliEquivalent(s) IV Intermittent every 1 hour  vancomycin  IVPB 1000 milliGRAM(s) IV Intermittent once    MEDICATIONS  (PRN):  acetaminophen   Tablet .. 650 milliGRAM(s) Oral every 6 hours PRN Temp greater or equal to 38C (100.4F), Mild Pain (1 - 3), Moderate Pain (4 - 6)  melatonin 3 milliGRAM(s) Oral at bedtime PRN Insomnia  traMADol 25 milliGRAM(s) Oral three times a day PRN Severe Pain (7 - 10)    CAPILLARY BLOOD GLUCOSE        I&O's Summary      PHYSICAL EXAM:    .  VITAL SIGNS:  T(C): 37 (04-10-20 @ 05:47), Max: 37.1 (04-09-20 @ 17:30)  T(F): 98.6 (04-10-20 @ 05:47), Max: 98.8 (04-09-20 @ 17:30)  HR: 71 (04-10-20 @ 05:47) (71 - 71)  BP: 109/57 (04-10-20 @ 05:47) (109/57 - 117/75)  BP(mean): --  RR: 20 (04-10-20 @ 05:47) (20 - 20)  SpO2: 96% (04-10-20 @ 05:47) (94% - 96%)  Wt(kg): --    PHYSICAL EXAM:    Constitutional: elderly male, lying in bed in NAD  Head: NC/AT  Eyes: PERRLA, EOMI, clear conjunctiva  ENT: no nasal discharge; no oropharyngeal erythema or exudates; dry oral mucosa  Neck: supple; no JVD or thyromegaly  Respiratory: CTA B/L; no W/R/R, no retractions  Cardiac: +S1/S2; RRR; no M/R/G; PMI non-displaced  Gastrointestinal: soft, NT/ND; no rebound or guarding; +BS, no hepatosplenomegaly  Extremities: WWP, no clubbing or cyanosis; RUE swelling  Vascular: 2+ radial, DP/PT pulses B/L  Skin: face with dry flaky skin  Neurologic: AAOx2 (oriented to person and place), hypophonia, moves extremities spontaneously      LABS:                        12.4   15.03 )-----------( 272      ( 10 Apr 2020 08:06 )             38.9   04-10    138  |  97  |  28<H>  ----------------------------<  202<H>  3.9   |  31  |  0.79    Ca    8.7      10 Apr 2020 08:06  Mg     1.8     04-10                          	  Ca    9.1      09 Apr 2020 06:55    TPro  6.1  /  Alb  2.2<L>  /  TBili  0.5  /  DBili  x   /  AST  47<H>  /  ALT  22  /  AlkPhos  99  04-07      CARDIAC MARKERS ( 08 Apr 2020 10:37 )  .194 ng/mL / x     / x     / x     / x      CARDIAC MARKERS ( 08 Apr 2020 03:00 )  .338 ng/mL / x     / x     / x     / x      CARDIAC MARKERS ( 07 Apr 2020 23:20 )  .580 ng/mL / x     / x     / x     / x          COVID-19 PCR: NotDetec (07 Apr 2020 22:45)      RADIOLOGY & ADDITIONAL TESTS:  Imaging from Last 24 Hours:    Electrocardiogram/QTc Interval:    < from: Xray Chest 1 View AP/PA (04.07.20 @ 22:29) >    IMPRESSION:     Cardiomegaly. Clear lungs.    < end of copied text >      COORDINATION OF CARE:  Care Discussed with Consultants/Other Providers: Medicine Progress Note    Patient is a 79y old  Male who presents with a chief complaint of Lethargy, dyspnea (08 Apr 2020 10:42)      SUBJECTIVE / OVERNIGHT EVENTS: No events overnight. I saw and examined patient at bedside this morning, pt said to me "I just want to die, I can't keep going like this anymore"        ADDITIONAL REVIEW OF SYSTEMS:    MEDICATIONS  (STANDING):  baclofen 20 milliGRAM(s) Oral three times a day  cefepime   IVPB      collagenase Ointment 1 Application(s) Topical daily  Dakins Solution - 1/4 Strength 1 Application(s) Topical daily  dextrose 5% + sodium chloride 0.9%. 1000 milliLiter(s) (60 mL/Hr) IV Continuous <Continuous>  dextrose 5% + sodium chloride 0.9%. 250 milliLiter(s) (75 mL/Hr) IV Continuous <Continuous>  enoxaparin Injectable 40 milliGRAM(s) SubCutaneous daily  losartan 50 milliGRAM(s) Oral daily  minocycline 100 milliGRAM(s) Oral daily  potassium chloride  10 mEq/100 mL IVPB 10 milliEquivalent(s) IV Intermittent every 1 hour  vancomycin  IVPB 1000 milliGRAM(s) IV Intermittent once    MEDICATIONS  (PRN):  acetaminophen   Tablet .. 650 milliGRAM(s) Oral every 6 hours PRN Temp greater or equal to 38C (100.4F), Mild Pain (1 - 3), Moderate Pain (4 - 6)  melatonin 3 milliGRAM(s) Oral at bedtime PRN Insomnia  traMADol 25 milliGRAM(s) Oral three times a day PRN Severe Pain (7 - 10)    CAPILLARY BLOOD GLUCOSE        I&O's Summary      PHYSICAL EXAM:    .  Vital Signs Last 24 Hrs  T(C): 36.7 (11 Apr 2020 04:24), Max: 36.9 (10 Apr 2020 16:12)  T(F): 98.1 (11 Apr 2020 04:24), Max: 98.5 (10 Apr 2020 16:12)  HR: 67 (11 Apr 2020 04:24) (67 - 70)  BP: 145/68 (11 Apr 2020 04:24) (112/59 - 145/68)  BP(mean): --  RR: 20 (11 Apr 2020 04:24) (20 - 20)  SpO2: 100% (11 Apr 2020 04:24) (97% - 100%)  PHYSICAL EXAM:    Constitutional: elderly male, lying in bed in NAD A & O x 0  Head: NC/AT  Eyes: PERRLA, EOMI, clear conjunctiva  ENT: no nasal discharge; no oropharyngeal erythema or exudates; dry oral mucosa           Neck: supple; no JVD or thyromegaly  Respiratory: decrease in lung sounds  B/L; no W/R/R, no retractions  Cardiac: +S1/S2; RRR; 2/6 systolic murmur,  PMI non-displaced  Gastrointestinal: soft, NT/ND; no rebound or guarding; +BS, no hepatosplenomegaly  Extremities: WWP, no clubbing or cyanosis; no lower extremity edema  Vascular: 2+ radial, DP/PT pulses B/L  Skin: face with dry flaky skin  Neurologic: AAOx2 (oriented to person and place), hypophonia, moves extremities spontaneously      LABS:                        12.4   15.03 )-----------( 272      ( 10 Apr 2020 08:06 )             38.9   04-10    138  |  97  |  28<H>  ----------------------------<  202<H>  3.9   |  31  |  0.79    Ca    8.7      10 Apr 2020 08:06  Mg     1.8     04-10                           	  Ca    9.1      09 Apr 2020 06:55    TPro  6.1  /  Alb  2.2<L>  /  TBili  0.5  /  DBili  x   /  AST  47<H>  /  ALT  22  /  AlkPhos  99  04-07      CARDIAC MARKERS ( 08 Apr 2020 10:37 )  .194 ng/mL / x     / x     / x     / x      CARDIAC MARKERS ( 08 Apr 2020 03:00 )  .338 ng/mL / x     / x     / x     / x      CARDIAC MARKERS ( 07 Apr 2020 23:20 )  .580 ng/mL / x     / x     / x     / x          COVID-19 PCR: NotDetec (07 Apr 2020 22:45)      RADIOLOGY & ADDITIONAL TESTS:  Imaging from Last 24 Hours:    Electrocardiogram/QTc Interval:    < from: Xray Chest 1 View AP/PA (04.07.20 @ 22:29) >    IMPRESSION:     Cardiomegaly. Clear lungs.    < end of copied text >      COORDINATION OF CARE:  Care Discussed with Consultants/Other Providers: Medicine Progress Note    Patient is a 79y old  Male who presents with a chief complaint of Lethargy, dyspnea (08 Apr 2020 10:42)      SUBJECTIVE / OVERNIGHT EVENTS: No events overnight. I saw and examined patient at bedside this morning, pt said to me "I just want to die, I can't keep going like this anymore"        ADDITIONAL REVIEW OF SYSTEMS:    MEDICATIONS  (STANDING):  baclofen 20 milliGRAM(s) Oral three times a day  cefepime   IVPB      collagenase Ointment 1 Application(s) Topical daily  Dakins Solution - 1/4 Strength 1 Application(s) Topical daily  dextrose 5% + sodium chloride 0.9%. 1000 milliLiter(s) (60 mL/Hr) IV Continuous <Continuous>  dextrose 5% + sodium chloride 0.9%. 250 milliLiter(s) (75 mL/Hr) IV Continuous <Continuous>  enoxaparin Injectable 40 milliGRAM(s) SubCutaneous daily  losartan 50 milliGRAM(s) Oral daily  minocycline 100 milliGRAM(s) Oral daily  potassium chloride  10 mEq/100 mL IVPB 10 milliEquivalent(s) IV Intermittent every 1 hour  vancomycin  IVPB 1000 milliGRAM(s) IV Intermittent once    MEDICATIONS  (PRN):  acetaminophen   Tablet .. 650 milliGRAM(s) Oral every 6 hours PRN Temp greater or equal to 38C (100.4F), Mild Pain (1 - 3), Moderate Pain (4 - 6)  melatonin 3 milliGRAM(s) Oral at bedtime PRN Insomnia  traMADol 25 milliGRAM(s) Oral three times a day PRN Severe Pain (7 - 10)    CAPILLARY BLOOD GLUCOSE        I&O's Summary      PHYSICAL EXAM:    .  Vital Signs Last 24 Hrs  T(C): 36.7 (11 Apr 2020 04:24), Max: 36.9 (10 Apr 2020 16:12)  T(F): 98.1 (11 Apr 2020 04:24), Max: 98.5 (10 Apr 2020 16:12)  HR: 67 (11 Apr 2020 04:24) (67 - 70)  BP: 145/68 (11 Apr 2020 04:24) (112/59 - 145/68)  BP(mean): --  RR: 20 (11 Apr 2020 04:24) (20 - 20)  SpO2: 100% (11 Apr 2020 04:24) (97% - 100%)  PHYSICAL EXAM:    Constitutional: elderly male, lying in bed in NAD A & O x 0  Head: NC/AT  Eyes: PERRLA, EOMI, clear conjunctiva  ENT: no nasal discharge; no oropharyngeal erythema or exudates; dry oral mucosa           Neck: supple; no JVD or thyromegaly  Respiratory: decrease in lung sounds  B/L; no W/R/R, no retractions  Cardiac: +S1/S2; RRR; 2/6 systolic murmur,  PMI non-displaced  Gastrointestinal: soft, NT/ND; no rebound or guarding; +BS, no hepatosplenomegaly  Extremities: WWP, no clubbing or cyanosis; no lower extremity edema  Vascular: 2+ radial, DP/PT pulses B/L  Skin: face with dry flaky skin  Neurologic: AAOx2 (oriented to person and place), hypophonia, moves extremities spontaneously  Patient examined at bedside by Dr Zee      LABS:                        12.4   15.03 )-----------( 272      ( 10 Apr 2020 08:06 )             38.9   04-10    138  |  97  |  28<H>  ----------------------------<  202<H>  3.9   |  31  |  0.79    Ca    8.7      10 Apr 2020 08:06  Mg     1.8     04-10                           	  Ca    9.1      09 Apr 2020 06:55    TPro  6.1  /  Alb  2.2<L>  /  TBili  0.5  /  DBili  x   /  AST  47<H>  /  ALT  22  /  AlkPhos  99  04-07      CARDIAC MARKERS ( 08 Apr 2020 10:37 )  .194 ng/mL / x     / x     / x     / x      CARDIAC MARKERS ( 08 Apr 2020 03:00 )  .338 ng/mL / x     / x     / x     / x      CARDIAC MARKERS ( 07 Apr 2020 23:20 )  .580 ng/mL / x     / x     / x     / x          COVID-19 PCR: NotDetec (07 Apr 2020 22:45)      RADIOLOGY & ADDITIONAL TESTS:  Imaging from Last 24 Hours:    Electrocardiogram/QTc Interval:    < from: Xray Chest 1 View AP/PA (04.07.20 @ 22:29) >    IMPRESSION:     Cardiomegaly. Clear lungs.    < end of copied text >      COORDINATION OF CARE:  Care Discussed with Consultants/Other Providers: Medicine Progress Note    Patient is a 79y old  Male who presents with a chief complaint of Lethargy, dyspnea (08 Apr 2020 10:42)      SUBJECTIVE / OVERNIGHT EVENTS: No events overnight. I saw and examined patient at bedside this morning, pt said to me "I just want to die, I can't keep going like this anymore"        ADDITIONAL REVIEW OF SYSTEMS:    MEDICATIONS  (STANDING):  baclofen 20 milliGRAM(s) Oral three times a day  cefepime   IVPB      collagenase Ointment 1 Application(s) Topical daily  Dakins Solution - 1/4 Strength 1 Application(s) Topical daily  dextrose 5% + sodium chloride 0.9%. 1000 milliLiter(s) (60 mL/Hr) IV Continuous <Continuous>  dextrose 5% + sodium chloride 0.9%. 250 milliLiter(s) (75 mL/Hr) IV Continuous <Continuous>  enoxaparin Injectable 40 milliGRAM(s) SubCutaneous daily  losartan 50 milliGRAM(s) Oral daily  minocycline 100 milliGRAM(s) Oral daily  potassium chloride  10 mEq/100 mL IVPB 10 milliEquivalent(s) IV Intermittent every 1 hour  vancomycin  IVPB 1000 milliGRAM(s) IV Intermittent once    MEDICATIONS  (PRN):  acetaminophen   Tablet .. 650 milliGRAM(s) Oral every 6 hours PRN Temp greater or equal to 38C (100.4F), Mild Pain (1 - 3), Moderate Pain (4 - 6)  melatonin 3 milliGRAM(s) Oral at bedtime PRN Insomnia  traMADol 25 milliGRAM(s) Oral three times a day PRN Severe Pain (7 - 10)    CAPILLARY BLOOD GLUCOSE        I&O's Summary      PHYSICAL EXAM:    .  Vital Signs Last 24 Hrs  T(C): 36.7 (11 Apr 2020 04:24), Max: 36.9 (10 Apr 2020 16:12)  T(F): 98.1 (11 Apr 2020 04:24), Max: 98.5 (10 Apr 2020 16:12)  HR: 67 (11 Apr 2020 04:24) (67 - 70)  BP: 145/68 (11 Apr 2020 04:24) (112/59 - 145/68)  BP(mean): --  RR: 20 (11 Apr 2020 04:24) (20 - 20)  SpO2: 100% (11 Apr 2020 04:24) (97% - 100%)  PHYSICAL EXAM:    Constitutional: elderly male, lying in bed in NAD A & O x 0  Head: NC/AT  Eyes: PERRLA, EOMI, clear conjunctiva  ENT: no nasal discharge; no oropharyngeal erythema or exudates; dry oral mucosa           Neck: supple; no JVD or thyromegaly  Respiratory: decrease in lung sounds  B/L; no W/R/R, no retractions  Cardiac: +S1/S2; RRR; 2/6 systolic murmur,  PMI non-displaced  Gastrointestinal: soft, NT/ND; no rebound or guarding; +BS, no hepatosplenomegaly  Extremities: WWP, no clubbing or cyanosis; no lower extremity edema  Vascular: 2+ radial, DP/PT pulses B/L  Skin: face with dry flaky skin  Neurologic: AAOx1 hypophonia, moves extremities spontaneously  Patient examined at bedside by Dr Haddad      LABS:                        12.4   15.03 )-----------( 272      ( 10 Apr 2020 08:06 )             38.9   04-10    138  |  97  |  28<H>  ----------------------------<  202<H>  3.9   |  31  |  0.79    Ca    8.7      10 Apr 2020 08:06  Mg     1.8     04-10                           	  Ca    9.1      09 Apr 2020 06:55    TPro  6.1  /  Alb  2.2<L>  /  TBili  0.5  /  DBili  x   /  AST  47<H>  /  ALT  22  /  AlkPhos  99  04-07      CARDIAC MARKERS ( 08 Apr 2020 10:37 )  .194 ng/mL / x     / x     / x     / x      CARDIAC MARKERS ( 08 Apr 2020 03:00 )  .338 ng/mL / x     / x     / x     / x      CARDIAC MARKERS ( 07 Apr 2020 23:20 )  .580 ng/mL / x     / x     / x     / x          COVID-19 PCR: NotDetec (07 Apr 2020 22:45)      RADIOLOGY & ADDITIONAL TESTS:  Imaging from Last 24 Hours:    Electrocardiogram/QTc Interval:    < from: Xray Chest 1 View AP/PA (04.07.20 @ 22:29) >    IMPRESSION:     Cardiomegaly. Clear lungs.    < end of copied text >      COORDINATION OF CARE:  Care Discussed with Consultants/Other Providers:

## 2020-04-11 NOTE — PROGRESS NOTE ADULT - ATTENDING COMMENTS
GOC: Dr. Reyes had conversation with pt's wife, who agreed to home with home hospice. Will send referral I have personally seen and examined patient on the above date.  I discussed the case with Ms. VictorJeanette and I agree with findings and plan as detailed per note above, which I have amended where appropriate.

## 2020-04-12 ENCOUNTER — TRANSCRIPTION ENCOUNTER (OUTPATIENT)
Age: 80
End: 2020-04-12

## 2020-04-12 VITALS
DIASTOLIC BLOOD PRESSURE: 67 MMHG | HEART RATE: 68 BPM | RESPIRATION RATE: 19 BRPM | SYSTOLIC BLOOD PRESSURE: 131 MMHG | TEMPERATURE: 98 F | OXYGEN SATURATION: 96 %

## 2020-04-12 LAB
ALBUMIN SERPL ELPH-MCNC: 2.1 G/DL — LOW (ref 3.3–5)
ALP SERPL-CCNC: 92 U/L — SIGNIFICANT CHANGE UP (ref 40–120)
ALT FLD-CCNC: 16 U/L — SIGNIFICANT CHANGE UP (ref 10–45)
ANION GAP SERPL CALC-SCNC: 7 MMOL/L — SIGNIFICANT CHANGE UP (ref 5–17)
AST SERPL-CCNC: 19 U/L — SIGNIFICANT CHANGE UP (ref 10–40)
BASOPHILS # BLD AUTO: 0.03 K/UL — SIGNIFICANT CHANGE UP (ref 0–0.2)
BASOPHILS NFR BLD AUTO: 0.2 % — SIGNIFICANT CHANGE UP (ref 0–2)
BILIRUB SERPL-MCNC: 0.4 MG/DL — SIGNIFICANT CHANGE UP (ref 0.2–1.2)
BUN SERPL-MCNC: 28 MG/DL — HIGH (ref 7–23)
CALCIUM SERPL-MCNC: 8.7 MG/DL — SIGNIFICANT CHANGE UP (ref 8.4–10.5)
CHLORIDE SERPL-SCNC: 101 MMOL/L — SIGNIFICANT CHANGE UP (ref 96–108)
CO2 SERPL-SCNC: 34 MMOL/L — HIGH (ref 22–31)
CREAT SERPL-MCNC: 0.82 MG/DL — SIGNIFICANT CHANGE UP (ref 0.5–1.3)
EOSINOPHIL # BLD AUTO: 0.35 K/UL — SIGNIFICANT CHANGE UP (ref 0–0.5)
EOSINOPHIL NFR BLD AUTO: 2.7 % — SIGNIFICANT CHANGE UP (ref 0–6)
GLUCOSE BLDC GLUCOMTR-MCNC: 142 MG/DL — HIGH (ref 70–99)
GLUCOSE BLDC GLUCOMTR-MCNC: 161 MG/DL — HIGH (ref 70–99)
GLUCOSE BLDC GLUCOMTR-MCNC: 182 MG/DL — HIGH (ref 70–99)
GLUCOSE SERPL-MCNC: 154 MG/DL — HIGH (ref 70–99)
HCT VFR BLD CALC: 33.4 % — LOW (ref 39–50)
HGB BLD-MCNC: 10.7 G/DL — LOW (ref 13–17)
IMM GRANULOCYTES NFR BLD AUTO: 0.7 % — SIGNIFICANT CHANGE UP (ref 0–1.5)
LYMPHOCYTES # BLD AUTO: 0.67 K/UL — LOW (ref 1–3.3)
LYMPHOCYTES # BLD AUTO: 5.2 % — LOW (ref 13–44)
MCHC RBC-ENTMCNC: 27 PG — SIGNIFICANT CHANGE UP (ref 27–34)
MCHC RBC-ENTMCNC: 32 GM/DL — SIGNIFICANT CHANGE UP (ref 32–36)
MCV RBC AUTO: 84.1 FL — SIGNIFICANT CHANGE UP (ref 80–100)
MONOCYTES # BLD AUTO: 0.78 K/UL — SIGNIFICANT CHANGE UP (ref 0–0.9)
MONOCYTES NFR BLD AUTO: 6 % — SIGNIFICANT CHANGE UP (ref 2–14)
NEUTROPHILS # BLD AUTO: 11 K/UL — HIGH (ref 1.8–7.4)
NEUTROPHILS NFR BLD AUTO: 85.2 % — HIGH (ref 43–77)
NRBC # BLD: 0 /100 WBCS — SIGNIFICANT CHANGE UP (ref 0–0)
PLATELET # BLD AUTO: 323 K/UL — SIGNIFICANT CHANGE UP (ref 150–400)
POTASSIUM SERPL-MCNC: 3 MMOL/L — LOW (ref 3.5–5.3)
POTASSIUM SERPL-SCNC: 3 MMOL/L — LOW (ref 3.5–5.3)
PROT SERPL-MCNC: 6.2 G/DL — SIGNIFICANT CHANGE UP (ref 6–8.3)
RBC # BLD: 3.97 M/UL — LOW (ref 4.2–5.8)
RBC # FLD: 15.5 % — HIGH (ref 10.3–14.5)
SODIUM SERPL-SCNC: 142 MMOL/L — SIGNIFICANT CHANGE UP (ref 135–145)
WBC # BLD: 12.92 K/UL — HIGH (ref 3.8–10.5)
WBC # FLD AUTO: 12.92 K/UL — HIGH (ref 3.8–10.5)

## 2020-04-12 PROCEDURE — 99238 HOSP IP/OBS DSCHRG MGMT 30/<: CPT

## 2020-04-12 RX ORDER — MINOCYCLINE HYDROCHLORIDE 45 MG/1
1 TABLET, EXTENDED RELEASE ORAL
Qty: 30 | Refills: 0
Start: 2020-04-12 | End: 2020-05-11

## 2020-04-12 RX ORDER — BUMETANIDE 0.25 MG/ML
1 INJECTION INTRAMUSCULAR; INTRAVENOUS
Qty: 0 | Refills: 0 | DISCHARGE

## 2020-04-12 RX ORDER — ASPIRIN/CALCIUM CARB/MAGNESIUM 324 MG
1 TABLET ORAL
Qty: 30 | Refills: 0
Start: 2020-04-12 | End: 2020-05-11

## 2020-04-12 RX ADMIN — CEFEPIME 100 MILLIGRAM(S): 1 INJECTION, POWDER, FOR SOLUTION INTRAMUSCULAR; INTRAVENOUS at 13:05

## 2020-04-12 RX ADMIN — Medication 1: at 08:58

## 2020-04-12 RX ADMIN — ENOXAPARIN SODIUM 40 MILLIGRAM(S): 100 INJECTION SUBCUTANEOUS at 12:30

## 2020-04-12 RX ADMIN — TRAMADOL HYDROCHLORIDE 25 MILLIGRAM(S): 50 TABLET ORAL at 00:35

## 2020-04-12 RX ADMIN — Medication 1 APPLICATION(S): at 12:29

## 2020-04-12 RX ADMIN — CEFEPIME 100 MILLIGRAM(S): 1 INJECTION, POWDER, FOR SOLUTION INTRAMUSCULAR; INTRAVENOUS at 05:03

## 2020-04-12 RX ADMIN — Medication 1: at 11:50

## 2020-04-12 RX ADMIN — MINOCYCLINE HYDROCHLORIDE 100 MILLIGRAM(S): 45 TABLET, EXTENDED RELEASE ORAL at 12:30

## 2020-04-12 RX ADMIN — Medication 1 APPLICATION(S): at 12:30

## 2020-04-12 RX ADMIN — LOSARTAN POTASSIUM 50 MILLIGRAM(S): 100 TABLET, FILM COATED ORAL at 05:02

## 2020-04-12 NOTE — DISCHARGE NOTE PROVIDER - NSDCFUSCHEDAPPT_GEN_ALL_CORE_FT
JERICA MAC ; 04/21/2020 ; NPP [facility] JERICA MAC ; 04/21/2020 ; NPP Card[facility]

## 2020-04-12 NOTE — CHART NOTE - NSCHARTNOTEFT_GEN_A_CORE
spoke with case management don in regards to d/c pending oxygen being delivery being today or tomorrow.

## 2020-04-12 NOTE — DISCHARGE NOTE PROVIDER - CARE PROVIDER_API CALL
Reyes, John A (MD)  Internal Medicine  [address], [address]  [place] [address]  Phone: [phone]  Fax: [phone]  Established Patient  Follow Up Time:

## 2020-04-12 NOTE — DISCHARGE NOTE NURSING/CASE MANAGEMENT/SOCIAL WORK - PATIENT PORTAL LINK FT
You can access the FollowMyHealth Patient Portal offered by [facility] by registering at the following website: [email or web address]. By joining [employer]’s FollowMyHealth portal, you will also be able to view your health information using other applications (apps) compatible with our system.

## 2020-04-12 NOTE — DISCHARGE NOTE PROVIDER - NSDCCPCAREPLAN_GEN_ALL_CORE_FT
PRINCIPAL DISCHARGE DIAGNOSIS  Diagnosis: NSTEMI (non-ST elevated myocardial infarction)  Assessment and Plan of Treatment: - continue Aspirin  - cardiology consulted: conservative management, he is not a candidate for invasive cardiac testing  - pt will be sent on home hospice.        SECONDARY DISCHARGE DIAGNOSES  Diagnosis: Diastolic CHF, chronic  Assessment and Plan of Treatment: - continue losartan  - diurese PRN with bumex for edema (he has reported Lasix allergy --> rash)  - pt has tolerated bumex in past  - pt will be sent on home hospice      Diagnosis: Chronic ulcer of sacral region  Assessment and Plan of Treatment: - continue minocycline for chronic suppression  -pt will be sent on home hospice PRINCIPAL DISCHARGE DIAGNOSIS  Diagnosis: Metabolic encephalopathy  Assessment and Plan of Treatment: you were admitted for lethargy and shortness of breath  unclear if underlying infection (possible wound/ decub infection), mental status improved  you will be sent for home hospice      SECONDARY DISCHARGE DIAGNOSES  Diagnosis: Demand ischemia  Assessment and Plan of Treatment: Elevated troponin    2/2 to demand ischemia  Cardiology conservative management - no candidate for cardiac testing      Diagnosis: Chronic ulcer of sacral region  Assessment and Plan of Treatment: - continue minocycline for chronic suppression  -pt will be sent on home hospice      Diagnosis: Diastolic CHF, chronic  Assessment and Plan of Treatment: - continue losartan  - diurese PRN with bumex for edema (he has reported Lasix allergy --> rash)  - pt has tolerated bumex in past  - pt will be sent on home hospice

## 2020-04-12 NOTE — DISCHARGE NOTE PROVIDER - NSDCMRMEDTOKEN_GEN_ALL_CORE_FT
aspirin 81 mg oral delayed release tablet: 1 tab(s) orally once a day   baclofen 20 mg oral tablet: 1 tab(s) orally 3 times a day  Bumex 1 mg oral tablet: 1 tab(s) orally once a day PRN FOR EDEMA  collagenase 250 units/g topical ointment: 1 application topically once a day  losartan 50 mg oral tablet: 1 tab(s) orally once a day  melatonin 3 mg oral tablet: 1 tab(s) orally once a day (at bedtime), As needed, Insomnia  minocycline 100 mg oral tablet: 1 tab(s) orally once a day  sodium hypochlorite 0.125% topical solution: 1 application topically once a day
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
## 2020-04-21 ENCOUNTER — APPOINTMENT (OUTPATIENT)
Dept: CARDIOLOGY | Facility: CLINIC | Age: 80
End: 2020-04-21
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
## 2020-04-30 PROCEDURE — 87086 URINE CULTURE/COLONY COUNT: CPT

## 2020-04-30 PROCEDURE — 96361 HYDRATE IV INFUSION ADD-ON: CPT

## 2020-04-30 PROCEDURE — 84484 ASSAY OF TROPONIN QUANT: CPT

## 2020-04-30 PROCEDURE — 36415 COLL VENOUS BLD VENIPUNCTURE: CPT

## 2020-04-30 PROCEDURE — 87635 SARS-COV-2 COVID-19 AMP PRB: CPT

## 2020-04-30 PROCEDURE — 51701 INSERT BLADDER CATHETER: CPT

## 2020-04-30 PROCEDURE — 87040 BLOOD CULTURE FOR BACTERIA: CPT

## 2020-04-30 PROCEDURE — 93970 EXTREMITY STUDY: CPT

## 2020-04-30 PROCEDURE — 74176 CT ABD & PELVIS W/O CONTRAST: CPT

## 2020-04-30 PROCEDURE — 81001 URINALYSIS AUTO W/SCOPE: CPT

## 2020-04-30 PROCEDURE — 96375 TX/PRO/DX INJ NEW DRUG ADDON: CPT | Mod: XU

## 2020-04-30 PROCEDURE — 82962 GLUCOSE BLOOD TEST: CPT

## 2020-04-30 PROCEDURE — 99285 EMERGENCY DEPT VISIT HI MDM: CPT | Mod: 25

## 2020-04-30 PROCEDURE — 36000 PLACE NEEDLE IN VEIN: CPT

## 2020-04-30 PROCEDURE — 92610 EVALUATE SWALLOWING FUNCTION: CPT

## 2020-04-30 PROCEDURE — 86140 C-REACTIVE PROTEIN: CPT

## 2020-04-30 PROCEDURE — 71045 X-RAY EXAM CHEST 1 VIEW: CPT

## 2020-04-30 PROCEDURE — 83735 ASSAY OF MAGNESIUM: CPT

## 2020-04-30 PROCEDURE — 85730 THROMBOPLASTIN TIME PARTIAL: CPT

## 2020-04-30 PROCEDURE — 96365 THER/PROPH/DIAG IV INF INIT: CPT | Mod: XU

## 2020-04-30 PROCEDURE — 85027 COMPLETE CBC AUTOMATED: CPT

## 2020-04-30 PROCEDURE — 83615 LACTATE (LD) (LDH) ENZYME: CPT

## 2020-04-30 PROCEDURE — 82550 ASSAY OF CK (CPK): CPT

## 2020-04-30 PROCEDURE — 93971 EXTREMITY STUDY: CPT

## 2020-04-30 PROCEDURE — 85610 PROTHROMBIN TIME: CPT

## 2020-04-30 PROCEDURE — 80202 ASSAY OF VANCOMYCIN: CPT

## 2020-04-30 PROCEDURE — 83880 ASSAY OF NATRIURETIC PEPTIDE: CPT

## 2020-04-30 PROCEDURE — 84145 PROCALCITONIN (PCT): CPT

## 2020-04-30 PROCEDURE — 84134 ASSAY OF PREALBUMIN: CPT

## 2020-04-30 PROCEDURE — 93005 ELECTROCARDIOGRAM TRACING: CPT

## 2020-04-30 PROCEDURE — 80053 COMPREHEN METABOLIC PANEL: CPT

## 2020-04-30 PROCEDURE — 87631 RESP VIRUS 3-5 TARGETS: CPT

## 2020-04-30 PROCEDURE — 80048 BASIC METABOLIC PNL TOTAL CA: CPT

## 2020-04-30 PROCEDURE — 83605 ASSAY OF LACTIC ACID: CPT

## 2020-04-30 PROCEDURE — 92526 ORAL FUNCTION THERAPY: CPT

## 2020-10-27 NOTE — ED PROVIDER NOTE - CADM POA CENTRAL LINE
no lesions,  no deformities,  no traumatic injuries,  no significant scars are present,  chest wall non-tender,  no masses present, breathing is unlabored without accessory muscle use,normal breath sounds
No

## 2021-02-17 NOTE — PATIENT PROFILE ADULT - NSTRANSFERBELONGINGSRESP_GEN_A_NUR
PT: Sunshine Woods  DATE: 2/17/2021    Chief Complaint   Patient presents with   • Follow-up     Pt here for f/u       HPI: Sunshine Woosd is a 30 year old female who presents for      Follow-up on lab test and thyroid ultrasound  Patient's thyroid function tests are in the normal range, TPO antibodies positive, has had a full thyroid work-up approximately 2 to 3 years prior had seen an endocrinologist at that time told that everything was normal, maternal history of thyroid dysfunction noted.  Patient does report history of possible hyperthyroidism few years prior.  Currently denies any issues with abnormal weight gain, increased anxiety, hair or skin changes.  She does report her diet could be better.  Thyroid ultrasound 2/3/21 indicates enlargement without any discrete nodules or masses.    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      Past Medical History:   Diagnosis Date   • Depression        Outpatient Medications Marked as Taking for the 2/17/21 encounter (Office Visit) with Christopher Sagastume MD   Medication Sig Dispense Refill   • Multiple Vitamins-Minerals (MULTIVITAMIN ADULT PO)      • Ascorbic Acid (vitamin C) 1000 MG tablet Take 1,000 mg by mouth daily.         ALLERGIES:  No Known Allergies    Review of Systems   Constitutional: Negative for chills, fever and weight loss.   HENT: Negative for ear pain, hearing loss and sore throat.    Eyes: Negative for blurred vision, pain and discharge.   Respiratory: Negative for cough, hemoptysis, sputum production and shortness of breath.    Cardiovascular: Negative for chest pain, palpitations, orthopnea and leg swelling.   Gastrointestinal: Negative for abdominal pain, blood in stool, nausea and vomiting.   Genitourinary: Negative for dysuria and urgency.   Skin:  Negative for rash.   Neurological: Negative for loss of consciousness, weakness and headaches.   Endo/Heme/Allergies: Does not bruise/bleed easily.        Physical Exam    Visit Vitals  /80 (BP Location: LUE - Left upper extremity)   Pulse 78   Temp 97.7 °F (36.5 °C)   Resp 16   Ht 5' 9\" (1.753 m)   Wt 96.2 kg (212 lb 1.3 oz)   LMP 02/13/2021 (Approximate)   BMI 31.32 kg/m²     Physical Exam  Vitals signs reviewed.   Constitutional:       Appearance: Normal appearance. She is normal weight.   Neurological:      Mental Status: She is alert.         No visits with results within 1 Day(s) from this visit.   Latest known visit with results is:   Lab Services on 01/22/2021   Component Date Value Ref Range Status   • COLOR, URINALYSIS 01/22/2021 Yellow   Final   • APPEARANCE, URINALYSIS 01/22/2021 Clear   Final   • GLUCOSE, URINALYSIS 01/22/2021 Negative  Negative mg/dL Final   • BILIRUBIN, URINALYSIS 01/22/2021 Negative  Negative Final   • KETONES, URINALYSIS 01/22/2021 Negative  Negative mg/dL Final   • SPECIFIC GRAVITY, URINALYSIS 01/22/2021 1.011  1.005 - 1.030 Final   • OCCULT BLOOD, URINALYSIS 01/22/2021 Small* Negative Final   • PH, URINALYSIS 01/22/2021 7.0  5.0 - 7.0 Final   • PROTEIN, URINALYSIS 01/22/2021 Negative  Negative mg/dL Final   • UROBILINOGEN, URINALYSIS 01/22/2021 0.2  0.2, 1.0 mg/dL Final   • NITRITE, URINALYSIS 01/22/2021 Negative  Negative Final   • LEUKOCYTE ESTERASE, URINALYSIS 01/22/2021 Negative  Negative Final   • SQUAMOUS EPITHELIAL, URINALYSIS 01/22/2021 1 to 5  None Seen, 1 to 5 /hpf Final   • ERYTHROCYTES, URINALYSIS 01/22/2021 1 to 2  None Seen, 1 to 2 /hpf Final   • LEUKOCYTES, URINALYSIS 01/22/2021 1 to 5  None Seen, 1 to 5 /hpf Final   • BACTERIA, URINALYSIS 01/22/2021 None Seen  None Seen /hpf Final   • HYALINE CASTS, URINALYSIS 01/22/2021 None Seen  None Seen, 1 to 5 /lpf Final   • MUCUS 01/22/2021 Present   Final   • Chlamydia trachomatis by Nucleic A* 01/22/2021 Negative   Negative Final   • Neisseria gonorrhoeae by Nucleic A* 01/22/2021 Negative  Negative Final   • Disclaimer 01/22/2021    Final                    Value:This result contains rich text formatting which cannot be displayed here.       Assessment/Plan:    (E06.3) Hashimoto's thyroiditis  (primary encounter diagnosis) patient is currently euthyroid, discussed thyroid ultrasound and thyroid function tests.  Advised patient to have repeat labs at 6 to 12 months, patient wishes to have them done at 12 months time interval given that she has maternal history of thyroid disorder.  If any changes in symptoms, changes to weight or skin or hair she is to contact her office.  Offered endocrinology referral, patient defers at current time.    Patient had questions regarding prenatal vitamins she is doing having another child advised that she if she is interested in getting pregnant that it would be optimal to have pregnancies prior to the age of 35 to avoid increased risk of genetic abnormalities.     Total time spent with patient 30 minutes, discussed all lab results as well as imaging studies answered all the questions to the best of my abilities.    Christopher Sagastume MD       yes
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
## 2021-06-13 NOTE — DISCHARGE NOTE PROVIDER - NS AS DC PROVIDER CONTACT Y/N MULTI
Telemedicine Visit: The patient's condition can be safely assessed and treated via synchronous audio and visual telemedicine encounter.      Reason for Telemedicine Visit: Services only offered telehealth    Originating Site (Patient Location): Patient's home    Distant Site (Provider Location): Provider Remote Setting- Home Office    Consent:  The patient/guardian has verbally consented to: the potential risks and benefits of telemedicine (video visit) versus in person care; bill my insurance or make self-payment for services provided; and responsibility for payment of non-covered services.     Mode of Communication:  Video Conference via Zoom    Call Started at: 9:30 AM  Call Ended at: 12:10 PM    As the provider I attest to compliance with applicable laws and regulations related to telemedicine.   Yes

## 2022-03-16 NOTE — ED ADULT TRIAGE NOTE - RESPIRATORY RATE (BREATHS/MIN)
Detail Level: Detailed Quality 130: Documentation Of Current Medications In The Medical Record: Current Medications Documented 24

## 2022-10-19 NOTE — SWALLOW BEDSIDE ASSESSMENT ADULT - SWALLOW EVAL: REHAB POTENTIAL
Contacted pt and confirmed she is going to lab today due to time of day and holiday weekend and states \"on my way.\"    fair, will monitor progress closely continue tamulosin 0.8mg at bedtime no retention  continue tamulosin 0.8mg at bedtime

## 2022-10-28 NOTE — H&P ADULT - NSHPPOADEEPVENOUSTHROMB_GEN_A_CORE
ENDOSCOPY DISCHARGE INSTRUCTIONS    You may experience some lightheadedness for the next several hours. Plan on quiet relaxation for the rest of today. A responsible adult needs to stay with you today. Because of the medications you received today-do not drive,operate machinery,or sign any contractual agreement for the next 24 hours. Do not drink any alcoholic beverages or take any unprescribed medications tonight. Eat bland food and avoid anything greasy or spicy initially-progress to your normal diet gradually. Diet restrictions as instructed. If you have any of the following problems, notify your physician or return to the hospital emergency room : fever, chills, excessive bleeding, excessive vomiting, difficulty swallowing, uncontrolled pain, increased abdominal distention, shortness of breath or any other problems. If you have a sore throat, you may use lozenges or salt water gargles. Please call Dr. Braden Oreilly office in 5 business days for biopsy results [phone]. ANESTHESIA DISCHARGE INSTRUCTIONS    Wear your seatbelt home. You are under the influence of drugs-do not drink alcohol,drive,operate machinery,or make any important decisions or sign any legal documentsfor 24 hours  Children should not ride [email or web address] or play on gym sets  for 24 hours after surgery. A responsible adult needs to be with you for 24 hours. You may experience lightheadedness,dizziness,or sleepiness following surgery. Rest at home today- increase activity as tolerated. Progress slowly to a regular diet unless your physician has instructed you otherwise. Drink plenty of water. If nausea becomes a problem call your physician. Coughing,sore throat,and muscle aches are other side effects of anesthesia,and should improve with time. Do not drive,operate machinery while taking narcotics. no

## 2022-11-11 NOTE — PHYSICAL EXAM
[Normal Outer Ear/Nose] : the outer ears and nose were normal in appearance [No JVD] : no jugular venous distention [No Respiratory Distress] : no respiratory distress  [Clear to Auscultation] : lungs were clear to auscultation bilaterally [Normal Rate] : normal rate  No [Regular Rhythm] : with a regular rhythm [No Edema] : there was no peripheral edema [de-identified] : thin [de-identified] : bitemporal wasting [de-identified] : WC bound paraplegic

## 2023-01-01 NOTE — ED PROVIDER NOTE - NS ED ATTENDING STATEMENT MOD
Acceptable eye movement/Lids with acceptable appearance and movement I have personally performed a face to face diagnostic evaluation on this patient. I have reviewed the ACP note and agree with the history, exam and plan of care, except as noted.

## 2023-03-29 NOTE — PHYSICAL EXAM
[Cachectic] : cachexia was observed [Chronically Ill] : chronically ill [Hoarse] : was hoarse [Normal Verbal Skills] : the patient had normal verbal communication skills [Normal Sclera/Conjunctiva] : normal sclera/conjunctiva [Normal Outer Ear/Nose] : the outer ears and nose were normal in appearance [No JVD] : no jugular venous distention [No Respiratory Distress] : no respiratory distress  [Normal Rate] : normal rate  [Regular Rhythm] : with a regular rhythm [No Edema] : there was no peripheral edema [Soft] : abdomen soft [Normal Supraclavicular Nodes] : no supraclavicular lymphadenopathy [Normal Axillary Nodes] : no axillary lymphadenopathy [No CVA Tenderness] : no CVA  tenderness [No Spinal Tenderness] : no spinal tenderness [No Joint Swelling] : no joint swelling [Grossly Normal Strength/Tone] : grossly normal strength/tone [No Rash] : no rash [Normal Affect] : the affect was normal [Normal Insight/Judgement] : insight and judgment were intact [de-identified] : KELIN bound [de-identified] : ostomy intact [de-identified] : chronic geronimo catheter [de-identified] : wounds are dressed yes

## 2025-06-17 NOTE — DISCHARGE NOTE NURSING/CASE MANAGEMENT/SOCIAL WORK - REASON FOR REFUSAL (REFER PATIENT TO HEALTHCARE PROVIDER FOR FOLLOW-UP):
Consent: The patient's consent was obtained including but not limited to risks of crusting, scabbing, blistering, scarring, darker or lighter pigmentary change, recurrence, incomplete removal and infection. Add 52 Modifier (Optional): no Treatment Number (Will Not Render If 0): 0 refused Anesthesia Volume In Cc: 0.5 Post-Care Instructions: I reviewed with the patient in detail post-care instructions. Patient is to wear sunprotection, and avoid picking at any of the treated lesions. Pt may apply Vaseline to crusted or scabbing areas. Detail Level: Detailed Medical Necessity Information: It is in your best interest to select a reason for this procedure from the list below. All of these items fulfill various CMS LCD requirements except the new and changing color options. Medical Necessity Clause: This procedure was medically necessary because the lesions that were treated were: